# Patient Record
Sex: FEMALE | Race: WHITE | NOT HISPANIC OR LATINO | Employment: OTHER | URBAN - METROPOLITAN AREA
[De-identification: names, ages, dates, MRNs, and addresses within clinical notes are randomized per-mention and may not be internally consistent; named-entity substitution may affect disease eponyms.]

---

## 2017-02-14 ENCOUNTER — HOSPITAL ENCOUNTER (INPATIENT)
Facility: HOSPITAL | Age: 82
LOS: 15 days | Discharge: HOME | DRG: 271 | End: 2017-03-01
Attending: SURGERY | Admitting: SURGERY
Payer: COMMERCIAL

## 2017-02-14 ENCOUNTER — HOSPITAL ENCOUNTER (EMERGENCY)
Facility: HOSPITAL | Age: 82
Discharge: ANOTHER HOSPITAL | End: 2017-02-14
Attending: EMERGENCY MEDICINE
Payer: COMMERCIAL

## 2017-02-14 ENCOUNTER — ANESTHESIA EVENT (OUTPATIENT)
Dept: SURGERY | Facility: HOSPITAL | Age: 82
DRG: 271 | End: 2017-02-14
Payer: COMMERCIAL

## 2017-02-14 ENCOUNTER — ANESTHESIA (OUTPATIENT)
Dept: SURGERY | Facility: HOSPITAL | Age: 82
DRG: 271 | End: 2017-02-14
Payer: COMMERCIAL

## 2017-02-14 ENCOUNTER — SURGERY (OUTPATIENT)
Age: 82
End: 2017-02-14

## 2017-02-14 VITALS
DIASTOLIC BLOOD PRESSURE: 83 MMHG | HEART RATE: 106 BPM | HEIGHT: 67 IN | RESPIRATION RATE: 18 BRPM | WEIGHT: 140 LBS | TEMPERATURE: 98 F | OXYGEN SATURATION: 95 % | SYSTOLIC BLOOD PRESSURE: 120 MMHG | BODY MASS INDEX: 21.97 KG/M2

## 2017-02-14 DIAGNOSIS — I48.0 PAROXYSMAL ATRIAL FIBRILLATION: ICD-10-CM

## 2017-02-14 DIAGNOSIS — I48.0 PAROXYSMAL ATRIAL FIBRILLATION: Primary | ICD-10-CM

## 2017-02-14 DIAGNOSIS — I42.9 CARDIOMYOPATHY: ICD-10-CM

## 2017-02-14 DIAGNOSIS — I70.229 CRITICAL LOWER LIMB ISCHEMIA: Primary | ICD-10-CM

## 2017-02-14 DIAGNOSIS — N39.0 LOWER URINARY TRACT INFECTIOUS DISEASE: ICD-10-CM

## 2017-02-14 DIAGNOSIS — M79.606 LEG PAIN: ICD-10-CM

## 2017-02-14 DIAGNOSIS — I99.8 LOWER LIMB ISCHEMIA: ICD-10-CM

## 2017-02-14 LAB
ABO + RH BLD: NORMAL
ALBUMIN SERPL BCP-MCNC: 2.8 G/DL
ALBUMIN SERPL BCP-MCNC: 3.2 G/DL
ALBUMIN SERPL BCP-MCNC: 3.3 G/DL
ALP SERPL-CCNC: 102 U/L
ALP SERPL-CCNC: 106 U/L
ALP SERPL-CCNC: 93 U/L
ALT SERPL W/O P-5'-P-CCNC: 13 U/L
ALT SERPL W/O P-5'-P-CCNC: 9 U/L
ALT SERPL W/O P-5'-P-CCNC: 9 U/L
ANION GAP SERPL CALC-SCNC: 13 MMOL/L
ANION GAP SERPL CALC-SCNC: 13 MMOL/L
ANION GAP SERPL CALC-SCNC: 5 MMOL/L
APTT BLDCRRT: 108.9 SEC
APTT BLDCRRT: 28.2 SEC
AST SERPL-CCNC: 16 U/L
AST SERPL-CCNC: 17 U/L
AST SERPL-CCNC: 32 U/L
BACTERIA #/AREA URNS HPF: ABNORMAL /HPF
BASOPHILS # BLD AUTO: 0 K/UL
BASOPHILS # BLD AUTO: 0.02 K/UL
BASOPHILS # BLD AUTO: 0.02 K/UL
BASOPHILS NFR BLD: 0.2 %
BASOPHILS NFR BLD: 0.2 %
BASOPHILS NFR BLD: 0.5 %
BILIRUB SERPL-MCNC: 0.5 MG/DL
BILIRUB SERPL-MCNC: 0.7 MG/DL
BILIRUB SERPL-MCNC: 0.9 MG/DL
BILIRUB UR QL STRIP: NEGATIVE
BLD GP AB SCN CELLS X3 SERPL QL: NORMAL
BUN SERPL-MCNC: 21 MG/DL
BUN SERPL-MCNC: 25 MG/DL
BUN SERPL-MCNC: 28 MG/DL
CALCIUM SERPL-MCNC: 8.7 MG/DL
CALCIUM SERPL-MCNC: 9.2 MG/DL
CALCIUM SERPL-MCNC: 9.4 MG/DL
CHLORIDE SERPL-SCNC: 102 MMOL/L
CHLORIDE SERPL-SCNC: 103 MMOL/L
CHLORIDE SERPL-SCNC: 103 MMOL/L
CLARITY UR: ABNORMAL
CO2 SERPL-SCNC: 23 MMOL/L
CO2 SERPL-SCNC: 23 MMOL/L
CO2 SERPL-SCNC: 28 MMOL/L
COLOR UR: YELLOW
CREAT SERPL-MCNC: 1.1 MG/DL
CREAT SERPL-MCNC: 1.2 MG/DL
CREAT SERPL-MCNC: 1.3 MG/DL
DIFFERENTIAL METHOD: ABNORMAL
EOSINOPHIL # BLD AUTO: 0.1 K/UL
EOSINOPHIL NFR BLD: 0.5 %
EOSINOPHIL NFR BLD: 1 %
EOSINOPHIL NFR BLD: 1 %
ERYTHROCYTE [DISTWIDTH] IN BLOOD BY AUTOMATED COUNT: 13.5 %
ERYTHROCYTE [DISTWIDTH] IN BLOOD BY AUTOMATED COUNT: 13.5 %
ERYTHROCYTE [DISTWIDTH] IN BLOOD BY AUTOMATED COUNT: 13.6 %
EST. GFR  (AFRICAN AMERICAN): 44 ML/MIN/1.73 M^2
EST. GFR  (AFRICAN AMERICAN): 48.6 ML/MIN/1.73 M^2
EST. GFR  (AFRICAN AMERICAN): 54 ML/MIN/1.73 M^2
EST. GFR  (NON AFRICAN AMERICAN): 38 ML/MIN/1.73 M^2
EST. GFR  (NON AFRICAN AMERICAN): 42.2 ML/MIN/1.73 M^2
EST. GFR  (NON AFRICAN AMERICAN): 46.9 ML/MIN/1.73 M^2
FACT X PPP CHRO-ACNC: 0.37 IU/ML
FACT X PPP CHRO-ACNC: 1.04 IU/ML
GLUCOSE SERPL-MCNC: 119 MG/DL
GLUCOSE SERPL-MCNC: 122 MG/DL
GLUCOSE SERPL-MCNC: 154 MG/DL
GLUCOSE UR QL STRIP: NEGATIVE
HCT VFR BLD AUTO: 33.4 %
HCT VFR BLD AUTO: 36 %
HCT VFR BLD AUTO: 36.7 %
HGB BLD-MCNC: 11 G/DL
HGB BLD-MCNC: 11.8 G/DL
HGB BLD-MCNC: 12.3 G/DL
HGB UR QL STRIP: NEGATIVE
INR PPP: 1
INR PPP: 1.1
KETONES UR QL STRIP: NEGATIVE
LACTATE SERPL-SCNC: 1 MMOL/L
LEUKOCYTE ESTERASE UR QL STRIP: ABNORMAL
LYMPHOCYTES # BLD AUTO: 1.1 K/UL
LYMPHOCYTES # BLD AUTO: 1.4 K/UL
LYMPHOCYTES # BLD AUTO: 1.5 K/UL
LYMPHOCYTES NFR BLD: 11.6 %
LYMPHOCYTES NFR BLD: 14.9 %
LYMPHOCYTES NFR BLD: 17 %
MAGNESIUM SERPL-MCNC: 1.7 MG/DL
MAGNESIUM SERPL-MCNC: 1.8 MG/DL
MCH RBC QN AUTO: 29 PG
MCH RBC QN AUTO: 29.6 PG
MCH RBC QN AUTO: 29.9 PG
MCHC RBC AUTO-ENTMCNC: 32.8 %
MCHC RBC AUTO-ENTMCNC: 32.9 %
MCHC RBC AUTO-ENTMCNC: 33.5 %
MCV RBC AUTO: 89 FL
MCV RBC AUTO: 89 FL
MCV RBC AUTO: 90 FL
MICROSCOPIC COMMENT: ABNORMAL
MONOCYTES # BLD AUTO: 0.6 K/UL
MONOCYTES # BLD AUTO: 0.7 K/UL
MONOCYTES # BLD AUTO: 0.7 K/UL
MONOCYTES NFR BLD: 6.9 %
MONOCYTES NFR BLD: 7 %
MONOCYTES NFR BLD: 7.6 %
NEUTROPHILS # BLD AUTO: 6.5 K/UL
NEUTROPHILS # BLD AUTO: 7.4 K/UL
NEUTROPHILS # BLD AUTO: 7.6 K/UL
NEUTROPHILS NFR BLD: 74.6 %
NEUTROPHILS NFR BLD: 76.6 %
NEUTROPHILS NFR BLD: 79.9 %
NITRITE UR QL STRIP: POSITIVE
PH UR STRIP: 7 [PH] (ref 5–8)
PHOSPHATE SERPL-MCNC: 3.6 MG/DL
PHOSPHATE SERPL-MCNC: 4.7 MG/DL
PLATELET # BLD AUTO: 206 K/UL
PLATELET # BLD AUTO: 234 K/UL
PLATELET # BLD AUTO: 239 K/UL
PMV BLD AUTO: 7.5 FL
PMV BLD AUTO: 9.4 FL
PMV BLD AUTO: 9.7 FL
POTASSIUM SERPL-SCNC: 3.8 MMOL/L
POTASSIUM SERPL-SCNC: 4.1 MMOL/L
POTASSIUM SERPL-SCNC: 4.4 MMOL/L
PROT SERPL-MCNC: 6.2 G/DL
PROT SERPL-MCNC: 6.9 G/DL
PROT SERPL-MCNC: 7.2 G/DL
PROT UR QL STRIP: NEGATIVE
PROTHROMBIN TIME: 10.6 SEC
PROTHROMBIN TIME: 11.2 SEC
RBC # BLD AUTO: 3.72 M/UL
RBC # BLD AUTO: 4.06 M/UL
RBC # BLD AUTO: 4.11 M/UL
RBC #/AREA URNS HPF: 1 /HPF (ref 0–4)
SODIUM SERPL-SCNC: 136 MMOL/L
SODIUM SERPL-SCNC: 138 MMOL/L
SODIUM SERPL-SCNC: 139 MMOL/L
SP GR UR STRIP: 1.01 (ref 1–1.03)
SQUAMOUS #/AREA URNS HPF: 2 /HPF
URN SPEC COLLECT METH UR: ABNORMAL
UROBILINOGEN UR STRIP-ACNC: NEGATIVE EU/DL
WBC # BLD AUTO: 8.73 K/UL
WBC # BLD AUTO: 9.6 K/UL
WBC # BLD AUTO: 9.65 K/UL
WBC #/AREA URNS HPF: 5 /HPF (ref 0–5)

## 2017-02-14 PROCEDURE — 25000003 PHARM REV CODE 250: Performed by: NURSE ANESTHETIST, CERTIFIED REGISTERED

## 2017-02-14 PROCEDURE — D9220A PRA ANESTHESIA: Mod: CRNA,,, | Performed by: NURSE ANESTHETIST, CERTIFIED REGISTERED

## 2017-02-14 PROCEDURE — 36000706: Performed by: SURGERY

## 2017-02-14 PROCEDURE — 85730 THROMBOPLASTIN TIME PARTIAL: CPT

## 2017-02-14 PROCEDURE — 86901 BLOOD TYPING SEROLOGIC RH(D): CPT

## 2017-02-14 PROCEDURE — 85025 COMPLETE CBC W/AUTO DIFF WBC: CPT

## 2017-02-14 PROCEDURE — 81000 URINALYSIS NONAUTO W/SCOPE: CPT

## 2017-02-14 PROCEDURE — 83605 ASSAY OF LACTIC ACID: CPT

## 2017-02-14 PROCEDURE — 25000003 PHARM REV CODE 250: Performed by: SURGERY

## 2017-02-14 PROCEDURE — 37000009 HC ANESTHESIA EA ADD 15 MINS: Performed by: SURGERY

## 2017-02-14 PROCEDURE — 04CS3ZZ EXTIRPATION OF MATTER FROM LEFT POSTERIOR TIBIAL ARTERY, PERCUTANEOUS APPROACH: ICD-10-PCS | Performed by: SURGERY

## 2017-02-14 PROCEDURE — 63600175 PHARM REV CODE 636 W HCPCS: Performed by: NURSE ANESTHETIST, CERTIFIED REGISTERED

## 2017-02-14 PROCEDURE — 27201423 OPTIME MED/SURG SUP & DEVICES STERILE SUPPLY: Performed by: SURGERY

## 2017-02-14 PROCEDURE — 71000039 HC RECOVERY, EACH ADD'L HOUR: Performed by: SURGERY

## 2017-02-14 PROCEDURE — 99999 HC NO LEVEL OF SERVICE - ED ONLY: CPT

## 2017-02-14 PROCEDURE — 85610 PROTHROMBIN TIME: CPT

## 2017-02-14 PROCEDURE — 86900 BLOOD TYPING SEROLOGIC ABO: CPT | Mod: 91

## 2017-02-14 PROCEDURE — 27602 DECOMPRESSION OF LOWER LEG: CPT | Mod: 51,,, | Performed by: SURGERY

## 2017-02-14 PROCEDURE — 04CN3ZZ EXTIRPATION OF MATTER FROM LEFT POPLITEAL ARTERY, PERCUTANEOUS APPROACH: ICD-10-PCS | Performed by: SURGERY

## 2017-02-14 PROCEDURE — 86850 RBC ANTIBODY SCREEN: CPT | Mod: 91

## 2017-02-14 PROCEDURE — 36415 COLL VENOUS BLD VENIPUNCTURE: CPT

## 2017-02-14 PROCEDURE — 85730 THROMBOPLASTIN TIME PARTIAL: CPT | Mod: 91

## 2017-02-14 PROCEDURE — 96365 THER/PROPH/DIAG IV INF INIT: CPT

## 2017-02-14 PROCEDURE — 27200677 HC TRANSDUCER MONITOR KIT SINGLE: Performed by: NURSE ANESTHETIST, CERTIFIED REGISTERED

## 2017-02-14 PROCEDURE — 12000002 HC ACUTE/MED SURGE SEMI-PRIVATE ROOM

## 2017-02-14 PROCEDURE — 047L3ZZ DILATION OF LEFT FEMORAL ARTERY, PERCUTANEOUS APPROACH: ICD-10-PCS | Performed by: SURGERY

## 2017-02-14 PROCEDURE — 0KNT0ZZ RELEASE LEFT LOWER LEG MUSCLE, OPEN APPROACH: ICD-10-PCS | Performed by: SURGERY

## 2017-02-14 PROCEDURE — 93010 ELECTROCARDIOGRAM REPORT: CPT | Mod: ,,, | Performed by: INTERNAL MEDICINE

## 2017-02-14 PROCEDURE — 85520 HEPARIN ASSAY: CPT

## 2017-02-14 PROCEDURE — C1729 CATH, DRAINAGE: HCPCS | Performed by: SURGERY

## 2017-02-14 PROCEDURE — 93005 ELECTROCARDIOGRAM TRACING: CPT

## 2017-02-14 PROCEDURE — 99285 EMERGENCY DEPT VISIT HI MDM: CPT | Mod: 25

## 2017-02-14 PROCEDURE — 80053 COMPREHEN METABOLIC PANEL: CPT | Mod: 91

## 2017-02-14 PROCEDURE — 80053 COMPREHEN METABOLIC PANEL: CPT

## 2017-02-14 PROCEDURE — 34201 REMOVAL OF ARTERY CLOT: CPT | Mod: ,,, | Performed by: SURGERY

## 2017-02-14 PROCEDURE — 63600175 PHARM REV CODE 636 W HCPCS: Performed by: SURGERY

## 2017-02-14 PROCEDURE — 86920 COMPATIBILITY TEST SPIN: CPT

## 2017-02-14 PROCEDURE — C1894 INTRO/SHEATH, NON-LASER: HCPCS | Performed by: SURGERY

## 2017-02-14 PROCEDURE — 83735 ASSAY OF MAGNESIUM: CPT | Mod: 91

## 2017-02-14 PROCEDURE — 85025 COMPLETE CBC W/AUTO DIFF WBC: CPT | Mod: 91

## 2017-02-14 PROCEDURE — 94640 AIRWAY INHALATION TREATMENT: CPT

## 2017-02-14 PROCEDURE — 27000221 HC OXYGEN, UP TO 24 HOURS

## 2017-02-14 PROCEDURE — 36000707: Performed by: SURGERY

## 2017-02-14 PROCEDURE — 84100 ASSAY OF PHOSPHORUS: CPT

## 2017-02-14 PROCEDURE — 27201037 HC PRESSURE MONITORING SET UP

## 2017-02-14 PROCEDURE — C1751 CATH, INF, PER/CENT/MIDLINE: HCPCS | Performed by: NURSE ANESTHETIST, CERTIFIED REGISTERED

## 2017-02-14 PROCEDURE — 25000003 PHARM REV CODE 250: Performed by: EMERGENCY MEDICINE

## 2017-02-14 PROCEDURE — D9220A PRA ANESTHESIA: Mod: ANES,,, | Performed by: ANESTHESIOLOGY

## 2017-02-14 PROCEDURE — 99223 1ST HOSP IP/OBS HIGH 75: CPT | Mod: 57,,, | Performed by: SURGERY

## 2017-02-14 PROCEDURE — 71000033 HC RECOVERY, INTIAL HOUR: Performed by: SURGERY

## 2017-02-14 PROCEDURE — 27100025 HC TUBING, SET FLUID WARMER: Performed by: NURSE ANESTHETIST, CERTIFIED REGISTERED

## 2017-02-14 PROCEDURE — 85610 PROTHROMBIN TIME: CPT | Mod: 91

## 2017-02-14 PROCEDURE — 86900 BLOOD TYPING SEROLOGIC ABO: CPT

## 2017-02-14 PROCEDURE — C1757 CATH, THROMBECTOMY/EMBOLECT: HCPCS | Performed by: SURGERY

## 2017-02-14 PROCEDURE — 36620 INSERTION CATHETER ARTERY: CPT | Mod: 59,,, | Performed by: ANESTHESIOLOGY

## 2017-02-14 PROCEDURE — 37000008 HC ANESTHESIA 1ST 15 MINUTES: Performed by: SURGERY

## 2017-02-14 PROCEDURE — 04CL3ZZ EXTIRPATION OF MATTER FROM LEFT FEMORAL ARTERY, PERCUTANEOUS APPROACH: ICD-10-PCS | Performed by: SURGERY

## 2017-02-14 PROCEDURE — C1769 GUIDE WIRE: HCPCS | Performed by: SURGERY

## 2017-02-14 PROCEDURE — 87086 URINE CULTURE/COLONY COUNT: CPT

## 2017-02-14 RX ORDER — LIDOCAINE HYDROCHLORIDE 10 MG/ML
1 INJECTION, SOLUTION EPIDURAL; INFILTRATION; INTRACAUDAL; PERINEURAL ONCE
Status: DISCONTINUED | OUTPATIENT
Start: 2017-02-14 | End: 2017-02-14

## 2017-02-14 RX ORDER — FENTANYL CITRATE 50 UG/ML
25 INJECTION, SOLUTION INTRAMUSCULAR; INTRAVENOUS EVERY 5 MIN PRN
Status: DISCONTINUED | OUTPATIENT
Start: 2017-02-14 | End: 2017-02-14

## 2017-02-14 RX ORDER — MORPHINE SULFATE 2 MG/ML
3 INJECTION, SOLUTION INTRAMUSCULAR; INTRAVENOUS EVERY 4 HOURS PRN
Status: DISCONTINUED | OUTPATIENT
Start: 2017-02-14 | End: 2017-02-14

## 2017-02-14 RX ORDER — METOPROLOL TARTRATE 1 MG/ML
5 INJECTION, SOLUTION INTRAVENOUS EVERY 6 HOURS
Status: DISPENSED | OUTPATIENT
Start: 2017-02-15 | End: 2017-02-15

## 2017-02-14 RX ORDER — HYDROMORPHONE HYDROCHLORIDE 1 MG/ML
0.2 INJECTION, SOLUTION INTRAMUSCULAR; INTRAVENOUS; SUBCUTANEOUS EVERY 6 HOURS PRN
Status: DISCONTINUED | OUTPATIENT
Start: 2017-02-14 | End: 2017-03-01 | Stop reason: HOSPADM

## 2017-02-14 RX ORDER — ROCURONIUM BROMIDE 10 MG/ML
INJECTION, SOLUTION INTRAVENOUS
Status: DISCONTINUED | OUTPATIENT
Start: 2017-02-14 | End: 2017-02-14

## 2017-02-14 RX ORDER — ONDANSETRON 2 MG/ML
4 INJECTION INTRAMUSCULAR; INTRAVENOUS DAILY PRN
Status: DISCONTINUED | OUTPATIENT
Start: 2017-02-14 | End: 2017-02-14

## 2017-02-14 RX ORDER — ONDANSETRON 8 MG/1
8 TABLET, ORALLY DISINTEGRATING ORAL EVERY 8 HOURS PRN
Status: DISCONTINUED | OUTPATIENT
Start: 2017-02-14 | End: 2017-03-01 | Stop reason: HOSPADM

## 2017-02-14 RX ORDER — FLUCONAZOLE 2 MG/ML
400 INJECTION, SOLUTION INTRAVENOUS ONCE
Status: COMPLETED | OUTPATIENT
Start: 2017-02-14 | End: 2017-02-15

## 2017-02-14 RX ORDER — SODIUM CHLORIDE 0.9 % (FLUSH) 0.9 %
3 SYRINGE (ML) INJECTION
Status: DISCONTINUED | OUTPATIENT
Start: 2017-02-14 | End: 2017-02-14

## 2017-02-14 RX ORDER — ATORVASTATIN CALCIUM 10 MG/1
10 TABLET, FILM COATED ORAL DAILY
Status: DISCONTINUED | OUTPATIENT
Start: 2017-02-15 | End: 2017-03-01 | Stop reason: HOSPADM

## 2017-02-14 RX ORDER — ONDANSETRON 2 MG/ML
INJECTION INTRAMUSCULAR; INTRAVENOUS
Status: DISCONTINUED | OUTPATIENT
Start: 2017-02-14 | End: 2017-02-14

## 2017-02-14 RX ORDER — PANTOPRAZOLE SODIUM 40 MG/1
40 TABLET, DELAYED RELEASE ORAL DAILY
Status: DISCONTINUED | OUTPATIENT
Start: 2017-02-15 | End: 2017-03-01 | Stop reason: HOSPADM

## 2017-02-14 RX ORDER — MEMANTINE HYDROCHLORIDE 5 MG/1
10 TABLET ORAL 2 TIMES DAILY
Status: DISCONTINUED | OUTPATIENT
Start: 2017-02-15 | End: 2017-03-01 | Stop reason: HOSPADM

## 2017-02-14 RX ORDER — IPRATROPIUM BROMIDE 0.5 MG/2.5ML
0.5 SOLUTION RESPIRATORY (INHALATION) EVERY 8 HOURS
Status: DISCONTINUED | OUTPATIENT
Start: 2017-02-15 | End: 2017-02-25

## 2017-02-14 RX ORDER — ARIPIPRAZOLE 5 MG/1
5 TABLET ORAL DAILY
Status: DISCONTINUED | OUTPATIENT
Start: 2017-02-15 | End: 2017-02-16

## 2017-02-14 RX ORDER — HYDROMORPHONE HYDROCHLORIDE 2 MG/ML
INJECTION, SOLUTION INTRAMUSCULAR; INTRAVENOUS; SUBCUTANEOUS
Status: DISCONTINUED | OUTPATIENT
Start: 2017-02-14 | End: 2017-02-14

## 2017-02-14 RX ORDER — PROPOFOL 10 MG/ML
VIAL (ML) INTRAVENOUS
Status: DISCONTINUED | OUTPATIENT
Start: 2017-02-14 | End: 2017-02-14

## 2017-02-14 RX ORDER — HEPARIN SODIUM 10000 [USP'U]/100ML
INJECTION, SOLUTION INTRAVENOUS CONTINUOUS PRN
Status: DISCONTINUED | OUTPATIENT
Start: 2017-02-14 | End: 2017-02-14

## 2017-02-14 RX ORDER — SODIUM CHLORIDE 9 MG/ML
INJECTION, SOLUTION INTRAVENOUS CONTINUOUS
Status: DISCONTINUED | OUTPATIENT
Start: 2017-02-14 | End: 2017-02-14

## 2017-02-14 RX ORDER — PHENYLEPHRINE HYDROCHLORIDE 10 MG/ML
INJECTION INTRAVENOUS
Status: DISCONTINUED | OUTPATIENT
Start: 2017-02-14 | End: 2017-02-14

## 2017-02-14 RX ORDER — SODIUM CHLORIDE 0.9 % (FLUSH) 0.9 %
3 SYRINGE (ML) INJECTION EVERY 8 HOURS
Status: DISCONTINUED | OUTPATIENT
Start: 2017-02-14 | End: 2017-02-14

## 2017-02-14 RX ORDER — HEPARIN SODIUM,PORCINE/D5W 25000/250
12.5 INTRAVENOUS SOLUTION INTRAVENOUS CONTINUOUS
Status: DISPENSED | OUTPATIENT
Start: 2017-02-14 | End: 2017-02-15

## 2017-02-14 RX ORDER — FENTANYL CITRATE 50 UG/ML
INJECTION, SOLUTION INTRAMUSCULAR; INTRAVENOUS
Status: DISCONTINUED | OUTPATIENT
Start: 2017-02-14 | End: 2017-02-14

## 2017-02-14 RX ORDER — LIDOCAINE HCL/PF 100 MG/5ML
SYRINGE (ML) INTRAVENOUS
Status: DISCONTINUED | OUTPATIENT
Start: 2017-02-14 | End: 2017-02-14

## 2017-02-14 RX ORDER — SODIUM CHLORIDE 9 MG/ML
INJECTION, SOLUTION INTRAVENOUS CONTINUOUS
Status: DISCONTINUED | OUTPATIENT
Start: 2017-02-14 | End: 2017-02-15

## 2017-02-14 RX ORDER — MORPHINE SULFATE 2 MG/ML
2 INJECTION, SOLUTION INTRAMUSCULAR; INTRAVENOUS EVERY 4 HOURS PRN
Status: DISCONTINUED | OUTPATIENT
Start: 2017-02-14 | End: 2017-02-14

## 2017-02-14 RX ORDER — HEPARIN SODIUM 10000 [USP'U]/100ML
INJECTION, SOLUTION INTRAVENOUS
Status: DISCONTINUED | OUTPATIENT
Start: 2017-02-14 | End: 2017-02-14

## 2017-02-14 RX ORDER — SUCCINYLCHOLINE CHLORIDE 20 MG/ML
INJECTION INTRAMUSCULAR; INTRAVENOUS
Status: DISCONTINUED | OUTPATIENT
Start: 2017-02-14 | End: 2017-02-14

## 2017-02-14 RX ORDER — CEFAZOLIN SODIUM 1 G/3ML
INJECTION, POWDER, FOR SOLUTION INTRAMUSCULAR; INTRAVENOUS
Status: DISCONTINUED | OUTPATIENT
Start: 2017-02-14 | End: 2017-02-14

## 2017-02-14 RX ORDER — ESCITALOPRAM OXALATE 10 MG/1
10 TABLET ORAL DAILY
Status: DISCONTINUED | OUTPATIENT
Start: 2017-02-15 | End: 2017-03-01 | Stop reason: HOSPADM

## 2017-02-14 RX ORDER — HYDROCODONE BITARTRATE AND ACETAMINOPHEN 5; 325 MG/1; MG/1
1 TABLET ORAL EVERY 4 HOURS PRN
Status: DISCONTINUED | OUTPATIENT
Start: 2017-02-14 | End: 2017-03-01 | Stop reason: HOSPADM

## 2017-02-14 RX ORDER — GLYCOPYRROLATE 0.2 MG/ML
INJECTION INTRAMUSCULAR; INTRAVENOUS
Status: DISCONTINUED | OUTPATIENT
Start: 2017-02-14 | End: 2017-02-14

## 2017-02-14 RX ORDER — LIDOCAINE HYDROCHLORIDE 10 MG/ML
1 INJECTION, SOLUTION EPIDURAL; INFILTRATION; INTRACAUDAL; PERINEURAL ONCE
Status: CANCELLED | OUTPATIENT
Start: 2017-02-14 | End: 2017-02-14

## 2017-02-14 RX ORDER — NEOSTIGMINE METHYLSULFATE 1 MG/ML
INJECTION, SOLUTION INTRAVENOUS
Status: DISCONTINUED | OUTPATIENT
Start: 2017-02-14 | End: 2017-02-14

## 2017-02-14 RX ORDER — HEPARIN SODIUM 1000 [USP'U]/ML
INJECTION, SOLUTION INTRAVENOUS; SUBCUTANEOUS
Status: DISCONTINUED | OUTPATIENT
Start: 2017-02-14 | End: 2017-02-14

## 2017-02-14 RX ORDER — HEPARIN SODIUM,PORCINE/D5W 25000/250
17 INTRAVENOUS SOLUTION INTRAVENOUS CONTINUOUS
Status: DISCONTINUED | OUTPATIENT
Start: 2017-02-14 | End: 2017-02-14 | Stop reason: HOSPADM

## 2017-02-14 RX ORDER — MIRTAZAPINE 15 MG/1
30 TABLET, FILM COATED ORAL NIGHTLY
Status: DISCONTINUED | OUTPATIENT
Start: 2017-02-14 | End: 2017-03-01 | Stop reason: HOSPADM

## 2017-02-14 RX ORDER — METOPROLOL TARTRATE 100 MG/1
100 TABLET ORAL 2 TIMES DAILY
Status: DISCONTINUED | OUTPATIENT
Start: 2017-02-15 | End: 2017-02-15

## 2017-02-14 RX ADMIN — Medication 50 MG: at 10:02

## 2017-02-14 RX ADMIN — HYDROMORPHONE HYDROCHLORIDE 0.6 MG: 2 INJECTION INTRAMUSCULAR; INTRAVENOUS; SUBCUTANEOUS at 10:02

## 2017-02-14 RX ADMIN — PHENYLEPHRINE HYDROCHLORIDE 100 MCG: 10 INJECTION INTRAVENOUS at 08:02

## 2017-02-14 RX ADMIN — PHENYLEPHRINE HYDROCHLORIDE 200 MCG: 10 INJECTION INTRAVENOUS at 09:02

## 2017-02-14 RX ADMIN — SODIUM CHLORIDE, SODIUM GLUCONATE, SODIUM ACETATE, POTASSIUM CHLORIDE, MAGNESIUM CHLORIDE, SODIUM PHOSPHATE, DIBASIC, AND POTASSIUM PHOSPHATE: .53; .5; .37; .037; .03; .012; .00082 INJECTION, SOLUTION INTRAVENOUS at 08:02

## 2017-02-14 RX ADMIN — HEPARIN SODIUM AND DEXTROSE 700 UNITS: 10000; 5 INJECTION INTRAVENOUS at 09:02

## 2017-02-14 RX ADMIN — SODIUM CHLORIDE, SODIUM GLUCONATE, SODIUM ACETATE, POTASSIUM CHLORIDE, MAGNESIUM CHLORIDE, SODIUM PHOSPHATE, DIBASIC, AND POTASSIUM PHOSPHATE: .53; .5; .37; .037; .03; .012; .00082 INJECTION, SOLUTION INTRAVENOUS at 07:02

## 2017-02-14 RX ADMIN — FENTANYL CITRATE 50 MCG: 50 INJECTION, SOLUTION INTRAMUSCULAR; INTRAVENOUS at 07:02

## 2017-02-14 RX ADMIN — NEOSTIGMINE METHYLSULFATE 3 MG: 1 INJECTION INTRAVENOUS at 10:02

## 2017-02-14 RX ADMIN — SUCCINYLCHOLINE CHLORIDE 100 MG: 20 INJECTION, SOLUTION INTRAMUSCULAR; INTRAVENOUS at 07:02

## 2017-02-14 RX ADMIN — PROPOFOL 50 MG: 10 INJECTION, EMULSION INTRAVENOUS at 08:02

## 2017-02-14 RX ADMIN — SODIUM CHLORIDE: 0.9 INJECTION, SOLUTION INTRAVENOUS at 10:02

## 2017-02-14 RX ADMIN — ROCURONIUM BROMIDE 25 MG: 10 INJECTION, SOLUTION INTRAVENOUS at 07:02

## 2017-02-14 RX ADMIN — HEPARIN SODIUM 6000 UNITS: 1000 INJECTION, SOLUTION INTRAVENOUS; SUBCUTANEOUS at 07:02

## 2017-02-14 RX ADMIN — PROPOFOL 30 MG: 10 INJECTION, EMULSION INTRAVENOUS at 09:02

## 2017-02-14 RX ADMIN — HYDROMORPHONE HYDROCHLORIDE 0.2 MG: 2 INJECTION INTRAMUSCULAR; INTRAVENOUS; SUBCUTANEOUS at 10:02

## 2017-02-14 RX ADMIN — PHENYLEPHRINE HYDROCHLORIDE 200 MCG: 10 INJECTION INTRAVENOUS at 07:02

## 2017-02-14 RX ADMIN — PROPOFOL 30 MG: 10 INJECTION, EMULSION INTRAVENOUS at 08:02

## 2017-02-14 RX ADMIN — FENTANYL CITRATE 25 MCG: 50 INJECTION, SOLUTION INTRAMUSCULAR; INTRAVENOUS at 08:02

## 2017-02-14 RX ADMIN — LIDOCAINE HYDROCHLORIDE 80 SYRINGE: 20 INJECTION, SOLUTION INTRAVENOUS at 07:02

## 2017-02-14 RX ADMIN — HEPARIN SODIUM AND DEXTROSE 17 UNITS/KG/HR: 10000; 5 INJECTION INTRAVENOUS at 07:02

## 2017-02-14 RX ADMIN — HEPARIN SODIUM 10000 UNITS: 1000 INJECTION, SOLUTION INTRAVENOUS; SUBCUTANEOUS at 08:02

## 2017-02-14 RX ADMIN — ROCURONIUM BROMIDE 5 MG: 10 INJECTION, SOLUTION INTRAVENOUS at 07:02

## 2017-02-14 RX ADMIN — FLUCONAZOLE 400 MG: 2 INJECTION INTRAVENOUS at 10:02

## 2017-02-14 RX ADMIN — PHENYLEPHRINE HYDROCHLORIDE 100 MCG: 10 INJECTION INTRAVENOUS at 07:02

## 2017-02-14 RX ADMIN — EPHEDRINE SULFATE 5 MG: 50 INJECTION, SOLUTION INTRAMUSCULAR; INTRAVENOUS; SUBCUTANEOUS at 08:02

## 2017-02-14 RX ADMIN — CALCIUM CHLORIDE 500 MG: 100 INJECTION, SOLUTION INTRAVENOUS at 08:02

## 2017-02-14 RX ADMIN — HEPARIN SODIUM 2000 UNITS: 1000 INJECTION, SOLUTION INTRAVENOUS; SUBCUTANEOUS at 08:02

## 2017-02-14 RX ADMIN — VANCOMYCIN HYDROCHLORIDE 1 G: 1 INJECTION, POWDER, LYOPHILIZED, FOR SOLUTION INTRAVENOUS at 07:02

## 2017-02-14 RX ADMIN — PROPOFOL 120 MG: 10 INJECTION, EMULSION INTRAVENOUS at 07:02

## 2017-02-14 RX ADMIN — CEFAZOLIN 2 G: 1 INJECTION, POWDER, FOR SOLUTION INTRAVENOUS at 07:02

## 2017-02-14 RX ADMIN — GLYCOPYRROLATE 0.4 MG: 0.2 INJECTION, SOLUTION INTRAMUSCULAR; INTRAVENOUS at 10:02

## 2017-02-14 RX ADMIN — HEPARIN SODIUM AND DEXTROSE 12.5 UNITS/KG/HR: 10000; 5 INJECTION INTRAVENOUS at 10:02

## 2017-02-14 RX ADMIN — FENTANYL CITRATE 25 MCG: 50 INJECTION, SOLUTION INTRAMUSCULAR; INTRAVENOUS at 09:02

## 2017-02-14 RX ADMIN — ROCURONIUM BROMIDE 20 MG: 10 INJECTION, SOLUTION INTRAVENOUS at 08:02

## 2017-02-14 RX ADMIN — METOPROLOL TARTRATE 5 MG: 5 INJECTION INTRAVENOUS at 11:02

## 2017-02-14 RX ADMIN — IPRATROPIUM BROMIDE 0.5 MG: 0.5 SOLUTION RESPIRATORY (INHALATION) at 11:02

## 2017-02-14 NOTE — ED NOTES
US is still at bedside. Patient has been updated on plan of care and lab results. No needs or questions at this time.

## 2017-02-14 NOTE — ED NOTES
Upon transfer to [facility] [place], patient is AAO to self, place and situation. Patient is unsure what year it is. Report has been given to paramedic. No cardiac or respiratory complications.

## 2017-02-14 NOTE — ED NOTES
Presents to the ER with c/o bilateral lower extremity pain that is a chronic problem. Caretakers reports that patient started to cry out that her legs hurt between her ankles and knee with increased pain and numbness with tingling to left leg. Skin is significantly cooler in left leg than right. + pulses to right side, of body, + left pulse to left femoral and left popliteal, the left pedal pulse is absent. Attempted doppler to left foot without success. Decreased sensation to left lower extremity. Associated complaints are and episode of SOB that occurred with pain. EMS reports that patient was 85% on room air when they arrived to patient. Patient is 95% room air, upon arrival to ED. Patient is a poor historian secondary to dementia. Mucous membranes are pink and moist. Skin is warm, dry and intact. Lungs decreased breath sounds  bilaterally, respirations are regular and unlabored. Denies cough, congestion, or rhinorrhea. BS active x4, no tenderness with palpation, abd is soft and not distended. Denies any appetite or activity change. S1S2, capillary refill is < 2 seconds. Denies dysuria, difficulty urinating, frequency, numbness, tingling or weakness. NBA SHELLEY

## 2017-02-14 NOTE — ED PROVIDER NOTES
"Encounter Date: 2/14/2017    SCRIBE #1 NOTE: I, Yazmin Nunes, am scribing for, and in the presence of,  Dr. Turner. I have scribed the entire note.       History     Chief Complaint   Patient presents with    Leg Pain     bilatearl leg pain      Review of patient's allergies indicates:  No Known Allergies  HPI Comments: 02/14/2017  1:07 PM     Chief Complaint: pain in her legs       The patient is an 82 y.o. female who is coming in from [facility] assisted living is presenting with leg pain. Pt reports she has had leg pain for "quite a while". It is located between her ankles and knees bilaterally. After speaking to Elizabeth at [facility], her nurse states she was screaming in pain complaining that her left leg was hurting and going numb. Pain does not radiate to her calves.   She denies fevers chills, diarrhea or vomiting. There are no other complaints at this time. She has a past medical history of Anxiety; Arthritis; Dementia; Former smoker; Hyperlipidemia; Hypertension; and Stroke.  has a past surgical history that includes Hysterectomy; Anterior vaginal repair (1960's); Colonoscopy (2008); Upper gastrointestinal endoscopy (2008); Joint replacement; and Colonoscopy (N/A, 1/5/2016).      The history is provided by the patient, the EMS personnel and the nursing home.     Past Medical History   Diagnosis Date    Anxiety     Arthritis     Dementia     Former smoker     Hyperlipidemia     Hypertension     Stroke      at age 58     Past Medical History Pertinent Negatives   Diagnosis Date Noted    Cancer 12/30/2015    Colon polyp 12/8/2015    Encounter for blood transfusion 12/30/2015    GERD (gastroesophageal reflux disease) 12/8/2015     Past Surgical History   Procedure Laterality Date    Hysterectomy       total    Anterior vaginal repair  1960's    Colonoscopy  2008    Upper gastrointestinal endoscopy  2008      h. pylori    Joint replacement       bilateral hip    Colonoscopy N/A 1/5/2016     " Procedure: COLONOSCOPY;  Surgeon: Daniel Alicea MD;  Location: [facility];  Service: Endoscopy;  Laterality: N/A;     Family History   Problem Relation Age of Onset    Colon cancer Mother      in late 60s    Colon cancer Father      Social History   Substance Use Topics    Smoking status: Former Smoker     Packs/day: 1.00     Years: 25.00     Types: Cigarettes     Quit date: 10/7/1998    Smokeless tobacco: Not on file    Alcohol use No      Comment: occ     Review of Systems   Constitutional: Negative for fever.   Respiratory: Positive for shortness of breath (earlier today, gone now).    Cardiovascular: Negative for chest pain.   Gastrointestinal: Negative for nausea.   Musculoskeletal: Positive for myalgias (biltaeral leg pain). Negative for back pain.   Skin: Negative for rash.   Hematological: Does not bruise/bleed easily.   All other systems reviewed and are negative.      Physical Exam   Initial Vitals   BP Pulse Resp Temp SpO2   02/14/17 1304 02/14/17 1304 02/14/17 1304 02/14/17 1304 02/14/17 1304   123/69 113 18 97.5 °F (36.4 °C) 95 %     Physical Exam    Nursing note and vitals reviewed.  Constitutional: She appears well-developed and well-nourished. She is not diaphoretic. No distress.   HENT:   Head: Normocephalic and atraumatic.   Eyes: Conjunctivae are normal.   Cardiovascular: Regular rhythm and normal heart sounds. Exam reveals no gallop and no friction rub.    No murmur heard.  No DP pulse on left. 2+ DP on the right  Tachycardic    Bilateral Doppler signal in the popliteal arteries.  Bilateral palpable femoral pulses.    Left lower extremity cool to touch.  No lower extremity emboli   Pulmonary/Chest: No respiratory distress. She has no wheezes. She has no rhonchi. She has no rales.   Decreased breath sounds bilaterally.    Abdominal: Soft. Bowel sounds are normal. She exhibits no distension and no mass. There is no tenderness. There is no rebound and no guarding.   Musculoskeletal:    bilateral chronic venous stasis    Neurological: She is alert.   Oriented to place, not year    Skin: Skin is warm.   Psychiatric: She has a normal mood and affect.         ED Course   Critical Care  Date/Time: 2/14/2017 5:26 PM  Performed by: BROOKS KENT  Authorized by: BROOKS KENT   Direct patient critical care time: 25 minutes  Additional history critical care time: 10 minutes  Ordering / reviewing critical care time: 15 minutes  Documentation critical care time: 15 minutes  Consulting other physicians critical care time: 10 minutes  Consult with family critical care time: 13 minutes  Total critical care time (exclusive of procedural time) : 88 minutes  Comments: Critical care for left lower extremity ischemia requiring a heparin drip and transferred to a facility with vascular surgery        Labs Reviewed - No data to display  EKG Readings: (Independently Interpreted)   Initial Reading: No STEMI. Rhythm: Normal Sinus Rhythm. Heart Rate: 99. Ectopy: No Ectopy PVCs. Conduction: Normal. ST Segments: Normal ST Segments. T Waves: Normal. Clinical Impression: Normal Sinus Rhythm          Medical Decision Making:   History:   I obtained history from: someone other than patient.  Clinical Tests:   Lab Tests: Ordered and Reviewed  Radiological Study: Ordered and Reviewed  Medical Tests: Ordered and Reviewed            Scribe Attestation:   Scribe #1: I performed the above scribed service and the documentation accurately describes the services I performed. I attest to the accuracy of the note.    Attending Attestation:           Physician Attestation for Scribe:  Physician Attestation Statement for Scribe #1: I, Dr. Kent, reviewed documentation, as scribed by Isamar Nunes in my presence, and it is both accurate and complete.                 ED Course   Comment By Time   Case discussed with Elizabeth at [facility] states the patient has been complaining of leg pain today and was screaming in pain  earlier in complaining of numbness to the left leg.  Also complained of some shortness of breath on EMS arrival. Dominguez Turner MD 02/14 1313   Bilateral palpable femoral pulses.  Bilateral popliteal Doppler signal. Dominguez Turner MD 02/14 1319   Case discussed Dr. Ferrera of radiology, arterial occlusion of the left lower extremity.  [facility] called to consult vascular surgery. Dominguez Turner MD 02/14 1540   Awaiting a call for [facility] vascular surgery regarding the patient's condition.  A heparin drip has been ordered at this time. Dominguez Turner MD 02/14 1551   Case d/w dr zaman vascular surgery, who agrees that the heparin drip.  Patient will be transferred to [facility] for vascular surgery consultation Dominguez Turner MD 02/14 1607   EMS here to transport patient Dominguez Turner MD 02/14 1635     Clinical Impression:   The encounter diagnosis was Leg pain.      82-year-old female with dementia presents to the ER for leg pain.  Left lower extremity no palpable dorsalis pedal pulse on arrival.  Bounding right dorsalis pedal pulse.  Left lower extremity ultrasound demonstrates occluded femoral artery and occluded popliteal.  Case discussed with vascular surgery.  Heparin drip was started prior to consultation with vascular surgery.  Transferred emergently to [facility] for left lower extremity ischemia which is likely acute.     Dominguez Turner MD  02/14/17 7028

## 2017-02-14 NOTE — ED NOTES
is at the bedside, he has been updated on plan of care and lab results. No needs or questions at this time.

## 2017-02-14 NOTE — IP AVS SNAPSHOT
[facility]  [address]  [facility] [address]  Phone: [phone]           Patient Discharge Instructions     Our goal is to set you up for success. This packet includes information on your condition, medications, and your home care. It will help you to care for yourself so you don't get sicker and need to go back to the hospital.     Please ask your nurse if you have any questions.        There are many details to remember when preparing to leave the hospital. Here is what you will need to do:    1. Take your medicine. If you are prescribed medications, review your Medication List in the following pages. You may have new medications to  at the pharmacy and others that you'll need to stop taking. Review the instructions for how and when to take your medications. Talk with your doctor or nurses if you are unsure of what to do.     2. Go to your follow-up appointments. Specific follow-up information is listed in the following pages. Your may be contacted by a transition nurse or clinical provider about future appointments. Be sure we have all of the phone numbers to reach you, if needed. Please contact your provider's office if you are unable to make an appointment.     3. Watch for warning signs. Your doctor or nurse will give you detailed warning signs to watch for and when to call for assistance. These instructions may also include educational information about your condition. If you experience any of warning signs to your health, call your doctor.               [facility]  Unless otherwise directed by your provider, please contact [facility], our nurse care line that is available for 24/7 assistance.     [phone] (toll-free)    Registered nurses in the [facility] provide clinical advisement, health education, appointment booking, and other advisory services.                    ** Verify the list of medication(s) below is accurate and up  to date. Carry this with you in case of emergency. If your medications have changed, please notify your healthcare provider.             Medication List      START taking these medications        Additional Info                      apixaban 2.5 mg Tab   Quantity:  60 tablet   Refills:  11   Dose:  2.5 mg    Last time this was given:  2.5 mg on 3/1/2017  9:22 AM   Instructions:  Take 1 tablet (2.5 mg total) by mouth 2 (two) times daily.     Begin Date    AM    Noon    PM    Bedtime       aspirin 81 MG EC tablet   Commonly known as:  ECOTRIN   Refills:  0   Dose:  81 mg    Last time this was given:  81 mg on 3/1/2017  9:22 AM   Instructions:  Take 1 tablet (81 mg total) by mouth once daily.     Begin Date    AM    Noon    PM    Bedtime       hydrocodone-acetaminophen 5-325mg 5-325 mg per tablet   Commonly known as:  NORCO   Quantity:  41 tablet   Refills:  0   Dose:  1 tablet    Last time this was given:  1 tablet on 2/27/2017  8:54 AM   Instructions:  Take 1 tablet by mouth every 4 (four) hours as needed.     Begin Date    AM    Noon    PM    Bedtime       lisinopril 2.5 MG tablet   Commonly known as:  PRINIVIL,ZESTRIL   Quantity:  90 tablet   Refills:  3   Dose:  2.5 mg    Last time this was given:  2.5 mg on 3/1/2017  9:22 AM   Instructions:  Take 1 tablet (2.5 mg total) by mouth once daily.     Begin Date    AM    Noon    PM    Bedtime       senna-docusate 8.6-50 mg 8.6-50 mg per tablet   Commonly known as:  PERICOLACE   Refills:  0   Dose:  1 tablet    Last time this was given:  1 tablet on 3/1/2017  9:22 AM   Instructions:  Take 1 tablet by mouth 2 (two) times daily.     Begin Date    AM    Noon    PM    Bedtime         CONTINUE taking these medications        Additional Info                      ABILIFY 10 MG Tab   Refills:  0   Dose:  5 mg   Generic drug:  aripiprazole    Last time this was given:  5 mg on 2/22/2017  9:24 AM   Instructions:  Take 5 mg by mouth once daily.     Begin Date    AM    Noon    PM     Bedtime       atorvastatin 10 MG tablet   Commonly known as:  LIPITOR   Refills:  0   Dose:  10 mg    Last time this was given:  10 mg on 3/1/2017  9:23 AM   Instructions:  Take 10 mg by mouth once daily.     Begin Date    AM    Noon    PM    Bedtime       donepezil 10 MG tablet   Commonly known as:  ARICEPT   Quantity:  30 tablet   Refills:  11   Dose:  10 mg    Instructions:  Take 1 tablet (10 mg total) by mouth once daily.     Begin Date    AM    Noon    PM    Bedtime       escitalopram oxalate 10 MG tablet   Commonly known as:  LEXAPRO   Refills:  0   Dose:  10 mg    Last time this was given:  10 mg on 3/1/2017  9:22 AM   Instructions:  Take 10 mg by mouth once daily.     Begin Date    AM    Noon    PM    Bedtime       FIORICET -40 mg per tablet   Refills:  0   Dose:  1 tablet   Generic drug:  butalbital-acetaminophen-caffeine -40 mg    Instructions:  Take 1 tablet by mouth every 4 (four) hours as needed for Pain.     Begin Date    AM    Noon    PM    Bedtime       ginkgo biloba 60 mg Cap   Refills:  0   Dose:  1 tablet    Instructions:  Take 1 tablet by mouth once daily.     Begin Date    AM    Noon    PM    Bedtime       metoprolol succinate 100 MG 24 hr tablet   Commonly known as:  TOPROL-XL   Refills:  0   Dose:  100 mg    Instructions:  Take 100 mg by mouth once daily.     Begin Date    AM    Noon    PM    Bedtime       mirabegron 25 mg Tb24 ER tablet   Commonly known as:  MYRBETRIQ   Quantity:  30 tablet   Refills:  11   Dose:  25 mg    Instructions:  Take 1 tablet (25 mg total) by mouth once daily.     Begin Date    AM    Noon    PM    Bedtime       NAMENDA 10 MG Tab   Refills:  0   Dose:  10 mg   Generic drug:  memantine    Last time this was given:  10 mg on 3/1/2017  9:22 AM   Instructions:  10 mg 2 (two) times daily.     Begin Date    AM    Noon    PM    Bedtime       pantoprazole 40 MG tablet   Commonly known as:  PROTONIX   Quantity:  30 tablet   Refills:  6   Dose:  40 mg    Last time  this was given:  40 mg on 3/1/2017  9:22 AM   Instructions:  Take 1 tablet (40 mg total) by mouth once daily.     Begin Date    AM    Noon    PM    Bedtime       REMERON 30 MG tablet   Refills:  0   Dose:  30 mg   Generic drug:  mirtazapine    Last time this was given:  30 mg on 2/28/2017  9:04 PM   Instructions:  Take 30 mg by mouth every evening.     Begin Date    AM    Noon    PM    Bedtime       triamterene-hydrochlorothiazide 37.5-25 mg 37.5-25 mg per capsule   Commonly known as:  DYAZIDE   Refills:  0   Dose:  1 capsule    Instructions:  Take 1 capsule by mouth every morning.     Begin Date    AM    Noon    PM    Bedtime            Where to Get Your Medications      You can get these medications from any pharmacy     Bring a paper prescription for each of these medications     apixaban 2.5 mg Tab    donepezil 10 MG tablet    hydrocodone-acetaminophen 5-325mg 5-325 mg per tablet    lisinopril 2.5 MG tablet       You don't need a prescription for these medications     aspirin 81 MG EC tablet    senna-docusate 8.6-50 mg 8.6-50 mg per tablet                  Please bring to all follow up appointments:    1. A copy of your discharge instructions.  2. All medicines you are currently taking in their original bottles.  3. Identification and insurance card.    Please arrive 15 minutes ahead of scheduled appointment time.    Please call 24 hours in advance if you must reschedule your appointment and/or time.        Follow-up Information     Follow up with Rigo Bernard MD In 1 month.    Specialty:  Vascular Surgery    Contact information:    [address]  [facility] [id number] [phone]          Follow up with [facility].    Specialty:  Home Health Services    Contact information:    [address] [facility] [address] [phone]          Discharge Instructions     Future Orders    Activity as tolerated     Call MD for:  difficulty breathing or increased cough     Call MD for:  increased  "confusion or weakness     Call MD for:  persistent dizziness, light-headedness, or visual disturbances     Call MD for:  persistent nausea and vomiting or diarrhea     Call MD for:  redness, tenderness, or signs of infection (pain, swelling, redness, odor or green/yellow discharge around incision site)     Call MD for:  severe uncontrolled pain     Call MD for:  temperature >100.4     Call MD for:  worsening rash     COMMODE FOR HOME USE     Questions:    Type:  Standard    Height:  5' 6" (1.676 m)    Weight:  63.5 kg (140 lb)    Does patient have medical equipment at home?:  none    Length of need (1-99 months):  99    Vendor:  [facility] Comment - [facility] to deliver, orders sent to [facility]     Expected Date of Delivery:  3/1/2017    Expected Time of Delivery:      Diet general     Questions:    Total calories:      Fat restriction, if any:      Protein restriction, if any:      Na restriction, if any:      Fluid restriction:      Additional restrictions:      VAS US Doppler Arterial Leg Left     WHEELCHAIR FOR HOME USE     Questions:    Hours in W/C per day:  18    Type of Wheelchair:  Standard    Size(Width):  18"(STD adult)    Leg Support:  Elevating leg rests    Arm Height:      Desired seat depth:      Back height:      Lower leg length:      Actual seat depth:      Lap Belt:  Velcro    Accessories:  Safety belt    Cushion:  Basic    Justification for cushion:      Height:  5' 6" (1.676 m)    Weight:  63.5 kg (140 lb)    Does patient have medical equipment at home?:  none    Length of need (1-99 months):  99    Please check all that apply:  The patient requires the use of a w/c for activities of daily living within the Home.    Patient mobility limitations cannot be sufficiently resolved by the use of other ambulatory therapies.    Vendor:  [facility] Comment - [facility] to deliver, orders sent to [facility]    Expected Date of Delivery:  3/1/2017    Expected Time of Delivery:          Primary Diagnosis     Your primary " diagnosis was:  Lack Of Blood Supply To Leg      Admission Information     Date & Time Provider Department CSN    2/14/2017  5:59 PM Rigo Bernard MD [facility] [id number]      Care Providers     Provider Role Specialty Primary office phone    Rigo Bernard MD Attending Provider Vascular Surgery [phone]    Rigo Bernard MD Surgeon  Vascular Surgery [phone]      Your Vitals Were     BP                   90/51 (BP Location: Left arm, Patient Position: Lying, BP Method: Automatic)           Recent Lab Values     No lab values to display.      Pending Labs     Order Current Status    APTT In process    Anti-Xa Heparin Monitoring In process    CBC auto differential In process    CBC auto differential In process    Comprehensive metabolic panel In process    Comprehensive metabolic panel In process    Magnesium In process    Magnesium In process    Phosphorus In process    Phosphorus In process    Prepare RBC In process    Prepare RBC 2 Units; surgery In process    Protime-INR In process    Type & Screen In process    Urine culture In process      Allergies as of 3/1/2017     No Known Allergies      Advance Directives     An advance directive is a document which, in the event you are no longer able to make decisions for yourself, tells your healthcare team what kind of treatment you do or do not want to receive, or who you would like to make those decisions for you.  If you do not currently have an advance directive, [facility] encourages you to create one.  For more information call:  [phone]), [phone] ([phone]),  or log on to [email or web address].        Smoking Cessation     If you would like to quit smoking:   You may be eligible for free services if you are a [place] resident and started smoking cigarettes before September 1, 1988.  Call the Smoking Cessation Trust (SCT) toll free at (463) 364-4865 or (183) 288-7155.   Call 5-800QUIT-NOW if you  do not meet the above criteria.            Language Assistance Services     ATTENTION: Language assistance services are available, free of charge. Please call [phone].      ATENCIÓN: Si collin pablo, tiene a pelletier disposición servicios gratuitos de asistencia lingüística. George al [phone].     CHÚ Ý: N?u b?n nói Ti?ng Vi?t, có các d?ch v? h? tr? ngôn ng? mi?n phí dành cho b?n. G?i s? [phone].        Woodrow Parsons           [facility] Sign-Up     Activating your [facility] account is as easy as 1-2-3!     1) Visit [email or web address], select Sign Up Now, enter this activation code and your date of birth, then select Next.  [email or web address]  Expires: 4/15/2017  5:30 PM      2) Create a username and password to use when you visit [facility] in the future and select a security question in case you lose your password and select Next.    3) Enter your e-mail address and click Sign Up!    Additional Information  If you have questions, please e-mail [email or web address] or call [phone] to talk to our [facility] staff. Remember, [facility] is NOT to be used for urgent needs. For medical emergencies, dial 911.          [facility] complies with applicable Federal civil rights laws and does not discriminate on the basis of race, color, national origin, age, disability, or sex.

## 2017-02-15 LAB
ALBUMIN SERPL BCP-MCNC: 2.6 G/DL
ALP SERPL-CCNC: 88 U/L
ALT SERPL W/O P-5'-P-CCNC: 21 U/L
ANION GAP SERPL CALC-SCNC: 9 MMOL/L
APTT BLDCRRT: 25.9 SEC
AST SERPL-CCNC: 81 U/L
BASOPHILS # BLD AUTO: 0.01 K/UL
BASOPHILS NFR BLD: 0.1 %
BILIRUB SERPL-MCNC: 0.7 MG/DL
BUN SERPL-MCNC: 20 MG/DL
CALCIUM SERPL-MCNC: 8.3 MG/DL
CHLORIDE SERPL-SCNC: 105 MMOL/L
CO2 SERPL-SCNC: 23 MMOL/L
CREAT SERPL-MCNC: 1 MG/DL
DIFFERENTIAL METHOD: ABNORMAL
EOSINOPHIL # BLD AUTO: 0.1 K/UL
EOSINOPHIL NFR BLD: 1 %
ERYTHROCYTE [DISTWIDTH] IN BLOOD BY AUTOMATED COUNT: 13.6 %
EST. GFR  (AFRICAN AMERICAN): >60 ML/MIN/1.73 M^2
EST. GFR  (NON AFRICAN AMERICAN): 52.6 ML/MIN/1.73 M^2
FACT X PPP CHRO-ACNC: 0.14 IU/ML
FACT X PPP CHRO-ACNC: 0.44 IU/ML
FACT X PPP CHRO-ACNC: <0.1 IU/ML
FACT X PPP CHRO-ACNC: >1.87 IU/ML
GLUCOSE SERPL-MCNC: 122 MG/DL
GLUCOSE SERPL-MCNC: 146 MG/DL (ref 70–110)
HCO3 UR-SCNC: 24.6 MMOL/L (ref 24–28)
HCT VFR BLD AUTO: 31.9 %
HCT VFR BLD CALC: 31 %PCV (ref 36–54)
HGB BLD-MCNC: 10.5 G/DL
INR PPP: 1
LYMPHOCYTES # BLD AUTO: 1 K/UL
LYMPHOCYTES NFR BLD: 11 %
MAGNESIUM SERPL-MCNC: 1.7 MG/DL
MCH RBC QN AUTO: 29.8 PG
MCHC RBC AUTO-ENTMCNC: 32.9 %
MCV RBC AUTO: 91 FL
MITRAL VALVE MOBILITY: ABNORMAL
MITRAL VALVE REGURGITATION: ABNORMAL
MONOCYTES # BLD AUTO: 0.7 K/UL
MONOCYTES NFR BLD: 7.7 %
NEUTROPHILS # BLD AUTO: 7.5 K/UL
NEUTROPHILS NFR BLD: 79.8 %
PCO2 BLDA: 42.2 MMHG (ref 35–45)
PH SMN: 7.37 [PH] (ref 7.35–7.45)
PHOSPHATE SERPL-MCNC: 4.2 MG/DL
PHOSPHATE SERPL-MCNC: 4.2 MG/DL
PLATELET # BLD AUTO: 214 K/UL
PMV BLD AUTO: 9.7 FL
PO2 BLDA: 64 MMHG (ref 80–100)
POC ACTIVATED CLOTTING TIME K: 157 SEC (ref 74–137)
POC ACTIVATED CLOTTING TIME K: 229 SEC (ref 74–137)
POC ACTIVATED CLOTTING TIME K: 229 SEC (ref 74–137)
POC BE: -1 MMOL/L
POC IONIZED CALCIUM: 1.06 MMOL/L (ref 1.06–1.42)
POC SATURATED O2: 92 % (ref 95–100)
POC TCO2: 26 MMOL/L (ref 23–27)
POTASSIUM BLD-SCNC: 3.8 MMOL/L (ref 3.5–5.1)
POTASSIUM SERPL-SCNC: 4.6 MMOL/L
PROT SERPL-MCNC: 5.9 G/DL
PROTHROMBIN TIME: 10.6 SEC
RBC # BLD AUTO: 3.52 M/UL
RETIRED EF AND QEF - SEE NOTES: 20 (ref 55–65)
SAMPLE: ABNORMAL
SODIUM BLD-SCNC: 138 MMOL/L (ref 136–145)
SODIUM SERPL-SCNC: 137 MMOL/L
WBC # BLD AUTO: 9.34 K/UL

## 2017-02-15 PROCEDURE — 36415 COLL VENOUS BLD VENIPUNCTURE: CPT

## 2017-02-15 PROCEDURE — 63600175 PHARM REV CODE 636 W HCPCS: Performed by: SURGERY

## 2017-02-15 PROCEDURE — 93307 TTE W/O DOPPLER COMPLETE: CPT

## 2017-02-15 PROCEDURE — 84100 ASSAY OF PHOSPHORUS: CPT

## 2017-02-15 PROCEDURE — 94640 AIRWAY INHALATION TREATMENT: CPT

## 2017-02-15 PROCEDURE — 85520 HEPARIN ASSAY: CPT | Mod: 91

## 2017-02-15 PROCEDURE — 99233 SBSQ HOSP IP/OBS HIGH 50: CPT | Mod: ,,, | Performed by: INTERNAL MEDICINE

## 2017-02-15 PROCEDURE — 85730 THROMBOPLASTIN TIME PARTIAL: CPT

## 2017-02-15 PROCEDURE — 25000003 PHARM REV CODE 250: Performed by: SURGERY

## 2017-02-15 PROCEDURE — 94799 UNLISTED PULMONARY SVC/PX: CPT

## 2017-02-15 PROCEDURE — 11000001 HC ACUTE MED/SURG PRIVATE ROOM

## 2017-02-15 PROCEDURE — 85025 COMPLETE CBC W/AUTO DIFF WBC: CPT

## 2017-02-15 PROCEDURE — 27000221 HC OXYGEN, UP TO 24 HOURS

## 2017-02-15 PROCEDURE — 80053 COMPREHEN METABOLIC PANEL: CPT

## 2017-02-15 PROCEDURE — 85520 HEPARIN ASSAY: CPT

## 2017-02-15 PROCEDURE — 85610 PROTHROMBIN TIME: CPT

## 2017-02-15 PROCEDURE — 83735 ASSAY OF MAGNESIUM: CPT

## 2017-02-15 PROCEDURE — 93307 TTE W/O DOPPLER COMPLETE: CPT | Mod: 26,,, | Performed by: INTERNAL MEDICINE

## 2017-02-15 RX ORDER — LISINOPRIL 2.5 MG/1
2.5 TABLET ORAL DAILY
Status: DISCONTINUED | OUTPATIENT
Start: 2017-02-16 | End: 2017-03-01 | Stop reason: HOSPADM

## 2017-02-15 RX ORDER — METOPROLOL TARTRATE 50 MG/1
50 TABLET ORAL 2 TIMES DAILY
Status: DISCONTINUED | OUTPATIENT
Start: 2017-02-15 | End: 2017-02-15

## 2017-02-15 RX ORDER — CEFAZOLIN SODIUM 2 G/50ML
2 SOLUTION INTRAVENOUS
Status: DISPENSED | OUTPATIENT
Start: 2017-02-15 | End: 2017-02-15

## 2017-02-15 RX ORDER — CYCLOBENZAPRINE HCL 5 MG
5 TABLET ORAL 3 TIMES DAILY PRN
Status: DISCONTINUED | OUTPATIENT
Start: 2017-02-15 | End: 2017-03-01 | Stop reason: HOSPADM

## 2017-02-15 RX ORDER — METOPROLOL TARTRATE 25 MG/1
25 TABLET, FILM COATED ORAL 2 TIMES DAILY
Status: DISCONTINUED | OUTPATIENT
Start: 2017-02-15 | End: 2017-02-16

## 2017-02-15 RX ORDER — MAGNESIUM SULFATE HEPTAHYDRATE 40 MG/ML
2 INJECTION, SOLUTION INTRAVENOUS ONCE
Status: COMPLETED | OUTPATIENT
Start: 2017-02-15 | End: 2017-02-15

## 2017-02-15 RX ADMIN — ESCITALOPRAM OXALATE 10 MG: 10 TABLET ORAL at 09:02

## 2017-02-15 RX ADMIN — HEPARIN SODIUM AND DEXTROSE 13.5 UNITS/KG/HR: 10000; 5 INJECTION INTRAVENOUS at 05:02

## 2017-02-15 RX ADMIN — HYDROCODONE BITARTRATE AND ACETAMINOPHEN 1 TABLET: 5; 325 TABLET ORAL at 06:02

## 2017-02-15 RX ADMIN — ARIPIPRAZOLE 5 MG: 5 TABLET ORAL at 09:02

## 2017-02-15 RX ADMIN — METOPROLOL TARTRATE 100 MG: 100 TABLET ORAL at 09:02

## 2017-02-15 RX ADMIN — CEFAZOLIN SODIUM 2 G: 2 SOLUTION INTRAVENOUS at 05:02

## 2017-02-15 RX ADMIN — IPRATROPIUM BROMIDE 0.5 MG: 0.5 SOLUTION RESPIRATORY (INHALATION) at 04:02

## 2017-02-15 RX ADMIN — APIXABAN 10 MG: 5 TABLET, FILM COATED ORAL at 10:02

## 2017-02-15 RX ADMIN — HEPARIN SODIUM AND DEXTROSE 12.5 UNITS/KG/HR: 10000; 5 INJECTION INTRAVENOUS at 02:02

## 2017-02-15 RX ADMIN — SODIUM CHLORIDE: 0.9 INJECTION, SOLUTION INTRAVENOUS at 09:02

## 2017-02-15 RX ADMIN — MEMANTINE HYDROCHLORIDE 10 MG: 5 TABLET ORAL at 10:02

## 2017-02-15 RX ADMIN — HEPARIN SODIUM AND DEXTROSE 16.5 UNITS/KG/HR: 10000; 5 INJECTION INTRAVENOUS at 10:02

## 2017-02-15 RX ADMIN — MEMANTINE HYDROCHLORIDE 10 MG: 5 TABLET ORAL at 09:02

## 2017-02-15 RX ADMIN — HEPARIN SODIUM AND DEXTROSE 16.5 UNITS/KG/HR: 10000; 5 INJECTION INTRAVENOUS at 09:02

## 2017-02-15 RX ADMIN — HEPARIN SODIUM AND DEXTROSE 30 UNITS/KG/HR: 10000; 5 INJECTION INTRAVENOUS at 10:02

## 2017-02-15 RX ADMIN — ATORVASTATIN CALCIUM 10 MG: 10 TABLET, FILM COATED ORAL at 09:02

## 2017-02-15 RX ADMIN — IPRATROPIUM BROMIDE 0.5 MG: 0.5 SOLUTION RESPIRATORY (INHALATION) at 09:02

## 2017-02-15 RX ADMIN — MAGNESIUM SULFATE IN WATER 2 G: 40 INJECTION, SOLUTION INTRAVENOUS at 09:02

## 2017-02-15 RX ADMIN — CYCLOBENZAPRINE HYDROCHLORIDE 5 MG: 5 TABLET, FILM COATED ORAL at 03:02

## 2017-02-15 RX ADMIN — AMPICILLIN AND SULBACTAM 1.5 G: 1; .5 INJECTION, POWDER, FOR SOLUTION INTRAMUSCULAR; INTRAVENOUS at 06:02

## 2017-02-15 RX ADMIN — PANTOPRAZOLE SODIUM 40 MG: 40 TABLET, DELAYED RELEASE ORAL at 09:02

## 2017-02-15 NOTE — PROGRESS NOTES
Notified Dr. Metzger of pt Anti-Xa beinf 1.04.  MD ordered to hold heparin gtt for 1 hour then re check Anti-Xa then resume normal heparin protocol.  Will carry out orders and continue to monitor pt closely.

## 2017-02-15 NOTE — BRIEF OP NOTE
Brief Operative Note  Date: 02/14/2017    Surgeon(s) and Role:     * Aurelia Metzger MD - Fellow     * Rigo Bernard MD - Primary    Pre-op Diagnosis:  Critical lower limb ischemia [I99.8]; Acute L leg ischemia    Post-op Diagnosis:  Same    Procedure(s):  L femoral embolectomy : L CFA, PFA, SFA  L popliteal and tibial embolectomy with #4 and #3 Miguel balloon  L lower leg 4-compartment fasciotomy    Surgeon: Rigo Bernard MD FACS    Anesthesia: General    Findings/Key Components:  Successful embolectomy  2+ L PT pulse  Triphasic L AT/DP  Positive anterior compartment muscle bulging; viable    EBL: 150 ml      Specimens     None        I attest to being present for the procedure and performing the case.  Rigo Bernard MD FACS

## 2017-02-15 NOTE — PROGRESS NOTES
Anti Xa result in therapeutic range at 0.44 after heparin drip being stopped in PACU due to anti Xa of 1.04. Per Dr. Fu, restart drip at previously ordered dose, 12.5 unit/kg/hr, and resume titrating per heparin nomogram.

## 2017-02-15 NOTE — ED NOTES
Two patient identifiers checked and confirmed.    APPEARANCE: Transferred from [facility] for ischemic leg on heparin drip on rate of 10.8. Complains of pain to lower extremities. Pt is on IV Heparin at a rate of 10.8 weight based of 63.5 kg.    NEURO: Awake, alert, appropriate for age, and cooperative with a calm affect; pupils equal and round.   HEENT: Head symmetrical. Bilateral eyes without redness or drainage.  CARDIAC: A-fib rhythm. On cardiac monitor.   RESPIRATORY: Airway is open and patent. Respirations are spontaneous on room air. Normal respiratory effort and rate noted. Bilateral clear lung sounds.   GI/: Abdomen soft and non-distended. Patient is reported to void and stool appropriately for age.  NEUROVASCULAR: Radial pulses +2. Doppler pulse on left femoral artery and popliteal found. Pt states Numbness and tingling present below the left knee. Cool extremely about three finger-births from the left knee. Able to move lower extremeties independently on command. All pulses present on right lower extremity.    MUSCULOSKELETAL: Moves all extremities well; no obvious deformities noted.  SKIN:, mucus membranes moist and pink

## 2017-02-15 NOTE — PLAN OF CARE
Problem: Patient Care Overview  Goal: Plan of Care Review  Outcome: Ongoing (interventions implemented as appropriate)  Pt drowsy, easily aroused to voice. Oriented to person and place only. VSS, afebrile. NSR on telemetry. Pt denies pain. NV checks and vitals currently performed q1h with no change from baseline. LLE wrapped with ACE and C/D/I. Heparin infusing and titrated according to anti-Xa. Combs draining clear yellow urine. Pt is free from falls/injury/trauma. Call bell within reach. Will continue to monitor.

## 2017-02-15 NOTE — PROGRESS NOTES
Progress Note  Vascular Surgery    Admit Date: 2/14/2017  Post-operative Day: 1 Day Post-Op  Hospital Day: 2    SUBJECTIVE:     Follow-up For:  Procedure(s) (LRB):  THROMBECTOMY (Left)  FASCIOTOMY (Left)    No acute events overnight, pain controlled.    Scheduled Meds:   aripiprazole  5 mg Oral Daily    atorvastatin  10 mg Oral Daily    ceFAZolin 2 g/50mL Dextrose IVPB  2 g Intravenous Q8H    escitalopram oxalate  10 mg Oral Daily    ipratropium  0.5 mg Nebulization Q8H    memantine  10 mg Oral BID    metoprolol  5 mg Intravenous Q6H    metoprolol tartrate  100 mg Oral BID    mirtazapine  30 mg Oral QHS    pantoprazole  40 mg Oral Daily     Continuous Infusions:   sodium chloride 0.9% 125 mL/hr at 02/14/17 2256    heparin (porcine) in D5W 12.5 Units/kg/hr (02/15/17 0245)     PRN Meds:hydrocodone-acetaminophen 5-325mg, HYDROmorphone, ondansetron    Review of patient's allergies indicates:  No Known Allergies        OBJECTIVE:     Vital Signs (Most Recent)  Temp: 98.4 °F (36.9 °C) (02/15/17 0400)  Pulse: 82 (02/15/17 0600)  Resp: 16 (02/15/17 0600)  BP: (!) 94/52 (02/15/17 0600)  SpO2: 97 % (02/15/17 0600)    Vital Signs Range (Last 24H):  Temp:  [97.4 °F (36.3 °C)-98.4 °F (36.9 °C)]   Pulse:  []   Resp:  [9-22]   BP: ()/(52-83)   SpO2:  [88 %-97 %]   Arterial Line BP: (100-126)/(51-68)     I & O (Last 24H):  Intake/Output Summary (Last 24 hours) at 02/15/17 0710  Last data filed at 02/15/17 0512   Gross per 24 hour   Intake          2788.22 ml   Output             1000 ml   Net          1788.22 ml     Physical Exam:  General: well developed, no distress  Head: normocephalic, atraumatic  Lungs:  normal respiratory effort  Heart: regular rate and rhythm  Abdomen: soft, non-tender non-distented; bowel sounds normal; no masses,  no organomegaly  Extremities: left groin incision dressing c/d/i; left lower extremity edema incision intact  Pulses: left lower extremity 1+ PT, biphasic AT; right lower  extremity 2+ DP        Laboratory:  CBC:   Recent Labs  Lab 02/15/17  0410   WBC 9.34   RBC 3.52*   HGB 10.5*   HCT 31.9*      MCV 91   MCH 29.8   MCHC 32.9     CMP:   Recent Labs  Lab 02/15/17  0410   *   CALCIUM 8.3*   ALBUMIN 2.6*   PROT 5.9*      K 4.6   CO2 23      BUN 20   CREATININE 1.0   ALKPHOS 88   ALT 21   AST 81*   BILITOT 0.7     Coagulation:   Recent Labs  Lab 02/15/17  0410   INR 1.0   APTT 25.9     Labs within the past 24 hours have been reviewed.        ASSESSMENT/PLAN:     Assessment: 81 yo F with dementia, HTN, paroxysmal atrial fibrillation with left common femoral,popliteal embolus POD 1 left femoral embolectomy of CFA, PFA and SFA with popliteal and tibial embolectomy and LLE fasciotomy,doing well.    Plan:   Neuro: prn pain control; baseline dementia, oriented to person  Pulm: pulmonary toilet  CVS: currently normal sinus, metoprolol 100 mg po bid   ECHO pending   Cardiology consult for recommendations  GI: advance cardiac diet  FEN: continue IVF, creatinine 1.0 (1.3)   Replace electrolytes prn   D/c stinson  Heme: continue heparin gtt, currently therapeutic, trend anti Xa   Hgb stable  ID: afebrile, ancef perioperative   S/p diflucan x1 dose for fungal skin infection   Urine culture pending  PT/OT; plan to change fasciotomy dressing this AM    Aurelia Metzger, DO  PGY-6, Vascular fellow   [phone]    Vascular Attending  Agree with above  Appreciate Cards input  2+ L PT pulse; triphasic L AT/DP  Close fasciotomy at bedside today with local anesthesia    Eliquis 2.5 mg bid then qd  ASA 81 qd; statin rx  PT  Placement     Rigo Bernard MD FACS

## 2017-02-15 NOTE — TRANSFER OF CARE
"Anesthesia Transfer of Care Note    Patient: Boubacar Chen    Procedure(s) Performed: Procedure(s) (LRB):  THROMBECTOMY (Left)  FASCIOTOMY (Left)    Patient location: PACU    Anesthesia Type: general    Transport from OR: Transported from OR on 6-10 L/min O2 by face mask with adequate spontaneous ventilation    Post pain: adequate analgesia    Post assessment: no apparent anesthetic complications    Post vital signs: stable    Level of consciousness: awake and alert    Nausea/Vomiting: no nausea/vomiting    Complications: none          Last vitals:   Visit Vitals    /65    Pulse 100    Temp 36.7 °C (98.1 °F) (Oral)    Resp (!) 22    Ht 5' 6" (1.676 m)    Wt 63.5 kg (140 lb)    SpO2 97%    BMI 22.6 kg/m2     "

## 2017-02-15 NOTE — OP NOTE
[facility]  Vascular Surgery  Operative Note    SUMMARY     Date of Procedure: 2/14/2017     Procedure:   L femoral embolectomy : L CFA, PFA, SFA  L popliteal and tibial embolectomy with #4 and #3 Miguel balloon  L lower leg 4-compartment fasciotomy     Surgeon(s) and Role:     * Aurelia Metzger MD - Fellow     * Rigo Bernard MD - Primary    Assisting Surgeon: None    Pre-Operative Diagnosis: Critical lower limb ischemia [I99.8]; acute leg ischemia    Post-Operative Diagnosis:same    Anesthesia: General/MAC     Indication for operation: 83 yo F with HTN, dementia presenting with left common femoral embolus with Rutheford IIb limb ischemia, motor and sensory deficit in left foot; presenting with paroxysmal atrial fibrillation, has not been receiving metoprolol at nursing home, likely cause of embolus.    Description of the Findings of the Procedure: Successful embolectomy  2+ L PT pulse  Triphasic L AT/DP  Positive anterior compartment muscle bulging; viable    Complications: No    Estimated Blood Loss (EBL): 200 mL           Implants: none  Specimens:   Specimen     None                  Condition: Good    Disposition: PACU - hemodynamically stable.     Operation in detail: Dictation #1  MRN:[id number]  CSN:[id number]  [id number]      OPERATION IN DETAIL:  Consent was obtained from the patient's , power of    and the patient was brought into the Operating Room and placed in   supine position.  IV antibiotics were given and the patient general anesthesia   was induced.  A left radial A-line was placed and a Combs was placed and the   patient's left lower extremity was prepped circumferentially, bilateral groins   prepped and draped.  The patient prior to operation ACT was 159, she has bolus   of 6000 units of IV heparin with ACT titrated for greater than 250 throughout   the case.  An oblique incision was made in the patient's left groin with a #15   blade scalpel with Bovie  electrocautery used to dissect subcutaneous tissues.    Lymphatic vessels and venous branches were tied with silk sutures.  Dissection   was carried down to left common femoral artery, which was dissected free using   Metzenbaum scissors.  The left common femoral artery was circumferentially   dissected and controlled with vessel loop and knot placed on tension.    Dissection was then carried distally to profunda and SFA, which were   individually isolated with vessel loops but not placed on tension.  The   common femoral artery was controlled with profunda clamp, transverse arteriotomy   was made just proximal to the bifurcation of the SFA and profunda artery with   no back bleeding noted.  A #3 Miguel catheter was passed distally into the SFA.    Balloon gently inflated with gentle retraction with minimal clot expressed,   SFA was then occluded with #3 Miguel introduced into the profunda, gentle   retraction with balloon inflation was performed with moderate amount of thrombus   returned.  This was performed again with more thrombus returned and   backbleeding noted.  A #4 Miguel was then passed down the SFA with a gentle   retraction on the profunda and it was able to be passed to 70 cm and was   withdrawn under gentle traction with a large amount of clot returned and a   strong back bleeding noted.  A #3 Fogerty was then passed proximally with   thrombus returned and good antegrade flow noted.  The profunda was controlled   with a profunda clamp and attention was placed on the SFA and the common femoral   artery.  The transverse arteriotomy was closed with two 6-0 Prolene sutures,   one started on each end.  Sutures performed in running fashion to meet in the   middle, attention was paid to avoid any dissection flaps.  Prior to completion   of the anastomosis, profunda and SFA were opened and anastomosis was completed.    The common femoral artery was opened.  Doppler was turned down with strong   Doppler  signal distal to the transverse arteriotomy, triphasic PT initially and   biphasic AT.  Secondary to length of time and ischemia and motor deficit,   decision was made to perform a 4-compartment fasciotomy.  A longitudinal   incision on the medial aspect of the calf was performed using a #15 blade.  The   superficial posterior compartment was identified and opened with Bovie   electrocautery.  Muscle was taken off the tibia and deep posterior compartment   was entered.  Fasciotomy incisions were carried with Metzenbaum scissors   inferior and superiorly.  Lateral fasciotomy incision was then performed   approximately 1 cm away from the tibial plateau on the lateral aspect with a #15   blade, saphenous vein was identified and ligated.  The anterior compartment was   identified and entered with Bovie electrocautery.  Fasciotomy incision was   continued with Metzenbaum scissors superiorly and inferiorly.  The septum was   identified and the lateral compartment was identified inferior to this and   opened with Bovie electrocautery with fasciotomy incision extended superiorly   and inferiorly.  Of note, there was bulging in the anterior compartment.  Almost   all compartments were bovied and muscle was noted to be viable.  Extensive   hemostasis was required in both the medial and lateral incisions of the   fasciotomy with suture ligation of venous branches and muscle bleeding.    Attention then turned to the left groin where hemostasis was obtained.  The   groin was closed with 2-0 running Vicryl for the deep fascia followed by 2   layers of 2-0 Vicryl in running fashion followed by a subdermal 3-0 Vicryl   followed by a 4-0 Monocryl running.  Secondary to prior fungal infection of the   left groin, decision made to put Telfa and 4 x 4's with Tegaderm on this   incision.  Attention then turned to the medial and lateral fasciotomy incisions   with more hemostasis obtained with Bovie electrocautery of muscle and    subcutaneous bleeding vessels.  Decision made to pack incisions.  Surgicel was   packed followed by Kerlix, followed by 4 x 4's, ABDs and an Ace wrap.  The   patient had a palpable PT at the end of case with triphasic DP.  She was   extubated and brought to PACU in stable condition.    DICTATED BY:  Aurelia Metzger D.O. (RES)      TATA  dd: 02/14/2017 23:00:48 (CST)  td: 02/15/2017 02:28:57 (CST)  Doc ID   #[id number]  Job ID #[id number]    CC:

## 2017-02-15 NOTE — CONSULTS
"  Consult Note  Vascular Surgery    Consult Requested By: ED  Reason for Consult: left lower limb ischemia    SUBJECTIVE:     History of Present Illness:  Patient is a 82 y.o. female with history of dementia, HTN, HLD presents with left lower leg pain.  Patient lives in a nursing home, arrived at [facility] approximately 1 pm with complaints of left limb pain that started earlier that day.  At [facility] was noted to be in paroxysmal atrial fibrillation, noted to have no pulse in left foot.  US arterial showed left distal CFA occlusion at level of bifurcation of profunda and SFA.  She was heparinized and transferred to [facility] [place].  Patient reports pain starts at left foot and travels to knee.  Is having difficulty with movement of her left foot.  She is oriented to person and place, not to time, is unable to answer questions regarding some of her medical history, unable to pinpoint starting time of pain.   at bedside reports that she has a history of an "irregular heart rate" but has never been on a blood thinner.  Reports a remote history of stroke with no deficits (10 years prior).  Denies h/o MI/PCI. Denies history of any lower extremity angiograms/surgeries.  Prior smoker quit approximately 20 years ago, smoked 25 years 1ppd      Scheduled Meds:  Continuous Infusions:  PRN Meds:  Medication list from nursing home  Spironolactone 25mg mon, wed, fir, sat  Aripiprazole 10 mg po daily  Atorvastatin 10 mg qhs  lexapro 10 mg po daily  Triamterene/hctz 37.5/25 mg po daily  Mirtazapine 30 mg po qhs  Donepezil 10 mg po bid  Ferrous sulfate 325 mg  namenda 10 mg po daily  fioricet 1 tab po prn  zofran 4mg po prn    Review of patient's allergies indicates:  No Known Allergies    Past Medical History   Diagnosis Date    Anxiety     Arthritis     Dementia     Former smoker     Hyperlipidemia     Hypertension     Stroke      at age 58     Past Surgical History   Procedure Laterality Date    " Hysterectomy       total    Anterior vaginal repair  1960's    Colonoscopy  2008    Upper gastrointestinal endoscopy  2008      h. pylori    Joint replacement       bilateral hip    Colonoscopy N/A 1/5/2016     Procedure: COLONOSCOPY;  Surgeon: Daniel Alicea MD;  Location[facility];  Service: Endoscopy;  Laterality: N/A;     Family History   Problem Relation Age of Onset    Colon cancer Mother      in late 60s    Colon cancer Father      Social History   Substance Use Topics    Smoking status: Former Smoker     Packs/day: 1.00     Years: 25.00     Types: Cigarettes     Quit date: 10/7/1998    Smokeless tobacco: Not on file    Alcohol use No      Comment: occ        Review of Systems:  Peripheral Vascular: Ischemic Rest Pain: left foot  Constitutional: no fever or chills  Respiratory: no cough or shortness of breath  Cardiovascular: no chest pain or palpitations  Gastrointestinal: no nausea or vomiting, tolerating diet  Integument/Breast: no rash or pruritis  Hematologic/Lymphatic: no easy bruising or lymphadenopathy  Neurological: no seizures or tremors    OBJECTIVE:     Vital Signs (Most Recent)  Temp: 98.1 °F (36.7 °C) (02/14/17 1754)  Pulse: 78 (02/14/17 1754)  Resp: 17 (02/14/17 1754)  BP: 136/82 (02/14/17 1754)  SpO2: 97 % (02/14/17 1754)    Vital Signs Range (Last 24H):  Temp:  [97.5 °F (36.4 °C)-98.1 °F (36.7 °C)]   Pulse:  []   Resp:  [17-18]   BP: (119-136)/(67-83)   SpO2:  [95 %-97 %]     Physical Exam:  General: well developed, well nourished, appears stated age, no distress  Head: normocephalic, atraumatic  Eyes:  conjunctivae/corneas clear.  Lungs:  clear to auscultation bilaterally and normal respiratory effort  Heart: irregularly irregular rhythm  Abdomen: soft, non-tender non-distented; bowel sounds normal; no masses,  no organomegaly and aorta not palpable  Extremities: left lower extremity cool compared to right starting just distal to knee; left groin with evidence of fungal  infection; evidence of chronic venous stasis disease bilaterally   Pulses: brachial, radial 2+ bilaterally; right lower extremity weak 2+ femoral; 2+ DP, biphasic PT; left lower extremity: monophasic femoral, monophasic popliteal, no signals distally  Neurologic: follows commands; decreased motor to ankle and toes; sensation intact but decreased    Laboratory:  CBC:   Recent Labs  Lab 02/14/17  1326   WBC 9.60   RBC 4.06   HGB 11.8*   HCT 36.0*        CMP:   Recent Labs  Lab 02/14/17  1326   *   CALCIUM 9.4   ALBUMIN 3.3*   PROT 6.9      K 3.8   CO2 23      BUN 28*   CREATININE 1.3   ALKPHOS 102   ALT 9*   AST 16   BILITOT 0.7     Coagulation:   Recent Labs  Lab 02/14/17  1326   INR 1.0   APTT 28.2       Diagnostic Results:  Vascular Lab Results: Reviewed   Us arterial:      No elevated systolic velocity suggesting hemodynamically significant narrowing in the visualized arteries of the right lower extremity    Left lower extremity; occluded left common femoral artery and proximal superficial femoral artery with minimal flow seen in the mid to distal superficial femoral artery and proximal popliteal artery, with occluded distal popliteal artery and anterior tibial and peroneal are not visualized likely occluded and very low flow in the posterior tibial artery    ASSESSMENT/PLAN:     Patient is a 82 y.o. female with history of dementia, HTN, HLD presents with paroxysmal atrial fibrillation and Rutheford class II ischemia of left lower extremity.    To OR for emergent left femoral cutdown and embolectomy, possible completion angiogram, possible fasciotomy  Consent obtained from , Param Chen [phone]  Type and screen and crossmatch  Plan for ICU admission postoperatively    Aurelia Metzger DO  PGY-6, Vascular fellow   [phone]    Vascular Attending  Agree with above  Acutely ischemic L leg in this 81 yo woman with dementia and apparent paroxysmal Afib  For L CFA approach /  embolectomy, possible fasciotomy / completion angio  Iv heparin, IV Abx  ?UTI, will need Cx    Rigo Bernard MD FACS

## 2017-02-15 NOTE — ANESTHESIA PROCEDURE NOTES
Arterial    Diagnosis: Peripheral artery disease    Patient location during procedure: done in OR  Procedure start time: 2/14/2017 7:20 PM  Procedure end time: 2/14/2017 7:30 PM  Staffing  Anesthesiologist: ZULEIKA QUIGLEY  Anesthesiologist was present at the time of the procedure.  Preanesthetic Checklist  Completed: patient identified, site marked, surgical consent, pre-op evaluation, timeout performed, IV checked, risks and benefits discussed, monitors and equipment checked and anesthesia consent given  Arterial Line  Skin Prep: chlorhexidine gluconate and isopropyl alcohol  Local Infiltration: none  Orientation: left  Location: radial  Catheter Size: 22 G{[facility] ANESTHESIA BLOCK ART PLACEMENTInsertion Attempts: 2  Assessment  Dressing: secured with tape and tegaderm  Patient: Tolerated well

## 2017-02-15 NOTE — ANESTHESIA PREPROCEDURE EVALUATION
02/14/2017  Boubacar Chen is a 82 y.o., female.    Pre-operative evaluation for Procedure(s) (LRB):  THROMBECTOMY (Left)  ANGIOGRAM-EXTREMITY-LOWER (Left)    Boubacar Chen is a 82 y.o. female w/ h/o PAfib (currently NSR, in Afib at outside hospital), HTN (unsure if she has this or not), stroke(>20y ago, no residual issues) and dementia who presents to the ER with c/o bilateral lower extremity pain (L>R) that is a chronic problem now being evaluated for the above procedure as a class A.     Per pt and , last meal either breakfast this AM or yesterday's dinner. Pt received all her home meds, however, pt on a BB on the med list in our system, but not on a BB on the list provided by the facility. Pt and  unsure if she took/takes it. Currently on a heparin ggt. Good pulse on RLE, femoral dopplerable on R.     LDA:   20g R and L hand    Prev airway: none in system    Drips: heparin    Patient Active Problem List   Diagnosis    Lower urinary tract infectious disease    Diarrhea    Epigastric abdominal pain    Melena    Lower limb ischemia    Paroxysmal atrial fibrillation       Review of patient's allergies indicates:  No Known Allergies     No current facility-administered medications on file prior to encounter.      Current Outpatient Prescriptions on File Prior to Encounter   Medication Sig Dispense Refill    aripiprazole (ABILIFY) 10 MG Tab Take 5 mg by mouth once daily.      atorvastatin (LIPITOR) 10 MG tablet Take 10 mg by mouth once daily.       butalbital-acetaminophen-caffeine -40 mg (FIORICET) -40 mg per tablet Take 1 tablet by mouth every 4 (four) hours as needed for Pain.      donepezil (ARICEPT) 10 MG tablet Take 10 mg by mouth once daily.       escitalopram (LEXAPRO) 10 MG tablet Take 10 mg by mouth once daily.       ginkgo biloba 60 mg Cap Take 1  tablet by mouth once daily.      metoprolol succinate (TOPROL-XL) 100 MG 24 hr tablet Take 100 mg by mouth once daily.       mirabegron (MYRBETRIQ) 25 mg Tb24 ER tablet Take 1 tablet (25 mg total) by mouth once daily. 30 tablet 11    mirtazapine (REMERON) 30 MG tablet Take 30 mg by mouth every evening.      NAMENDA 10 mg Tab 10 mg 2 (two) times daily.       pantoprazole (PROTONIX) 40 MG tablet Take 1 tablet (40 mg total) by mouth once daily. 30 tablet 6    triamterene-hydrochlorothiazide 37.5-25 mg (DYAZIDE) 37.5-25 mg per capsule Take 1 capsule by mouth every morning.         Past Surgical History   Procedure Laterality Date    Hysterectomy       total    Anterior vaginal repair  1960's    Colonoscopy  2008    Upper gastrointestinal endoscopy  2008      h. pylori    Joint replacement       bilateral hip    Colonoscopy N/A 1/5/2016     Procedure: COLONOSCOPY;  Surgeon: Daniel Alicea MD;  Location: [facility];  Service: Endoscopy;  Laterality: N/A;       Social History     Social History    Marital status:      Spouse name: N/A    Number of children: N/A    Years of education: N/A     Occupational History    Not on file.     Social History Main Topics    Smoking status: Former Smoker     Packs/day: 1.00     Years: 25.00     Types: Cigarettes     Quit date: 10/7/1998    Smokeless tobacco: Not on file    Alcohol use No      Comment: occ    Drug use: No    Sexual activity: Not on file     Other Topics Concern    Not on file     Social History Narrative         Vital Signs Range (Last 24H):  Temp:  [36.4 °C (97.5 °F)-36.7 °C (98.1 °F)]   Pulse:  []   Resp:  [17-18]   BP: (119-136)/(67-83)   SpO2:  [95 %-97 %]       CBC:   Recent Labs      02/14/17   1326   WBC  9.60   RBC  4.06   HGB  11.8*   HCT  36.0*   PLT  239   MCV  89   MCH  29.0   MCHC  32.8       CMP:   Recent Labs      02/14/17   1326   NA  138   K  3.8   CL  102   CO2  23   BUN  28*   CREATININE  1.3   GLU  119*   CALCIUM   9.4   ALBUMIN  3.3*   PROT  6.9   ALKPHOS  102   ALT  9*   AST  16   BILITOT  0.7       INR  Recent Labs      02/14/17   1326   INR  1.0   APTT  28.2           Diagnostic Studies:      EKG:  Normal sinus rhythm  Possible Left atrial enlargement  Borderline Abnormal ECG  No previous ECGs available  Confirmed by VIVIENNE VANN MD ([id number]) on 2/15/2016 10:28:24 AM      [facility] Anesthesia Evaluation    I have reviewed the Patient Summary Reports.    I have reviewed the Nursing Notes.   I have reviewed the Medications.     Review of Systems  Anesthesia Hx:  No problems with previous Anesthesia  History of prior surgery of interest to airway management or planning: Previous anesthesia: General Denies Family Hx of Anesthesia complications.   Denies Personal Hx of Anesthesia complications.   Social:  Smoker    Hematology/Oncology:     Oncology Normal    -- Anemia:   EENT/Dental:EENT/Dental Normal   Cardiovascular:   Exercise tolerance: poor Hypertension Dysrhythmias atrial fibrillation ECG has been reviewed.    Pulmonary:  Pulmonary Normal On 2L NC in room, oxygenating well   Renal/:  Renal/ Normal     Hepatic/GI:  Hepatic/GI Normal    Musculoskeletal:  Musculoskeletal Normal    Neurological:   CVA, no residual symptoms  Dementia moderate    Endocrine:  Endocrine Normal    Psych:   Psychiatric History          Physical Exam  General:  Well nourished    Airway/Jaw/Neck:  Airway Findings: Mouth Opening: Normal Tongue: Normal  General Airway Assessment: Adult  Mallampati: II  Improves to I with phonation.  TM Distance: Normal, at least 6 cm  Jaw/Neck Findings:  Neck ROM: Extension Decreased, Mild     Eyes/Ears/Nose:  EYES/EARS/NOSE FINDINGS: Normal   Dental:  Dental Findings: In tact   Chest/Lungs:  Chest/Lungs Findings: Clear to auscultation, Normal Respiratory Rate     Heart/Vascular:  Heart Findings: Rate: Tachycardia  Rhythm: Regular Rhythm  Sounds: Normal  Heart murmur: negative Vascular Findings:  Vascular Exam  Findings: Good pulse on RLE (DP), femoral dopplerable on R     Abdomen:  Abdomen Findings: Normal    Musculoskeletal:  Musculoskeletal Findings: Normal   Skin:  Skin Findings:  Erythema     Mental Status:  Mental Status Findings:  Confusion         Anesthesia Plan  Type of Anesthesia, risks & benefits discussed:  Anesthesia Type:  general  Patient's Preference: General/MAC  Intra-op Monitoring Plan: arterial line, central line and standard ASA monitors  Intra-op Monitoring Plan Comments:   Post Op Pain Control Plan:   Post Op Pain Control Plan Comments: IV meds as needed  Induction:   IV  Beta Blocker:  Patient is on a Beta-Blocker and has not received dose within the past 24 hours due to non-compliance or for other reasons (Patient should receive a perioperative dose or document why it is withheld). Perioperative Beta Blocker not given due to: Other (see comments)      Informed Consent: Patient representative understands risks and agrees with Anesthesia plan.  Questions answered. Anesthesia consent signed with patient representative.  ASA Score: 3  emergent   Day of Surgery Review of History & Physical:    H&P update referred to the surgeon.     Anesthesia Plan Notes: Consents completed w/ . All questions answered. Unsure if pt is still on a BB or if she is, if she took it.        Ready For Surgery From Anesthesia Perspective.

## 2017-02-15 NOTE — ANESTHESIA POSTPROCEDURE EVALUATION
"Anesthesia Post Evaluation    Patient: Boubacar Chen    Procedure(s) Performed: Procedure(s) (LRB):  THROMBECTOMY (Left)  FASCIOTOMY (Left)    Final Anesthesia Type: general  Patient location during evaluation: PACU  Patient participation: Yes- Able to Participate  Level of consciousness: awake and alert and confused (likely her baseline)  Post-procedure vital signs: reviewed and stable  Pain management: adequate  Airway patency: patent  PONV status at discharge: No PONV  Anesthetic complications: no      Cardiovascular status: blood pressure returned to baseline and hemodynamically stable  Respiratory status: spontaneous ventilation, unassisted and nasal cannula (4L, 85%on RA @ admit)  Hydration status: euvolemic  Follow-up not needed.        Visit Vitals    BP (!) 100/58    Pulse 84    Temp 36.6 °C (97.9 °F) (Axillary)    Resp 17    Ht 5' 6" (1.676 m)    Wt 63.5 kg (140 lb)    SpO2 (!) 91%    BMI 22.6 kg/m2       Pain/Taryn Score: Pain Assessment Performed: Yes (2/14/2017 11:40 PM)  Presence of Pain: denies (2/14/2017 11:40 PM)  Taryn Score: 8 (2/14/2017 11:40 PM)      "

## 2017-02-15 NOTE — PLAN OF CARE
Sw awaiting PT/OT recs to inform d/c plan. Sw informed by MD that Pt was living at a NH prior to admit. Pt d/c may be a return to NH, as a SNF Pt, if appropriate. Sw will continue to follow.      Mame Johnston, JOHN [phone]

## 2017-02-15 NOTE — OP NOTE
Date of Procedure: 2/14/2017      Procedure:   L femoral embolectomy : L CFA, PFA, SFA  L popliteal and tibial embolectomy with #4 and #3 Miguel balloon  L lower leg 4-compartment fasciotomy     Surgeon(s) and Role:  * Rigo Bernadr MD - Primary  * Aurelia Metzger MD - Fellow     Pre-Operative Diagnosis: Critical lower limb ischemia [I99.8]; acute L leg ischemia; [place] class IIB / motor, sensory deficit of L foot     Post-Operative Diagnosis: Same     Anesthesia: General/MAC     Indication for operation: 83 yo F with HTN, dementia presenting with left common femoral embolus with [place] IIb limb ischemia, motor and sensory deficit in left foot; presenting with paroxysmal atrial fibrillation, has not been receiving metoprolol at nursing home, likely cause of embolus.     Description of the Findings of the Procedure: Successful embolectomy  2+ L PT pulse  Triphasic L AT/DP  Positive anterior compartment muscle bulging; viable     Complications: No     Estimated Blood Loss (EBL): 200 mL      Implants: none    Specimens: L arterial thrombus / clot    Condition: Good     Disposition: PACU - hemodynamically stable.      DATE OF PROCEDURE:  02/14/2017    OPERATION IN DETAIL:  Consent was obtained from the patient's , power of    and the patient was brought into the Operating Room and placed in   supine position.  IV antibiotics were given and the patient general anesthesia   was induced.  A left radial A-line was placed and a Combs was placed and the   patient's left lower extremity was prepped circumferentially, bilateral groins   prepped and draped.  The patient prior to operation ACT was 159, she has bolus   of 6000 units of IV heparin with ACT titrated for greater than 250 throughout   the case.  An oblique incision was made in the patient's left groin with a #15   blade scalpel with Bovie electrocautery used to dissect subcutaneous tissues.    Lymphatic vessels and venous branches were  tied with silk sutures.  Dissection   was carried down to left common femoral artery, which was dissected free using   Metzenbaum scissors.  The left common femoral artery was circumferentially   dissected and controlled with vessel loop and knot placed on tension.    Dissection was then carried distally to profunda and SFA, which were   individually isolated with vessel loops  but not placed on tension.  The   common femoral artery was controlled with profunda clamp, transverse arteriotomy   was made just proximal to the bifurcation of the SFA and profunda artery with   no back bleeding noted.  A #3 Miguel catheter was passed distally into the SFA.    Balloon gently inflated with gentle retraction with minimal clot expressed,   SFA was then occluded with #3 Miguel introduced into the profunda, gentle   retraction with balloon inflation was performed with moderate amount of thrombus   returned.  This was performed again with more thrombus returned and   backbleeding noted.  A #4 Miguel was then passed down the SFA with a gentle   retraction on the profunda and it was able to be passed to 70 cm and was   withdrawn under gentle traction with a large amount of clot returned and a   strong back bleeding noted.  A #3 Fogerty was then passed proximally with   thrombus returned and good antegrade flow noted.  The profunda was controlled   with a profunda clamp and attention was placed on the SFA and the common femoral   artery.  The transverse arteriotomy was closed with two 6-0 Prolene sutures,   one started on each end.  Sutures performed in running fashion to meet in the   middle, attention was paid to avoid any dissection flaps.  Prior to completion   of the anastomosis, profunda and SFA were opened and anastomosis was completed.    The common femoral artery was opened.  Doppler was turned down with strong   Doppler signal distal to the transverse arteriotomy, triphasic PT initially and   biphasic AT.  Secondary to  length of time and ischemia and motor deficit,   decision was made to perform a 4-compartment fasciotomy.  A longitudinal   incision on the medial aspect of the calf was performed using a #15 blade.  The   superficial posterior compartment was identified and opened with Bovie   electrocautery.  Muscle was taken off the tibia and deep posterior compartment   was entered.  Fasciotomy incisions were carried with Metzenbaum scissors   inferior and superiorly.  Lateral fasciotomy incision was then performed   approximately 1 cm away from the tibial plateau on the lateral aspect with a #15   blade, saphenous vein was identified and ligated.  The anterior compartment was   identified and entered with Bovie electrocautery.  Fasciotomy incision was   continued with Metzenbaum scissors superiorly and inferiorly.  The septum was   identified and the lateral compartment was identified inferior to this and   opened with Bovie electrocautery with fasciotomy incision extended superiorly   and inferiorly.  Of note, there was bulging in the anterior compartment.  Almost   all compartments were bovied and muscle was noted to be viable.  Extensive   hemostasis was required in both the medial and lateral incisions of the   fasciotomy with suture ligation of venous branches and muscle bleeding.    Attention then turned to the left groin where hemostasis was obtained.  The   groin was closed with 2-0 running Vicryl for the deep fascia followed by 2   layers of 2-0 Vicryl in running fashion followed by a subdermal 3-0 Vicryl   followed by a 4-0 Monocryl running.  Secondary to prior fungal infection of the   left groin, decision made to put Telfa and 4 x 4's with Tegaderm on this   incision.  Attention then turned to the medial and lateral fasciotomy incisions   with more hemostasis obtained with Bovie electrocautery of muscle and   subcutaneous bleeding vessels.  Decision made to pack incisions.  Surgicel was   packed followed by Kerlix,  followed by 4 x 4's, ABDs and an Ace wrap.  The   patient had a palpable PT at the end of case with triphasic DP.  She was   extubated and brought to PACU in stable condition.    DICTATED BY:  Aurelia Metzger D.O. (RES)      JUNAID/SCOTTIE  dd: 02/14/2017 23:00:48 (CST)  td: 02/15/2017 02:28:57 (CST)  Doc ID   #[id number]  Job ID #[id number]    CC:

## 2017-02-15 NOTE — ANESTHESIA RELEASE NOTE
"Anesthesia Release from PACU Note    Patient: Boubacar Chen    Procedure(s) Performed: Procedure(s) (LRB):  THROMBECTOMY (Left)  FASCIOTOMY (Left)    Anesthesia type: general    Post pain: Adequate analgesia    Post assessment: no apparent anesthetic complications    Last Vitals:   Visit Vitals    /69    Pulse 84    Temp 36.6 °C (97.9 °F) (Axillary)    Resp 17    Ht 5' 6" (1.676 m)    Wt 63.5 kg (140 lb)    SpO2 (!) 91%    BMI 22.6 kg/m2       Post vital signs: stable    Level of consciousness: awake, confused and responds to stimulation, pt likely at her baseline    Nausea/Vomiting: no nausea/no vomiting    Complications: none    Airway Patency: patent    Respiratory: unassisted, spontaneous ventilation, nasal cannula (4L NC, pt 85% on RA @ admit)    Cardiovascular: stable and blood pressure at baseline    Hydration: euvolemic  "

## 2017-02-15 NOTE — NURSING TRANSFER
Nursing Transfer Note      2/15/2017     Transfer To: [id number]    Transfer via bed    Transfer with 4L to O2, cardiac monitoring    Transported by RN and PCT    Chart send with patient: Yes    Notified: spouse    Patient reassessed at: 02/15/2017 0040

## 2017-02-15 NOTE — PLAN OF CARE
CM met with pt and , pt is a resident at [facility] on [facility].      Plan:  Pt will return to NH w/ possible SNF.  On 2/14 pt had L fem embolectomy w/ 4 compartment fasciotomy.        02/15/17 1548   Discharge Assessment   Assessment Type Discharge Planning Assessment   Confirmed/corrected address and phone number on facesheet? Yes   Assessment information obtained from? Caregiver   Prior to hospitilization cognitive status: Alert/Oriented   Prior to hospitalization functional status: Completely Dependent   Current cognitive status: Alert/Oriented   Current Functional Status: Completely Dependent   Arrived From [facility]  (Transferred over from [facility])   Lives With facility resident   Able to Return to Prior Arrangements yes   Is patient able to care for self after discharge? No   How many people do you have in your home that can help with your care after discharge? 0   Patient's perception of discharge disposition nursing home care facility   Readmission Within The Last 30 Days no previous admission in last 30 days   Patient currently being followed by outpatient case management? No   Patient currently receives home health services? No   Does the patient currently use HME? No   Patient currently receives private duty nursing? No   Patient currently receives any other outside agency services? No   Do you have any problems affording any of your prescribed medications? No   Is the patient taking medications as prescribed? yes   Do you have any financial concerns preventing you from receiving the healthcare you need? No   Does the patient have transportation to healthcare appointments? No   On Dialysis? No   Does the patient receive services at the [facility]mad[facility]? No   Are there any open cases? No   Discharge Plan A Return to nursing home   Discharge Plan B Skilled Nursing Facility   Patient/Family In Agreement With Plan yes     Mildred Rubio RN, CCM [facility]  [facility]  SICU/ENT/Vascular Surgery  Ext [phone]

## 2017-02-16 LAB
ANION GAP SERPL CALC-SCNC: 9 MMOL/L
BACTERIA UR CULT: NORMAL
BASOPHILS # BLD AUTO: 0.02 K/UL
BASOPHILS NFR BLD: 0.2 %
BUN SERPL-MCNC: 18 MG/DL
CALCIUM SERPL-MCNC: 8.3 MG/DL
CHLORIDE SERPL-SCNC: 102 MMOL/L
CO2 SERPL-SCNC: 26 MMOL/L
CREAT SERPL-MCNC: 1 MG/DL
DIFFERENTIAL METHOD: ABNORMAL
EOSINOPHIL # BLD AUTO: 0.2 K/UL
EOSINOPHIL NFR BLD: 2.8 %
ERYTHROCYTE [DISTWIDTH] IN BLOOD BY AUTOMATED COUNT: 13.4 %
EST. GFR  (AFRICAN AMERICAN): >60 ML/MIN/1.73 M^2
EST. GFR  (NON AFRICAN AMERICAN): 52.6 ML/MIN/1.73 M^2
GLUCOSE SERPL-MCNC: 104 MG/DL
HCT VFR BLD AUTO: 29.3 %
HGB BLD-MCNC: 9.5 G/DL
LYMPHOCYTES # BLD AUTO: 1 K/UL
LYMPHOCYTES NFR BLD: 11.8 %
MAGNESIUM SERPL-MCNC: 1.6 MG/DL
MCH RBC QN AUTO: 29.4 PG
MCHC RBC AUTO-ENTMCNC: 32.4 %
MCV RBC AUTO: 91 FL
MONOCYTES # BLD AUTO: 0.7 K/UL
MONOCYTES NFR BLD: 9 %
NEUTROPHILS # BLD AUTO: 6.2 K/UL
NEUTROPHILS NFR BLD: 75.8 %
PHOSPHATE SERPL-MCNC: 3.5 MG/DL
PLATELET # BLD AUTO: 202 K/UL
PMV BLD AUTO: 9.3 FL
POTASSIUM SERPL-SCNC: 4.3 MMOL/L
RBC # BLD AUTO: 3.23 M/UL
SODIUM SERPL-SCNC: 137 MMOL/L
WBC # BLD AUTO: 8.22 K/UL

## 2017-02-16 PROCEDURE — 27000221 HC OXYGEN, UP TO 24 HOURS

## 2017-02-16 PROCEDURE — 25000003 PHARM REV CODE 250: Performed by: SURGERY

## 2017-02-16 PROCEDURE — 85025 COMPLETE CBC W/AUTO DIFF WBC: CPT

## 2017-02-16 PROCEDURE — 80048 BASIC METABOLIC PNL TOTAL CA: CPT

## 2017-02-16 PROCEDURE — 94799 UNLISTED PULMONARY SVC/PX: CPT

## 2017-02-16 PROCEDURE — 99232 SBSQ HOSP IP/OBS MODERATE 35: CPT | Mod: ,,, | Performed by: INTERNAL MEDICINE

## 2017-02-16 PROCEDURE — 84100 ASSAY OF PHOSPHORUS: CPT

## 2017-02-16 PROCEDURE — 97530 THERAPEUTIC ACTIVITIES: CPT

## 2017-02-16 PROCEDURE — 11000001 HC ACUTE MED/SURG PRIVATE ROOM

## 2017-02-16 PROCEDURE — 83735 ASSAY OF MAGNESIUM: CPT

## 2017-02-16 PROCEDURE — 94640 AIRWAY INHALATION TREATMENT: CPT

## 2017-02-16 PROCEDURE — 36415 COLL VENOUS BLD VENIPUNCTURE: CPT

## 2017-02-16 PROCEDURE — 97161 PT EVAL LOW COMPLEX 20 MIN: CPT

## 2017-02-16 PROCEDURE — 63600175 PHARM REV CODE 636 W HCPCS: Performed by: SURGERY

## 2017-02-16 RX ORDER — ARIPIPRAZOLE 5 MG/1
5 TABLET ORAL DAILY
Status: COMPLETED | OUTPATIENT
Start: 2017-02-17 | End: 2017-02-22

## 2017-02-16 RX ORDER — METOPROLOL TARTRATE 25 MG/1
50 TABLET, FILM COATED ORAL 2 TIMES DAILY
Status: DISCONTINUED | OUTPATIENT
Start: 2017-02-16 | End: 2017-03-01 | Stop reason: HOSPADM

## 2017-02-16 RX ORDER — ASPIRIN 81 MG/1
81 TABLET ORAL DAILY
Status: DISCONTINUED | OUTPATIENT
Start: 2017-02-16 | End: 2017-03-01 | Stop reason: HOSPADM

## 2017-02-16 RX ORDER — LIDOCAINE HCL/EPINEPHRINE/PF 2%-1:200K
30 VIAL (ML) INJECTION ONCE
Status: COMPLETED | OUTPATIENT
Start: 2017-02-16 | End: 2017-02-16

## 2017-02-16 RX ORDER — HYDROMORPHONE HYDROCHLORIDE 1 MG/ML
0.5 INJECTION, SOLUTION INTRAMUSCULAR; INTRAVENOUS; SUBCUTANEOUS ONCE
Status: DISCONTINUED | OUTPATIENT
Start: 2017-02-16 | End: 2017-03-01 | Stop reason: HOSPADM

## 2017-02-16 RX ADMIN — AMPICILLIN AND SULBACTAM 1.5 G: 1; .5 INJECTION, POWDER, FOR SOLUTION INTRAMUSCULAR; INTRAVENOUS at 05:02

## 2017-02-16 RX ADMIN — IPRATROPIUM BROMIDE 0.5 MG: 0.5 SOLUTION RESPIRATORY (INHALATION) at 02:02

## 2017-02-16 RX ADMIN — MAGNESIUM SULFATE HEPTAHYDRATE 3 G: 500 INJECTION, SOLUTION INTRAMUSCULAR; INTRAVENOUS at 10:02

## 2017-02-16 RX ADMIN — ESCITALOPRAM OXALATE 10 MG: 10 TABLET ORAL at 09:02

## 2017-02-16 RX ADMIN — ASPIRIN 81 MG: 81 TABLET, COATED ORAL at 10:02

## 2017-02-16 RX ADMIN — ARIPIPRAZOLE 5 MG: 5 TABLET ORAL at 09:02

## 2017-02-16 RX ADMIN — METOPROLOL TARTRATE 50 MG: 50 TABLET, FILM COATED ORAL at 10:02

## 2017-02-16 RX ADMIN — IPRATROPIUM BROMIDE 0.5 MG: 0.5 SOLUTION RESPIRATORY (INHALATION) at 03:02

## 2017-02-16 RX ADMIN — MEMANTINE HYDROCHLORIDE 10 MG: 5 TABLET ORAL at 09:02

## 2017-02-16 RX ADMIN — MIRTAZAPINE 30 MG: 30 TABLET, FILM COATED ORAL at 10:02

## 2017-02-16 RX ADMIN — HYDROMORPHONE HYDROCHLORIDE 0.2 MG: 1 INJECTION, SOLUTION INTRAMUSCULAR; INTRAVENOUS; SUBCUTANEOUS at 02:02

## 2017-02-16 RX ADMIN — IPRATROPIUM BROMIDE 0.5 MG: 0.5 SOLUTION RESPIRATORY (INHALATION) at 08:02

## 2017-02-16 RX ADMIN — LIDOCAINE HYDROCHLORIDE,EPINEPHRINE BITARTRATE 30 ML: 20; .005 INJECTION, SOLUTION EPIDURAL; INFILTRATION; INTRACAUDAL; PERINEURAL at 11:02

## 2017-02-16 RX ADMIN — HYDROMORPHONE HYDROCHLORIDE 0.2 MG: 1 INJECTION, SOLUTION INTRAMUSCULAR; INTRAVENOUS; SUBCUTANEOUS at 03:02

## 2017-02-16 RX ADMIN — MEMANTINE HYDROCHLORIDE 10 MG: 5 TABLET ORAL at 10:02

## 2017-02-16 RX ADMIN — PANTOPRAZOLE SODIUM 40 MG: 40 TABLET, DELAYED RELEASE ORAL at 09:02

## 2017-02-16 RX ADMIN — METOPROLOL TARTRATE 25 MG: 25 TABLET ORAL at 09:02

## 2017-02-16 RX ADMIN — AMPICILLIN AND SULBACTAM 1.5 G: 1; .5 INJECTION, POWDER, FOR SOLUTION INTRAMUSCULAR; INTRAVENOUS at 06:02

## 2017-02-16 RX ADMIN — ATORVASTATIN CALCIUM 10 MG: 10 TABLET, FILM COATED ORAL at 09:02

## 2017-02-16 RX ADMIN — APIXABAN 2.5 MG: 2.5 TABLET, FILM COATED ORAL at 10:02

## 2017-02-16 NOTE — PLAN OF CARE
Problem: Physical Therapy Goal  Goal: Physical Therapy Goal  Goals to be met by: 3/1/17     Patient will increase functional independence with mobility by performin. Supine to sit with Contact Guard Assistance  2. Sit to stand transfer with Contact Guard Assistance  3. Gait x 25 feet with Minimal Assistance using Rolling Walker.   4. Lower extremity exercise program x15 reps per handout, with assistance as needed  Outcome: Ongoing (interventions implemented as appropriate)  Pt evaluated and goals appropriate.      Justin Randolph PT, DPT  [phone]

## 2017-02-16 NOTE — PROGRESS NOTES
Pt wants to get out of bed to use the commode when having to urinate. Spoke with Dr. Seaman to find out if pt could bear weight on LLE, MD stated no. Educated pt on why she had to stay in bed and reasons for non-weight bearing status on LLE. Pt is disoriented to place, time, and situation. She forgets that she had surgery and is still trying to get out of bed. Pt is arguing with nurse and being uncooperative. Pt also removing oxygen, causing her SpO2 to drop to the mid 80s. Notified Dr. Seaman of pt's current status, MD stated she would be up to see pt. Will continue to monitor.

## 2017-02-16 NOTE — CONSULTS
"[facility]  Cardiology Consult Note    Attending Physician: Rigo Bernard MD  Reason for Consult: Arterial embolus likely due to atrial fibrillation     HPI:     81 yo woman with PMHx of dementia, HTN, HLD, and history of unclear "arrhythmia" is admitted to the hospital for acute limb ischemia due to left femoral arterial embolus (now s/p embolectomy and fasciotomy 2/15/17). Cardiology is consulted for recommendations on atrial fibrillation as source of embolus and for anticoagulation guidelines.     Patient was brought to the hospital from her nursing home after acute left leg pain. At the [facility], her pulse on LLE wasn't felt; her leg was cold, and she c/o pain. Patient is a poor historian due to baseline dementia, but  at bedside reports she was seen by a cardiologist in [place], [place] (Dr. Smith) for "arrhythmia," possibly atrial fibrillation which she had for several years. However, she doesn't seem to have been anticoagulated previously, and there are no documented records of atrial fibrillation at [facility] [place], [place] (neither telemetry nor EKG currently show episodes of paroxysmal atrial fibrillation).  reports history of TIA 25 years ago, and a smoking history of 10 pack year history over 20 years ago. Otherwise, patient is now a nonsmoker. She lives at [facility] due to her Alzheimer's dementia per her  with cognitive decline beginning 10 years ago. She is somewhat functional at baseline (can bathe, dress and use the restroom by herself) but doesn't remember her medical history.     Today at bedside, she denies any pain including pain in her surgical site at left lower extremity. She also denies any chest pain, shortness of breath, nausea, abdominal pain, orthopnea or dyspnea on exertion. However, she will only answer "no" to pain but does not elucidate on what she is feeling so ROS is somewhat limited due to baseline cognitive decline.     Review of Systems   Review " of Systems   Constitution: Negative for diaphoresis and malaise/fatigue.   Cardiovascular: Negative for chest pain, irregular heartbeat, near-syncope, orthopnea, palpitations and syncope.   Respiratory: Negative for shortness of breath.    Gastrointestinal: Negative for abdominal pain.         PMH:     Past Medical History   Diagnosis Date    Anxiety     Arthritis     Dementia     Former smoker     Hyperlipidemia     Hypertension     Stroke      at age 58     Past Surgical History   Procedure Laterality Date    Hysterectomy       total    Anterior vaginal repair  1960's    Colonoscopy  2008    Upper gastrointestinal endoscopy  2008      h. pylori    Joint replacement       bilateral hip    Colonoscopy N/A 1/5/2016     Procedure: COLONOSCOPY;  Surgeon: Daniel Alicea MD;  Location: [facility];  Service: Endoscopy;  Laterality: N/A;        Allergies:     Review of patient's allergies indicates:  No Known Allergies     Medications:     Current Facility-Administered Medications on File Prior to Encounter   Medication Dose Route Frequency Provider Last Rate Last Dose    [DISCONTINUED] calcium chloride 100 mg/mL (10 %) injection    PRN Marcela Park CRNA   500 mg at 02/14/17 2040    [DISCONTINUED] ceFAZolin injection    PRN Marcela Park CRNA   2 g at 02/14/17 1930    [DISCONTINUED] electrolyte-S (ISOLYTE)    Continuous PRN Marcela Park CRNA        [DISCONTINUED] electrolyte-S (ISOLYTE)    Continuous PRN Marcela Park CRNA        [DISCONTINUED] ephedrine injection    PRN Marcela Park CRNA   5 mg at 02/14/17 2041    [DISCONTINUED] ESMOLOL (BREVIBLOC) BOLUS    PRN Maranda Johnson CRNA   50 mg at 02/14/17 2225    [DISCONTINUED] fentaNYL 50 mcg/mL injection    PRN Marcela Park CRNA   25 mcg at 02/14/17 2112    [DISCONTINUED] glycopyrrolate injection    PRN Maranda Johnson CRNA   0.4 mg at 02/14/17 2225    [DISCONTINUED] heparin (porcine) injection    PRN Marcela Park CRNA   2,000  Units at 02/14/17 2009    [DISCONTINUED] heparin 25,000 units in dextrose 5% 250 mL (100 units/mL) infusion (heparin infusion)    Continuous PRAUBREE Park CRNA   Stopped at 02/14/17 1920    [DISCONTINUED] heparin 25,000 units in dextrose 5% 250 mL (100 units/mL) infusion (heparin infusion)    PRAUBREE Johnson CRNA   700 Units at 02/14/17 2102    [DISCONTINUED] HYDROmorphone injection    PRN Maranda Johnson CRNA   0.6 mg at 02/14/17 2220    [DISCONTINUED] lidocaine (cardiac) injection    PRN Marcela Park CRNA   80 Syringe at 02/14/17 1923    [DISCONTINUED] neostigmine injection    PRAUBREE Johnson CRNA   3 mg at 02/14/17 2225    [DISCONTINUED] phenylephrine injection    PRAUBREE Park CRNA   200 mcg at 02/14/17 2150    [DISCONTINUED] propofol (DIPRIVAN) 10 mg/mL infusion    PRAUBREE Park CRNA   30 mg at 02/14/17 2147    [DISCONTINUED] rocuronium injection    PRAUBREE Park CRNA   20 mg at 02/14/17 2011    [DISCONTINUED] succinylcholine injection    PRAUBREE Park CRNA   100 mg at 02/14/17 1923    [DISCONTINUED] vancomycin (VANCOCIN) 1,000 mg in sodium chloride 0.9% 250 mL IVPB  1,000 mg  Continuous PRAUBREE Park CRNA   1 g at 02/14/17 1952     Current Outpatient Prescriptions on File Prior to Encounter   Medication Sig Dispense Refill    aripiprazole (ABILIFY) 10 MG Tab Take 5 mg by mouth once daily.      atorvastatin (LIPITOR) 10 MG tablet Take 10 mg by mouth once daily.       butalbital-acetaminophen-caffeine -40 mg (FIORICET) -40 mg per tablet Take 1 tablet by mouth every 4 (four) hours as needed for Pain.      donepezil (ARICEPT) 10 MG tablet Take 10 mg by mouth once daily.       escitalopram (LEXAPRO) 10 MG tablet Take 10 mg by mouth once daily.       ginkgo biloba 60 mg Cap Take 1 tablet by mouth once daily.      metoprolol succinate (TOPROL-XL) 100 MG 24 hr tablet Take 100 mg by mouth once daily.       mirabegron (MYRBETRIQ) 25 mg Tb24 ER  tablet Take 1 tablet (25 mg total) by mouth once daily. 30 tablet 11    mirtazapine (REMERON) 30 MG tablet Take 30 mg by mouth every evening.      NAMENDA 10 mg Tab 10 mg 2 (two) times daily.       pantoprazole (PROTONIX) 40 MG tablet Take 1 tablet (40 mg total) by mouth once daily. 30 tablet 6    triamterene-hydrochlorothiazide 37.5-25 mg (DYAZIDE) 37.5-25 mg per capsule Take 1 capsule by mouth every morning.          Social History:     Social History   Substance Use Topics    Smoking status: Former Smoker     Packs/day: 1.00     Years: 25.00     Types: Cigarettes     Quit date: 10/7/1998    Smokeless tobacco: Not on file    Alcohol use No      Comment: occ        Family History:     Family History   Problem Relation Age of Onset    Colon cancer Mother      in late 60s    Colon cancer Father         Physical Exam:     Vitals:  Temp:  [97 °F (36.1 °C)-98.4 °F (36.9 °C)]   Pulse:  []   Resp:  [9-22]   BP: ()/(50-80)   SpO2:  [71 %-97 %]   Arterial Line BP: (100-126)/(51-68)   I/O's:    Intake/Output Summary (Last 24 hours) at 02/15/17 1834  Last data filed at 02/15/17 1735   Gross per 24 hour   Intake          4122.39 ml   Output             1351 ml   Net          2771.39 ml        Physical Exam   Constitutional: No distress.   HENT:   Head: Normocephalic and atraumatic.   Eyes: No scleral icterus.   Neck: Normal range of motion. No JVD present.   Cardiovascular: Normal rate, regular rhythm, normal heart sounds and intact distal pulses.  Exam reveals no gallop and no friction rub.    No murmur heard.  Pulmonary/Chest: Effort normal and breath sounds normal. No respiratory distress. She has no wheezes. She has no rales. She exhibits no tenderness.   Abdominal: Soft. Bowel sounds are normal. She exhibits no distension. There is no tenderness.   Musculoskeletal: She exhibits no edema.   LLE wrapped in bandage, intact LLE dorsalis pedis pulse   Neurological: She is alert.   Oriented to self only  "  Skin: Skin is warm. She is not diaphoretic.         Labs:     Recent Results (from the past 336 hour(s))   CBC auto differential    Collection Time: 02/15/17  4:10 AM   Result Value Ref Range    WBC 9.34 3.90 - 12.70 K/uL    Hemoglobin 10.5 (L) 12.0 - 16.0 g/dL    Hematocrit 31.9 (L) 37.0 - 48.5 %    Platelets 214 150 - 350 K/uL   CBC auto differential    Collection Time: 02/14/17 10:33 PM   Result Value Ref Range    WBC 8.73 3.90 - 12.70 K/uL    Hemoglobin 11.0 (L) 12.0 - 16.0 g/dL    Hematocrit 33.4 (L) 37.0 - 48.5 %    Platelets 206 150 - 350 K/uL   CBC auto differential    Collection Time: 02/14/17  7:01 PM   Result Value Ref Range    WBC 9.65 3.90 - 12.70 K/uL    Hemoglobin 12.3 12.0 - 16.0 g/dL    Hematocrit 36.7 (L) 37.0 - 48.5 %    Platelets 234 150 - 350 K/uL       No results found for this or any previous visit (from the past 336 hour(s)).    No results found for: CHOL, HDL, LDLCALC, TRIG    No results for input(s): TROPONINI, CPKMB, CPK, MB in the last 168 hours.    No results found for: HGBA1C    Estimated Creatinine Clearance: 40.6 mL/min (based on Cr of 1).    Imaging:    Echo:    EKG:   Date:    Telemetry:   Telemetry currently shows:  Acute Events:    Assessment & Recommendations:     83 yo woman with hx of TIA 25 years ago and history of reported atrial fibrillation admitted for acute limb ischemia secondary to arterial embolus from suspected atrial fibrillation. 2D ECHO shows new finding of HFrEF with LVEF 20% and biatrial enlargement, but no apparent LV thrombus.     1. HFrEF   - new diagnosis, recommend starting guide-line based tx for HFrEF including max tolerated doses of beta blockers and ACE-I / ARB   - agree with primary team in starting low dose metoprolol tartrate 25 mg bid; titrate up as tolerated   - recommend initiating low dose ACE-I if BP tolerates: lisinopril 5 mg once daily     2. LE thromboembolism   - reported hx of arrhythmia and "atrial fibrillation" at OSH but no episodes of " atrial fibrillation while admitted here (not on tele or EKG)   - pt previously had seen Dr. Smith in [place], [place] for arrhythmia; will need outside records for documented atrial fibrillation before planning on long term anticoagulation   - if paroxysmal atrial fibrillation at OSH, then will need long term anticoagulation since pt risk elevated (CHADS-Vasc 6 with 18.2% risk of thromboembolic stroke yearly)   - agree with [facility] of choice per primary team (we suggest decreasing apixaban to 5 mg bid or warfarin with heparin bridging for INR 2.0 to 3.0)  - recommend anti-thrombotic tx of revascularized LE with acute arterial occlusion now s/p thrombectomy         Thank you for this consult. Discussed with attending  . Staff recommendations to follow.     Erin Allred MD   PGY1 Internal Medicine   [facility]   Pager [phone]       Attending Addendum:

## 2017-02-16 NOTE — PROGRESS NOTES
Progress Note  Vascular Surgery    Admit Date: 2/14/2017  Post-operative Day: 2 Days Post-Op  Hospital Day: 3    SUBJECTIVE:     Follow-up For:  Procedure(s) (LRB):  THROMBECTOMY (Left)  FASCIOTOMY (Left)    Patient seen and examined. Issues overnight with delirium. Notes appropriate pain control. Episode of Vtach this am (self-limited) when patient was assisted up to bathroom which resolved without intervention- now in paroxysmal afib     OBJECTIVE:     Vital Signs (Most Recent)  Temp: 98.1 °F (36.7 °C) (02/16/17 0806)  Pulse: 94 (02/16/17 0900)  Resp: (!) 9 (02/16/17 0820)  BP: (!) 113/56 (02/16/17 0900)  SpO2: 99 % (02/16/17 0820)    Vital Signs Range (Last 24H):  Temp:  [97.2 °F (36.2 °C)-98.8 °F (37.1 °C)]   Pulse:  [72-94]   Resp:  [9-18]   BP: ()/(50-62)   SpO2:  [71 %-99 %]     I & O (Last 24H):  Intake/Output Summary (Last 24 hours) at 02/16/17 0944  Last data filed at 02/16/17 0408   Gross per 24 hour   Intake          1827.07 ml   Output              651 ml   Net          1176.07 ml     Physical Exam:  General: well developed, well nourished, appears stated age, no distress  Head: normocephalic, atraumatic  Eyes:  PERRL, EOMI  Lungs:  normal respiratory effort  Heart: + Afib currently   Abdomen: soft, non-distended, non-tender to palpation   Extremities: left lower extremity fasciotomies evaluated - muscle is viable, no further bleeding from wound bed appreciated, looks like wound edges are ready to be closed, 2+ left PT, biphasic left DP, no foot drop appreciated    Laboratory:  CBC:   Recent Labs  Lab 02/16/17  0438   WBC 8.22   RBC 3.23*   HGB 9.5*   HCT 29.3*      MCV 91   MCH 29.4   MCHC 32.4     CMP:   Recent Labs  Lab 02/15/17  0410 02/16/17  0438   * 104   CALCIUM 8.3* 8.3*   ALBUMIN 2.6*  --    PROT 5.9*  --     137   K 4.6 4.3   CO2 23 26    102   BUN 20 18   CREATININE 1.0 1.0   ALKPHOS 88  --    ALT 21  --    AST 81*  --    BILITOT 0.7  --      Coagulation:    Recent Labs  Lab 02/15/17  0410   INR 1.0   APTT 25.9       ASSESSMENT/PLAN:     Assessment: 83 yo F with dementia, HTN, paroxysmal atrial fibrillation with left common femoral,popliteal embolus POD 1 left femoral embolectomy of CFA, PFA and SFA with popliteal and tibial embolectomy and LLE fasciotomy.    Plan:   Neuro: Continue pain control with po pain medication   + baseline dementia, oriented to person => home medications resumed  Pulm: Saturating well on 4L NC; Continue pulmonary toilet with neb treatments and incentive spirometry   Cardiovascular: + VTach this am which resolved without intervention; now in Afib   - Cardiology consulted: CARMEN score of 6; Recommended metoprolol 100 mg po bid & addition of Lisinopril => however lisinopril held this am due to low BP    - Echo 2/15/17 noted EF of 20%   GI: cardiac diet as tolerated   : Combs removed; Appropriate UOP  FEN: Replace lytes as needed; No IVFs   Heme/ID:  + Thromboembolic Event => patient on Eliquis - will do 2.5mg BID due to patient's age (>80)    + UTI per UA (+Nitrite & Many bacteria); however, UCx is negative to date => will treat UTI with Unasyn x3 days - will change to Bactrim DS tomorrow for 2 additional days (total of 5 days of treatment)    Wound: Plan to close fasciotomies at the bedside today => Lidocaine ordered   PT/OT to evaluate and treat  CM: DC planning - will hopefully go back to SNF she came from     Marcela Glover DO   Vascular Surgery Fellow  02/16/2017

## 2017-02-16 NOTE — CONSULTS
"[facility]  Cardiology Consult Note    Attending Physician: Rigo Bernard MD  Reason for Consult: Arterial embolus likely due to atrial fibrillation     HPI:     83 yo woman with PMHx of dementia, HTN, HLD, and history of unclear "arrhythmia" is admitted to the hospital for acute limb ischemia due to left femoral arterial embolus (now s/p embolectomy and fasciotomy 2/15/17). Cardiology is consulted for recommendations on atrial fibrillation as source of embolus and for anticoagulation guidelines.     Patient was brought to the hospital from her nursing home after acute left leg pain. At the [facility], her pulse on LLE wasn't felt; her leg was cold, and she c/o pain. Patient is a poor historian due to baseline dementia, but  at bedside reports she was seen by a cardiologist in [place], [place] (Dr. Smith) for "arrhythmia," possibly atrial fibrillation which she had for several years. However, she doesn't seem to have been anticoagulated previously, and there are no documented records of atrial fibrillation at [facility] (neither telemetry nor EKG currently show episodes of paroxysmal atrial fibrillation).  reports history of TIA 25 years ago, and a smoking history of 10 pack year history over 20 years ago. Otherwise, patient is now a nonsmoker. She lives at [facility] due to her Alzheimer's dementia per her  with cognitive decline beginning 10 years ago. She is somewhat functional at baseline (can bathe, dress and use the restroom by herself) but doesn't remember her medical history.     Today at bedside, she denies any pain including pain in her surgical site at left lower extremity. She also denies any chest pain, shortness of breath, nausea, abdominal pain, orthopnea or dyspnea on exertion. However, she will only answer "no" to pain but does not elucidate on what she is feeling so ROS is somewhat limited due to baseline cognitive decline.     Interval History: "     2/16/17: Non sustained, self terminating VT overnight with rate ~170s and non-sustained SVT with aberrancy (with rate related RBBB seen on tele) that also self terminated. Electrolytes not drawn during episode but electrolytes wnl in AM. Patient hemodynamically stable during episode and c/o of no chest pain, SOB, discomfort or pain overnight or in AM. Patient at baseline this morning.       Review of Systems   Review of Systems   Constitution: Negative for diaphoresis and malaise/fatigue.   Cardiovascular: Negative for chest pain, irregular heartbeat, near-syncope, orthopnea, palpitations and syncope.   Respiratory: Negative for shortness of breath.    Gastrointestinal: Negative for abdominal pain.           Physical Exam:     Vitals:  Temp:  [97.2 °F (36.2 °C)-98.8 °F (37.1 °C)]   Pulse:  [74-94]   Resp:  [9-18]   BP: ()/(50-62)   SpO2:  [71 %-99 %]   I/O's:    Intake/Output Summary (Last 24 hours) at 02/16/17 1144  Last data filed at 02/16/17 0408   Gross per 24 hour   Intake          1827.07 ml   Output              651 ml   Net          1176.07 ml        Physical Exam   Constitutional: No distress.   HENT:   Head: Normocephalic and atraumatic.   Eyes: No scleral icterus.   Neck: Normal range of motion. No JVD present.   Cardiovascular: Normal rate, regular rhythm, normal heart sounds and intact distal pulses.  Exam reveals no gallop and no friction rub.    No murmur heard.  Pulmonary/Chest: Effort normal and breath sounds normal. No respiratory distress. She has no wheezes. She has no rales. She exhibits no tenderness.   Abdominal: Soft. Bowel sounds are normal. She exhibits no distension. There is no tenderness.   Musculoskeletal: She exhibits no edema.   LLE wrapped in bandage, intact LLE dorsalis pedis pulse   Neurological: She is alert.   Oriented to self only   Skin: Skin is warm. She is not diaphoretic.         Labs:     Recent Results (from the past 336 hour(s))   CBC auto differential     Collection Time: 02/16/17  4:38 AM   Result Value Ref Range    WBC 8.22 3.90 - 12.70 K/uL    Hemoglobin 9.5 (L) 12.0 - 16.0 g/dL    Hematocrit 29.3 (L) 37.0 - 48.5 %    Platelets 202 150 - 350 K/uL   CBC auto differential    Collection Time: 02/15/17  4:10 AM   Result Value Ref Range    WBC 9.34 3.90 - 12.70 K/uL    Hemoglobin 10.5 (L) 12.0 - 16.0 g/dL    Hematocrit 31.9 (L) 37.0 - 48.5 %    Platelets 214 150 - 350 K/uL   CBC auto differential    Collection Time: 02/14/17 10:33 PM   Result Value Ref Range    WBC 8.73 3.90 - 12.70 K/uL    Hemoglobin 11.0 (L) 12.0 - 16.0 g/dL    Hematocrit 33.4 (L) 37.0 - 48.5 %    Platelets 206 150 - 350 K/uL       Recent Results (from the past 336 hour(s))   Basic metabolic panel    Collection Time: 02/16/17  4:38 AM   Result Value Ref Range    Sodium 137 136 - 145 mmol/L    Potassium 4.3 3.5 - 5.1 mmol/L    Chloride 102 95 - 110 mmol/L    CO2 26 23 - 29 mmol/L    BUN, Bld 18 8 - 23 mg/dL    Creatinine 1.0 0.5 - 1.4 mg/dL    Calcium 8.3 (L) 8.7 - 10.5 mg/dL    Anion Gap 9 8 - 16 mmol/L       No results found for: CHOL, HDL, LDLCALC, TRIG    No results for input(s): TROPONINI, CPKMB, CPK, MB in the last 168 hours.    No results found for: HGBA1C    Estimated Creatinine Clearance: 40.6 mL/min (based on Cr of 1).        Echo 2/15/17:     Left Atrium: The left atrial volume index is moderately enlarged, measuring 47.60 cc/m2.     Left Ventricle: The left ventricle is normal in size, with an end-diastolic diameter of 5.6 cm, and an end-systolic diameter of 4.9 cm. Septal wall thickness is upper limit of normal in size, and posterior wall thickness is mildly increased, with the   septum measuring 1.1 cm and the posterior wall measuring 1.2 cm across. Relative wall thickness was normal at 0.43, and the LV mass index was increased at 181.9 g/m2 consistent with eccentric left ventricular hypertrophy. Global left ventricular systolic   function appears severely depressed. Visually estimated  ejection fraction is 20-25%. The LV Doppler derived stroke volume equals 39.0 ccs. There is global hypokinesis.   CONCLUSIONS     1 - Eccentric hypertrophy.     2 - Severely depressed left ventricular systolic function (EF 20-25%).     3 - Moderate left atrial enlargement.     4 - Moderately depressed right ventricular systolic function .     5 - Mild mitral regurgitation.     6 - Intermediate central venous pressure.     EKG (2/14/17): NSR with sinus arrhythmia    Telemetry:   Telemetry currently shows: NSR   Acute Events: nSVT and nVT     Assessment & Recommendations:     83 yo woman with hx of TIA 25 years ago and history of reported atrial fibrillation admitted for acute limb ischemia secondary to arterial embolus from suspected atrial fibrillation. 2D ECHO shows new finding of HFrEF with LVEF 20% and biatrial enlargement, but no apparent LV thrombus. Episode of wide complex tachycardia c/w true non sustained (<30 sec) ventricular tachycardia self-terminating during hospitalization that necessitates discussion regarding patient centered goals of care.     1. Wide complex tachycardia    - one episode of non-sustained ventricular tachycardia overnight that self terminated    - given her markedly reduced LVEF, this necessitates further discussion with family regarding her long term goals of care; if family and medical POA seek aggressive management, she will qualify for in-patient life vest with eventual ICD placement; further course of action based on goals of care   - will also need to maximize medical management: increase metoprolol tartrate to 50 mg bid    - continue to replete electrolytes (K+ at 4, Mg 2 and Phos 4) as needed   - another episode of wide complex tachycardia c/w non-sustained SVT with aberrancy (rate related RBBB with wide complex tachy seen on telemetry that suggests SVT)       2. HFrEF   - new diagnosis, recommend starting guide-line based tx for HFrEF including max tolerated doses of beta  "blockers and ACE-I / ARB   - increase metoprolol tartrate to 50 mg bid, with goal of eventual max tolerated BB dose    - continue lisinopril 2.5 mg once daily with goal of eventual max tolerated dose      3. Arrhythmia    - reported hx of arrhythmia and "atrial fibrillation" at OSH but no episodes of atrial fibrillation while admitted here (not on tele or EKG since admission)   - pt previously had seen Dr. Smith in [place], [place] for arrhythmia; will need outside records for documented atrial fibrillation before planning on long term anticoagulation   - if paroxysmal atrial fibrillation at OSH, then will need long term anticoagulation since pt risk elevated (CHADS-Vasc 6 with 18.2% risk of thromboembolic stroke yearly)   - overnight episode of nSVT   - apixaban 2.5 mg bid or warfarin with heparin bridging for INR 2.0 to 2.5 given risk of bleeding in this pt with high fall risk       Dispo: will need out-pt cardiology f/u in 2 weeks after discharge     Thank you for this consult. Will continue to check telemetry while pt admitted.   Discussed with attending Dr. Ramos. Staff recommendations to follow.     Erin Allred MD   PGY1 Internal Medicine   [facility]   Pager [phone]       Attending Addendum:         "

## 2017-02-16 NOTE — PLAN OF CARE
Problem: Patient Care Overview  Goal: Plan of Care Review  Outcome: Ongoing (interventions implemented as appropriate)  Pt awake and alert, oriented to self only. See previous notes. VSS, afebrile. Pt denies pain. NV checks performed q4h with no change from baseline. Dressing to LLE changed by MD. Pt using bedpan. Bedrest maintained for shift. Family at bedside. Pt is free from falls/injury/trauma. Call bell within reach. Will continue to monitor.

## 2017-02-16 NOTE — PT/OT/SLP EVAL
Physical Therapy  Evaluation and treatment    Boubacar Chen   MRN: [id number]   Admitting Diagnosis: Lower limb ischemia    PT Received On: 17  PT Start Time: 1440     PT Stop Time: 1520    PT Total Time (min): 40 min       Billable Minutes:  Evaluation 20 and Therapeutic Activity 20    Diagnosis: Lower limb ischemia  S/p LLE fasciotomy     Past Medical History   Diagnosis Date    Anxiety     Arthritis     Dementia     Former smoker     Hyperlipidemia     Hypertension     Stroke      at age 58      Past Surgical History   Procedure Laterality Date    Hysterectomy       total    Anterior vaginal repair      Colonoscopy      Upper gastrointestinal endoscopy        h. pylori    Joint replacement       bilateral hip    Colonoscopy N/A 2016     Procedure: COLONOSCOPY;  Surgeon: Daniel Alicea MD;  Location: [facility];  Service: Endoscopy;  Laterality: N/A;       Referring physician: Dr. Metzger  Date referred to PT: 2/15/17    General Precautions: Standard,    Orthopedic Precautions: LLE non weight bearing   Braces: N/A       Do you have any cultural, spiritual, Temple conflicts, given your current situation?: none     Patient History:  Lives With: facility resident  Living Arrangements: assisted living  Transportation Available: car  Living Environment Comment: Per spouse, pt was Independent with ambulation and all ADLs PTA. Pt lived in [facility] living PTA.   Equipment Currently Used at Home: none  DME owned (not currently used): none    Previous Level of Function:  Ambulation Skills: independent  Transfer Skills: independent  ADL Skills: independent  Work/Leisure Activity: independent  Pt spouse present and reported PLOF for patient.   Subjective:  Communicated with RN prior to session.  Pt confused and required constant reinforcement. Pt agreeable to participating with therapy.   Chief Complaint: none stated   Patient goals: return to NH    Pain Ratin/10    Pain Rating Post-Intervention: 0/10    Objective:   Patient found with: pulse ox (continuous), telemetry, oxygen     Cognitive Exam:  Oriented to: Person    Follows Commands/attention: Inattentive, Easily distracted and Follows one-step commands  Communication: clear/fluent  Safety awareness/insight to disability: impaired    Physical Exam:  Postural examination/scapula alignment: Rounded shoulder, Head forward, Abnormal trunk flexion and Kyphosis    Skin integrity: Visible skin intact  Edema: None noted     Sensation:   Intact    Upper Extremity Range of Motion:  Right Upper Extremity: WFL  Left Upper Extremity: WFL    Upper Extremity Strength:  Right Upper Extremity: WFL  Left Upper Extremity: WFL    Lower Extremity Range of Motion:  Right Lower Extremity: WFL  Left Lower Extremity: WFL    Lower Extremity Strength:  Right Lower Extremity: Deficits: 3/5  Left Lower Extremity: Deficits: 3/5    Gross motor coordination: impaired 2/2 to weakness     Functional Mobility:  Bed Mobility:  Rolling/Turning to Left: Maximum assistance  Rolling/Turning Right: Maximum assistance  Scooting/Bridging: Maximum Assistance  Supine to Sit: Maximum Assistance  Sit to Supine: Maximum Assistance    Transfers:  Sit <> Stand Assistance: Total Assistance (max a x 2)  Sit <> Stand Assistive Device: Rolling Walker    Gait:   Gait Distance: Unable to perform    Balance:   Static Sit: FAIR-: Maintains without assist but inconsistent   Dynamic Sit: FAIR: Cannot move trunk without losing balance  Static Stand: POOR: Needs MODERATE assist to maintain  Dynamic stand: 0: N/A    Therapeutic Activities and Exercises:  Patient and spouse educated on role of PT and safety with mobility. Patients spouse verbalized and demonstrated understanding of safety precautions with mobility. Pt sat EOB x 10 min with mod A initially and progressed to SBA once positioned to midline and BUE positioned to sides. Pt educated on NWB precautions on LLE. Pt will  require constant reinforcement for adherence to WB precautions 2/2 to increased confusion. Pt performed sit>stand transfer from EOB with RW and max a x 2 for support x 4 trials. Pt required tc for flexing LLE with stance to avoid WB on LLE. Pt able to perform static stance x 45 seconds with max a x 2 for stability. Pt urinated while in static stance, RN notified post session. PT performed pericare via bed rolling L<>R.   AM-PAC 6 CLICK MOBILITY  How much help from another person does this patient currently need?   1 = Unable, Total/Dependent Assistance  2 = A lot, Maximum/Moderate Assistance  3 = A little, Minimum/Contact Guard/Supervision  4 = None, Modified [place]/Independent    Turning over in bed (including adjusting bedclothes, sheets and blankets)?: 2  Sitting down on and standing up from a chair with arms (e.g., wheelchair, bedside commode, etc.): 2  Moving from lying on back to sitting on the side of the bed?: 2  Moving to and from a bed to a chair (including a wheelchair)?: 1  Need to walk in hospital room?: 1  Climbing 3-5 steps with a railing?: 1  Total Score: 9     AM-PAC Raw Score CMS G-Code Modifier Level of Impairment Assistance   6 % Total / Unable   7 - 9 CM 80 - 100% Maximal Assist   10 - 14 CL 60 - 80% Moderate Assist   15 - 19 CK 40 - 60% Moderate Assist   20 - 22 CJ 20 - 40% Minimal Assist   23 CI 1-20% SBA / CGA   24 CH 0% Independent/ Mod I     Patient left supine with all lines intact, call button in reach and RN notified.    Assessment:   Boubacar Chen is a 82 y.o. female with a medical diagnosis of Lower limb ischemia s/p LLE fasciotomy and presents with decreased functional mobility due to impaired endurance and weakness throughout.  Patient would benefit from skilled PT in the acute care setting to improve the limitations listed below. Patient would benefit from SNF in NH upon discharge.      Rehab identified problem list/impairments: Rehab identified problem  list/impairments: weakness, impaired endurance, impaired self care skills, impaired functional mobilty, impaired balance, gait instability, pain    Rehab potential is good.    Activity tolerance: Poor    Discharge recommendations: Discharge Facility/Level Of Care Needs: nursing facility, skilled (SNF in NH, return to [facility])     Barriers to discharge: Barriers to Discharge: None    Equipment recommendations: Equipment Needed After Discharge: bedside commode, walker, rolling     GOALS:   Physical Therapy Goals        Problem: Physical Therapy Goal    Goal Priority Disciplines Outcome Goal Variances Interventions   Physical Therapy Goal     PT/OT, PT Ongoing (interventions implemented as appropriate)     Description:  Goals to be met by: 3/1/17     Patient will increase functional independence with mobility by performin. Supine to sit with Contact Guard Assistance  2. Sit to stand transfer with Contact Guard Assistance  3. Gait  x 25 feet with Minimal Assistance using Rolling Walker.   4. Lower extremity exercise program x15 reps per handout, with assistance as needed                PLAN:    Patient to be seen 5 x/week to address the above listed problems via gait training, therapeutic activities, therapeutic exercises  Plan of Care expires: 17  Plan of Care reviewed with: patient, spouse          Justin Randolph, PT  2017

## 2017-02-16 NOTE — PLAN OF CARE
Sw awaiting PT/OT recs to inform d/c plan. Sw informed by MD that Pt was living at a NH prior to admit. Pt d/c may be a return to NH, as a SNF Pt, if appropriate. Sw will continue to follow.      Mame Johnston, JOHN [id number]

## 2017-02-16 NOTE — PROGRESS NOTES
Attempted to get pt out of bed to commode with assist x2. Pt could not hold herself in sitting position, and while trying to get out of bed had a 15 beat VTACH. Notified Dr. Glover. Pt now using bedpan. Bed alarm on. Will continue to monitor.

## 2017-02-17 LAB
ANION GAP SERPL CALC-SCNC: 8 MMOL/L
BASOPHILS # BLD AUTO: 0.02 K/UL
BASOPHILS NFR BLD: 0.2 %
BUN SERPL-MCNC: 15 MG/DL
CALCIUM SERPL-MCNC: 8.6 MG/DL
CHLORIDE SERPL-SCNC: 101 MMOL/L
CO2 SERPL-SCNC: 27 MMOL/L
CREAT SERPL-MCNC: 1 MG/DL
DIFFERENTIAL METHOD: ABNORMAL
EOSINOPHIL # BLD AUTO: 0.3 K/UL
EOSINOPHIL NFR BLD: 3.5 %
ERYTHROCYTE [DISTWIDTH] IN BLOOD BY AUTOMATED COUNT: 13.6 %
EST. GFR  (AFRICAN AMERICAN): >60 ML/MIN/1.73 M^2
EST. GFR  (NON AFRICAN AMERICAN): 52.6 ML/MIN/1.73 M^2
GLUCOSE SERPL-MCNC: 117 MG/DL
HCT VFR BLD AUTO: 29.9 %
HGB BLD-MCNC: 9.7 G/DL
LYMPHOCYTES # BLD AUTO: 1.1 K/UL
LYMPHOCYTES NFR BLD: 13.1 %
MAGNESIUM SERPL-MCNC: 1.8 MG/DL
MCH RBC QN AUTO: 30.1 PG
MCHC RBC AUTO-ENTMCNC: 32.4 %
MCV RBC AUTO: 93 FL
MONOCYTES # BLD AUTO: 0.7 K/UL
MONOCYTES NFR BLD: 8 %
NEUTROPHILS # BLD AUTO: 6.1 K/UL
NEUTROPHILS NFR BLD: 74.7 %
PHOSPHATE SERPL-MCNC: 3.3 MG/DL
PLATELET # BLD AUTO: 211 K/UL
PMV BLD AUTO: 9.6 FL
POTASSIUM SERPL-SCNC: 4.3 MMOL/L
RBC # BLD AUTO: 3.22 M/UL
SODIUM SERPL-SCNC: 136 MMOL/L
WBC # BLD AUTO: 8.17 K/UL

## 2017-02-17 PROCEDURE — 20600001 HC STEP DOWN PRIVATE ROOM

## 2017-02-17 PROCEDURE — 94640 AIRWAY INHALATION TREATMENT: CPT

## 2017-02-17 PROCEDURE — 83735 ASSAY OF MAGNESIUM: CPT

## 2017-02-17 PROCEDURE — 85025 COMPLETE CBC W/AUTO DIFF WBC: CPT

## 2017-02-17 PROCEDURE — 36415 COLL VENOUS BLD VENIPUNCTURE: CPT

## 2017-02-17 PROCEDURE — 97530 THERAPEUTIC ACTIVITIES: CPT

## 2017-02-17 PROCEDURE — 63600175 PHARM REV CODE 636 W HCPCS: Performed by: SURGERY

## 2017-02-17 PROCEDURE — 84100 ASSAY OF PHOSPHORUS: CPT

## 2017-02-17 PROCEDURE — 80048 BASIC METABOLIC PNL TOTAL CA: CPT

## 2017-02-17 PROCEDURE — 25000003 PHARM REV CODE 250: Performed by: SURGERY

## 2017-02-17 PROCEDURE — 86580 TB INTRADERMAL TEST: CPT | Performed by: SURGERY

## 2017-02-17 PROCEDURE — 27000221 HC OXYGEN, UP TO 24 HOURS

## 2017-02-17 PROCEDURE — 97166 OT EVAL MOD COMPLEX 45 MIN: CPT

## 2017-02-17 RX ORDER — ASPIRIN 81 MG/1
81 TABLET ORAL DAILY
Refills: 0 | COMMUNITY
Start: 2017-02-17 | End: 2017-11-07 | Stop reason: SDUPTHER

## 2017-02-17 RX ORDER — MAGNESIUM SULFATE HEPTAHYDRATE 40 MG/ML
2 INJECTION, SOLUTION INTRAVENOUS ONCE
Status: COMPLETED | OUTPATIENT
Start: 2017-02-17 | End: 2017-02-17

## 2017-02-17 RX ORDER — LISINOPRIL 2.5 MG/1
2.5 TABLET ORAL DAILY
Qty: 90 TABLET | Refills: 3 | Status: SHIPPED | OUTPATIENT
Start: 2017-02-17 | End: 2017-11-07

## 2017-02-17 RX ORDER — HYDROCODONE BITARTRATE AND ACETAMINOPHEN 5; 325 MG/1; MG/1
1 TABLET ORAL EVERY 4 HOURS PRN
Qty: 41 TABLET | Refills: 0 | Status: SHIPPED | OUTPATIENT
Start: 2017-02-17 | End: 2017-03-22 | Stop reason: SDUPTHER

## 2017-02-17 RX ADMIN — IPRATROPIUM BROMIDE 0.5 MG: 0.5 SOLUTION RESPIRATORY (INHALATION) at 07:02

## 2017-02-17 RX ADMIN — MEMANTINE HYDROCHLORIDE 10 MG: 5 TABLET ORAL at 10:02

## 2017-02-17 RX ADMIN — ARIPIPRAZOLE 5 MG: 5 TABLET ORAL at 09:02

## 2017-02-17 RX ADMIN — LISINOPRIL 2.5 MG: 2.5 TABLET ORAL at 09:02

## 2017-02-17 RX ADMIN — AMPICILLIN AND SULBACTAM 1.5 G: 1; .5 INJECTION, POWDER, FOR SOLUTION INTRAMUSCULAR; INTRAVENOUS at 07:02

## 2017-02-17 RX ADMIN — AMPICILLIN AND SULBACTAM 1.5 G: 1; .5 INJECTION, POWDER, FOR SOLUTION INTRAMUSCULAR; INTRAVENOUS at 06:02

## 2017-02-17 RX ADMIN — METOPROLOL TARTRATE 50 MG: 50 TABLET, FILM COATED ORAL at 09:02

## 2017-02-17 RX ADMIN — PANTOPRAZOLE SODIUM 40 MG: 40 TABLET, DELAYED RELEASE ORAL at 09:02

## 2017-02-17 RX ADMIN — MEMANTINE HYDROCHLORIDE 10 MG: 5 TABLET ORAL at 09:02

## 2017-02-17 RX ADMIN — MAGNESIUM SULFATE IN WATER 2 G: 40 INJECTION, SOLUTION INTRAVENOUS at 07:02

## 2017-02-17 RX ADMIN — ESCITALOPRAM OXALATE 10 MG: 10 TABLET ORAL at 09:02

## 2017-02-17 RX ADMIN — IPRATROPIUM BROMIDE 0.5 MG: 0.5 SOLUTION RESPIRATORY (INHALATION) at 04:02

## 2017-02-17 RX ADMIN — APIXABAN 2.5 MG: 2.5 TABLET, FILM COATED ORAL at 10:02

## 2017-02-17 RX ADMIN — ATORVASTATIN CALCIUM 10 MG: 10 TABLET, FILM COATED ORAL at 09:02

## 2017-02-17 RX ADMIN — Medication 5 UNITS: at 01:02

## 2017-02-17 RX ADMIN — MIRTAZAPINE 30 MG: 30 TABLET, FILM COATED ORAL at 10:02

## 2017-02-17 RX ADMIN — APIXABAN 2.5 MG: 2.5 TABLET, FILM COATED ORAL at 09:02

## 2017-02-17 RX ADMIN — ASPIRIN 81 MG: 81 TABLET, COATED ORAL at 09:02

## 2017-02-17 NOTE — PLAN OF CARE
Flora spoke to Jarad at [facility]. Jarad to assess Pt in person today. Pt declined by [facility]. Pt referral currently under review by [facility]. Flora will continue to follow.      UPDATE 4:18 PM   Flora received call from Jarad at [facility]. Jarad stated he visited Pt and Pt's  by the bedside. Pt's  not at all open to Pt placement. Pt's  very short with Jarad. Pt's  refused to have conversation with Jarad, abruptly left room. Flora informed Dr. Florentino of above. This situation likely occurred due to VAS Team's original impression that Pt resided in a NH, when she lives in an Assisted Living Home. Flora requested that the medical team discuss with Pt and family the reasons underlying the SNF recommendation. Flora will continue to follow.      Mame Johnston, JOHN [id number]

## 2017-02-17 NOTE — PT/OT/SLP EVAL
Occupational Therapy  Evaluation + Treatment    Boubacar Chen   MRN: [id number]   Admitting Diagnosis: Lower limb ischemia    OT Date of Treatment: 02/17/17   OT Start Time: 1318  OT Stop Time: 1347  OT Total Time (min): 29 min    Billable Minutes:  Evaluation 18  Therapeutic Activity 10    Diagnosis: Lower limb ischemia   S/p LLE Fasciotomy     Past Medical History   Diagnosis Date    Anxiety     Arthritis     Dementia     Former smoker     Hyperlipidemia     Hypertension     Stroke      at age 58      Past Surgical History   Procedure Laterality Date    Hysterectomy       total    Anterior vaginal repair  1960's    Colonoscopy  2008    Upper gastrointestinal endoscopy  2008      h. pylori    Joint replacement       bilateral hip    Colonoscopy N/A 1/5/2016     Procedure: COLONOSCOPY;  Surgeon: Daniel Alicea MD;  Location: [facility];  Service: Endoscopy;  Laterality: N/A;       Referring physician: MD Chadd  Date referred to OT: 2/15/2017    General Precautions: Standard, fall (cardiac diet)  Orthopedic Precautions: LLE non weight bearing  Braces: N/A    Patient History:  Living Environment  Living Environment Comment: Per spouse, pt lives at [facility] in apartment style living. Prior to admit, she was active and indep with functional mobility, ADLs, self care and bathing/showering. Pt is able to feed herself with set up. She attends activities during the week. She goes on outting in the community. Pt was placed in facility ~10 years ago 2/2 progressed Dementia (per spouse: Needing assistance for safety and med mgmt)  Equipment Currently Used at Home: none    Prior level of function:   Bed Mobility/Transfers: independent  Grooming: independent  Bathing: independent  Upper Body Dressing: independent  Lower Body Dressing: independent  Toileting: independent  Home Management Skills: independent  Dominant hand: right    Subjective:  Communicated with RN prior to  "session.  Pt's spouse present throughout (looking at telephone, uninvolved)  Chief Complaint: none reported   Patient/Family stated goals: none reported    Pain Rating:  ("Little")  Location - Side: Left  Location - Orientation: lower  Location: leg  Pain Addressed: Pre-medicate for activity, Reposition  Pain Rating Post-Intervention: 0/10    Objective:  Patient found with: telemetry, peripheral IV, oxygen, blood pressure cuff    Cognitive Exam:  Oriented to: Person  Follows Commands/attention: Inattentive and Follows multistep  Commands inconsistently   Communication: clear/fluent  Memory:  Impaired short term memory; Dementia  Safety awareness/insight to disability: impaired  Coping skills/emotional control: Appropriate to situation    Visual/perceptual:  Intact    Physical Exam:  Postural examination/scapula alignment: Rounded shoulder  Skin integrity: Wound L LE  Edema: Moderate L LE    Sensation:   Intact  light/touch B UE    Upper Extremity Range of Motion:  Right Upper Extremity: WFL  Left Upper Extremity: WFL    Upper Extremity Strength:  Right Upper Extremity: 4+/5  Left Upper Extremity: 4+/5   Strength: 4+/5 B    Fine motor coordination:   Intact  Left hand, finger to nose, Right hand, finger to nose, Left hand thumb/finger opposition skills, Right hand thumb/finger opposition skills, Left hand, manipulation of objects and Right hand, manipulation of objects    Gross motor coordination: WFL    Functional Mobility:  Bed Mobility:  Rolling/Turning to Left: Total assistance  Rolling/Turning Right: Total assistance  Scooting/Bridging: Total Assistance    Transfers: Did not complete 2/2 fatigue     Activities of Daily Living:  Feeding Level of Assistance: Minimum assistance, Set-up Assistance (assist with bilateral coordination task for cutting fish)  UE Dressing Level of Assistance: Minimum assistance (in supported sitting)  LE Dressing Level of Assistance: Total assistance (unable)    Therapeutic " "Exercises:  -in supported sitting:    -B UE chest press and shoulder press x10 reps   -Scapular protraction/retraction x10 reps   -long sitting x10 reps (core)   -P ROM for hip Abd/add and IR/ER    Additional:  -Pt and spouse edu on OT role in care and POC for acute stay  -Pt and spouse edu on completion of Incentive Inspirometer ; completed x10 trials  -Communication board updated     AM-PAC 6 CLICK ADL  How much help from another person does this patient currently need?  1 = Unable, Total/Dependent Assistance  2 = A lot, Maximum/Moderate Assistance  3 = A little, Minimum/Contact Guard/Supervision  4 = None, Modified [place]/Independent    Putting on and taking off regular lower body clothing? : 1  Bathing (including washing, rinsing, drying)?: 1  Toileting, which includes using toilet, bedpan, or urinal? : 1  Putting on and taking off regular upper body clothing?: 3  Taking care of personal grooming such as brushing teeth?: 3  Eating meals?: 3  Total Score: 12    AM-PAC Raw Score CMS "G-Code Modifier Level of Impairment Assistance   6 % Total / Unable   7 - 9 CM 80 - 100% Maximal Assist   10 - 14 CL 60 - 80% Moderate Assist   15 - 19 CK 40 - 60% Moderate Assist   20 - 22 CJ 20 - 40% Minimal Assist   23 CI 1-20% SBA / CGA   24 CH 0% Independent/ Mod I       Patient left with bed in chair position (eating)with all lines intact, call button in reach, RN notified and spouse present    Assessment:  Boubacar Chen is a 82 y.o. female with a medical diagnosis of Lower limb ischemia and presents with s/p LLE fasciotomy. Pt with baseline Dementia. She presents with pain limiting functional mobility and NWB status (with impaired cognition to keep) which makes her a fall risk. She demo overall decline in endurance and delay with initation of task. She would benefit from skilled OT services to address the following areas of concern listed below.    Rehab identified problem list/impairments: Rehab identified " problem list/impairments: weakness, impaired endurance, impaired sensation, impaired self care skills, impaired functional mobilty, gait instability, impaired balance, orthopedic precautions, decreased lower extremity function, impaired cognition, decreased safety awareness, pain, impaired skin, edema, impaired joint extensibility    Rehab potential is good.    Activity tolerance: Good    Discharge recommendations: Discharge Facility/Level Of Care Needs: nursing facility, skilled     Barriers to discharge: Barriers to Discharge: None    Equipment recommendations: bedside commode, walker, rolling, wheelchair     GOALS:   Occupational Therapy Goals        Problem: Occupational Therapy Goal    Goal Priority Disciplines Outcome Interventions   Occupational Therapy Goal     OT, PT/OT Ongoing (interventions implemented as appropriate)    Description:  Goals to be met by: 2/24     Patient will increase functional independence with ADLs by performing:    CG/Pt demo understanding for positioning.   UE Dressing while seated with Set-up Assistance and Stand-by Assistance.  Grooming while seated with Set-up Assistance and Stand-by Assistance.  Sitting at edge of bed x10 minutes for dynamic functional task with Contact Guard Assistance.  Rolling to Bilateral with Minimal Assistance and use of bedrail as needed.   Stand pivot transfers with Maximum Assistance and maintaining weight-bearing precaution(s).  Toilet transfer to bedside commode with Maximum Assistance and maintaining weight-bearing precaution(s).  Upper extremity exercise program x10 reps per handout, with assistance as needed.                PLAN:  Patient to be seen 5 x/week to address the above listed problems via self-care/home management, therapeutic activities, neuromuscular re-education, therapeutic exercises  Plan of Care expires: 03/19/17  Plan of Care reviewed with: patient, spouse    MILANA Souza  02/17/2017

## 2017-02-17 NOTE — PLAN OF CARE
[facility]     Department of Hospital Medicine     [address] [place] [address]     [phone] after hours  [phone] fax       NURSING HOME ORDERS    02/17/2017    Admit to Nursing Home:    Skilled Bed                                            Diagnoses:  Active Hospital Problems    Diagnosis  POA    *Lower limb ischemia [I99.8]  Yes    Critical lower limb ischemia [I99.8]  Yes    Lower urinary tract infectious disease [N39.0]  Yes      Resolved Hospital Problems    Diagnosis Date Resolved POA   No resolved problems to display.       Patient is homebound due to:  Lower limb ischemia    Allergies:Review of patient's allergies indicates:  No Known Allergies    Vitals:       Every shift (Skilled Nursing patients)    Diet: Cardiac     Acitivities:     - Up in a chair each morning as tolerated   - Ambulate with assistance to bathroom   - Scheduled walks once each shift (every 8 hours)    LABS:  Per facility protocol   CMP, CBC each month for 3 months     Nursing Precautions:     - Aspiration precautions:             - Total assistance with meals            -  Upright 90 degrees befor during and after meals             -  Suction at bedside          - Fall precautions per nursing home protocol   - Decubitus precautions:        -  for positioning   - Pressure reducing foam mattress   - Turn patient every two hours. Use wedge pillows to anchor patient    CONSULTS:     Physical Therapy to evaluate and treat     Occupational Therapy to evaluate and treat     Nutrition to evaluate and recommend diet     Psychiatry to evaluate and follow patients for delirium    MISCELLANEOUS CARE:      Routine Skin for Bedridden Patients:  Apply moisture barrier cream to all    skin folds and wet areas in perineal area daily and after baths and                           all bowel movements.    WOUND CARE:  Wound spray or saline for wound cleaning with all dressing changes.     All wounds to be measured with first dressing changes and every week.      Other Wounds:   Surgical                    Location:  Left medial calf        Apply the following to wound:            -  Other: dry gauze daily    Wound Vac:     Location: left lateral calf        Dressing changes every Monday, Wednesday and Friday.        ET Consult      Heart Failure Home Health Instructions:     SN to instruct on the following:    Instruct on the definition of CHF.   Instruct on the signs/sympoms of CHF to be reported.   Instruct on and monitor daily weights.   Instruct on factors that cause exacerbation.   Instruct on action, dose, schedule, and side effects of medications.   Instruct on diet as prescribed.   Instruct on activity allowed.   Instruct on life-style modifications for life long management of CHF   SN to assess compliance with daily weights, diet, medications, fluid retention,    safety precautions, activities permitted and life-style modifications.   Additional 1-2 SN visits per week as needed for signs and symptoms     of CHF exacerbation.                 Medications: Discontinue all previous medication orders, if any. See new list below.     Boubacar Chen   Home Medication Instructions DELLA:[id number]    Printed on:02/17/17 1112   Medication Information                      aripiprazole (ABILIFY) 10 MG Tab  Take 5 mg by mouth once daily.             atorvastatin (LIPITOR) 10 MG tablet  Take 10 mg by mouth once daily.              butalbital-acetaminophen-caffeine -40 mg (FIORICET) -40 mg per tablet  Take 1 tablet by mouth every 4 (four) hours as needed for Pain.             donepezil (ARICEPT) 10 MG tablet  Take 10 mg by mouth once daily.              escitalopram (LEXAPRO) 10 MG tablet  Take 10 mg by mouth once daily.              ginkgo biloba 60 mg Cap  Take 1 tablet by mouth once daily.             metoprolol succinate (TOPROL-XL) 100 MG 24 hr tablet  Take 100 mg by mouth once  daily.              mirabegron (MYRBETRIQ) 25 mg Tb24 ER tablet  Take 1 tablet (25 mg total) by mouth once daily.             mirtazapine (REMERON) 30 MG tablet  Take 30 mg by mouth every evening.             NAMENDA 10 mg Tab  10 mg 2 (two) times daily.              pantoprazole (PROTONIX) 40 MG tablet  Take 1 tablet (40 mg total) by mouth once daily.             triamterene-hydrochlorothiazide 37.5-25 mg (DYAZIDE) 37.5-25 mg per capsule  Take 1 capsule by mouth every morning.                       _________________________________  Jahaira Florentino MD  02/17/2017

## 2017-02-17 NOTE — PLAN OF CARE
· [facility] connected per [facility] 2/17/2017 12:13:15 PM Under Review: Onsite Review  Violeta [email or web address]@PAC  [place] connected     Tj Stephenson [phone] per Eileen received referral Jarad is working on it

## 2017-02-17 NOTE — PLAN OF CARE
Problem: Occupational Therapy Goal  Goal: Occupational Therapy Goal  Goals to be met by: 2/24     Patient will increase functional independence with ADLs by performing:    CG/Pt demo understanding for positioning.   UE Dressing while seated with Set-up Assistance and Stand-by Assistance.  Grooming while seated with Set-up Assistance and Stand-by Assistance.  Sitting at edge of bed x10 minutes for dynamic functional task with Contact Guard Assistance.  Rolling to Bilateral with Minimal Assistance and use of bedrail as needed.   Stand pivot transfers with Maximum Assistance and maintaining weight-bearing precaution(s).  Toilet transfer to bedside commode with Maximum Assistance and maintaining weight-bearing precaution(s).  Upper extremity exercise program x10 reps per handout, with assistance as needed.  Outcome: Ongoing (interventions implemented as appropriate)  OT eval completed. Rec SNF at this time. Would benefit from  and bedside commode for d/c planning at this time. MILANA Souza 2/17/2017

## 2017-02-17 NOTE — PLAN OF CARE
PT recommending SNF. Flora informed that Pt was a current resident at . Flora spoke to Dr. Florentino, who stated Pt will need a w/vac upon d/c as well. Flora requested PPD, Chest Xray, and completed w/vac request forms. MD to inform Sw when w/vac forms have been completed. Fl[facility] ([facility]). Flora contacted [facility] [phone], requested to speak to admissions. Gisell not available at time of call. Flora contacted Gisell by cell phone [phone]. Gisell stated there is no resident at [facility] by the name of Boubacar Chen. Flora called Pt room to clarify situation.  No answer at time of call. Flora called Pt's daughter with number on file. Pt's daughter clarified that Pt is not a resident at a Nursing Home, she resides at [facility]. Flora contacted MD, informed her of above information. Flora requested that MD complete PASRR. Sw to email PASRR to MD.     Flora spoke to Pt's daughter regarding SNF preference. Pt's daughter would like top 3 SNFs to be the closest 3 SNFs to her home in [place]. Flora requested that [facility] send referrals to 1. Erika Alicea 2. [place] 3. [facility]. Flora will continue to follow.      Mame Johnston, SW [id number]

## 2017-02-18 LAB
ANION GAP SERPL CALC-SCNC: 11 MMOL/L
BASOPHILS # BLD AUTO: 0.02 K/UL
BASOPHILS NFR BLD: 0.3 %
BLD PROD TYP BPU: NORMAL
BLOOD UNIT EXPIRATION DATE: NORMAL
BLOOD UNIT TYPE CODE: 5100
BLOOD UNIT TYPE: NORMAL
BUN SERPL-MCNC: 17 MG/DL
CALCIUM SERPL-MCNC: 8.4 MG/DL
CHLORIDE SERPL-SCNC: 103 MMOL/L
CO2 SERPL-SCNC: 25 MMOL/L
CODING SYSTEM: NORMAL
CREAT SERPL-MCNC: 0.9 MG/DL
DIFFERENTIAL METHOD: ABNORMAL
DISPENSE STATUS: NORMAL
EOSINOPHIL # BLD AUTO: 0.3 K/UL
EOSINOPHIL NFR BLD: 4.8 %
ERYTHROCYTE [DISTWIDTH] IN BLOOD BY AUTOMATED COUNT: 13.4 %
EST. GFR  (AFRICAN AMERICAN): >60 ML/MIN/1.73 M^2
EST. GFR  (NON AFRICAN AMERICAN): 59.7 ML/MIN/1.73 M^2
GLUCOSE SERPL-MCNC: 93 MG/DL
HCT VFR BLD AUTO: 28.9 %
HGB BLD-MCNC: 9.4 G/DL
LYMPHOCYTES # BLD AUTO: 1.1 K/UL
LYMPHOCYTES NFR BLD: 16.3 %
MAGNESIUM SERPL-MCNC: 1.6 MG/DL
MCH RBC QN AUTO: 29.4 PG
MCHC RBC AUTO-ENTMCNC: 32.5 %
MCV RBC AUTO: 90 FL
MONOCYTES # BLD AUTO: 0.6 K/UL
MONOCYTES NFR BLD: 8.7 %
NEUTROPHILS # BLD AUTO: 4.7 K/UL
NEUTROPHILS NFR BLD: 69 %
PHOSPHATE SERPL-MCNC: 3.4 MG/DL
PLATELET # BLD AUTO: 266 K/UL
PMV BLD AUTO: 9.3 FL
POTASSIUM SERPL-SCNC: 3.9 MMOL/L
RBC # BLD AUTO: 3.2 M/UL
SODIUM SERPL-SCNC: 139 MMOL/L
TRANS ERYTHROCYTES VOL PATIENT: NORMAL ML
WBC # BLD AUTO: 6.87 K/UL

## 2017-02-18 PROCEDURE — 94640 AIRWAY INHALATION TREATMENT: CPT

## 2017-02-18 PROCEDURE — 84100 ASSAY OF PHOSPHORUS: CPT

## 2017-02-18 PROCEDURE — 80048 BASIC METABOLIC PNL TOTAL CA: CPT

## 2017-02-18 PROCEDURE — 25000003 PHARM REV CODE 250: Performed by: SURGERY

## 2017-02-18 PROCEDURE — 85025 COMPLETE CBC W/AUTO DIFF WBC: CPT

## 2017-02-18 PROCEDURE — 94761 N-INVAS EAR/PLS OXIMETRY MLT: CPT

## 2017-02-18 PROCEDURE — 83735 ASSAY OF MAGNESIUM: CPT

## 2017-02-18 PROCEDURE — 63600175 PHARM REV CODE 636 W HCPCS: Performed by: SURGERY

## 2017-02-18 PROCEDURE — 27000221 HC OXYGEN, UP TO 24 HOURS

## 2017-02-18 PROCEDURE — 36415 COLL VENOUS BLD VENIPUNCTURE: CPT

## 2017-02-18 PROCEDURE — 25000003 PHARM REV CODE 250: Performed by: STUDENT IN AN ORGANIZED HEALTH CARE EDUCATION/TRAINING PROGRAM

## 2017-02-18 PROCEDURE — 20600001 HC STEP DOWN PRIVATE ROOM

## 2017-02-18 RX ORDER — AMOXICILLIN 250 MG
1 CAPSULE ORAL 2 TIMES DAILY
Status: DISCONTINUED | OUTPATIENT
Start: 2017-02-18 | End: 2017-03-01 | Stop reason: HOSPADM

## 2017-02-18 RX ORDER — SULFAMETHOXAZOLE AND TRIMETHOPRIM 800; 160 MG/1; MG/1
1 TABLET ORAL 2 TIMES DAILY
Status: COMPLETED | OUTPATIENT
Start: 2017-02-18 | End: 2017-02-20

## 2017-02-18 RX ADMIN — LISINOPRIL 2.5 MG: 2.5 TABLET ORAL at 09:02

## 2017-02-18 RX ADMIN — APIXABAN 2.5 MG: 2.5 TABLET, FILM COATED ORAL at 10:02

## 2017-02-18 RX ADMIN — PANTOPRAZOLE SODIUM 40 MG: 40 TABLET, DELAYED RELEASE ORAL at 09:02

## 2017-02-18 RX ADMIN — ESCITALOPRAM OXALATE 10 MG: 10 TABLET ORAL at 09:02

## 2017-02-18 RX ADMIN — ASPIRIN 81 MG: 81 TABLET, COATED ORAL at 09:02

## 2017-02-18 RX ADMIN — ATORVASTATIN CALCIUM 10 MG: 10 TABLET, FILM COATED ORAL at 09:02

## 2017-02-18 RX ADMIN — STANDARDIZED SENNA CONCENTRATE AND DOCUSATE SODIUM 1 TABLET: 8.6; 5 TABLET, FILM COATED ORAL at 09:02

## 2017-02-18 RX ADMIN — AMPICILLIN AND SULBACTAM 1.5 G: 1; .5 INJECTION, POWDER, FOR SOLUTION INTRAMUSCULAR; INTRAVENOUS at 05:02

## 2017-02-18 RX ADMIN — METOPROLOL TARTRATE 50 MG: 50 TABLET, FILM COATED ORAL at 09:02

## 2017-02-18 RX ADMIN — MEMANTINE HYDROCHLORIDE 10 MG: 5 TABLET ORAL at 09:02

## 2017-02-18 RX ADMIN — MIRTAZAPINE 30 MG: 30 TABLET, FILM COATED ORAL at 10:02

## 2017-02-18 RX ADMIN — STANDARDIZED SENNA CONCENTRATE AND DOCUSATE SODIUM 1 TABLET: 8.6; 5 TABLET, FILM COATED ORAL at 10:02

## 2017-02-18 RX ADMIN — IPRATROPIUM BROMIDE 0.5 MG: 0.5 SOLUTION RESPIRATORY (INHALATION) at 04:02

## 2017-02-18 RX ADMIN — ARIPIPRAZOLE 5 MG: 5 TABLET ORAL at 09:02

## 2017-02-18 RX ADMIN — IPRATROPIUM BROMIDE 0.5 MG: 0.5 SOLUTION RESPIRATORY (INHALATION) at 07:02

## 2017-02-18 RX ADMIN — APIXABAN 2.5 MG: 2.5 TABLET, FILM COATED ORAL at 09:02

## 2017-02-18 RX ADMIN — SULFAMETHOXAZOLE AND TRIMETHOPRIM 1 TABLET: 800; 160 TABLET ORAL at 10:02

## 2017-02-18 RX ADMIN — MEMANTINE HYDROCHLORIDE 10 MG: 5 TABLET ORAL at 10:02

## 2017-02-18 RX ADMIN — IPRATROPIUM BROMIDE 0.5 MG: 0.5 SOLUTION RESPIRATORY (INHALATION) at 12:02

## 2017-02-18 NOTE — PROGRESS NOTES
Progress Note  Vascular Surgery    Admit Date: 2/14/2017  Post-operative Day: 4 Days Post-Op  Hospital Day: 5    SUBJECTIVE:     Follow-up For:  Procedure(s) (LRB):  THROMBECTOMY (Left)  FASCIOTOMY (Left)    Patient seen and examined. ELDER. Some pain in leg but pain controlled with medication.     OBJECTIVE:     Vital Signs (Most Recent)  Temp: 97.6 °F (36.4 °C) (02/18/17 1141)  Pulse: 80 (02/18/17 1300)  Resp: 17 (02/18/17 1141)  BP: 123/77 (02/18/17 1141)  SpO2: (!) 92 % (02/18/17 1141)    Vital Signs Range (Last 24H):  Temp:  [97.1 °F (36.2 °C)-98.3 °F (36.8 °C)]   Pulse:  [70-91]   Resp:  [15-18]   BP: ()/(53-77)   SpO2:  [92 %-95 %]     I & O (Last 24H):    Intake/Output Summary (Last 24 hours) at 02/18/17 1345  Last data filed at 02/17/17 1600   Gross per 24 hour   Intake              120 ml   Output              200 ml   Net              -80 ml     Physical Exam:  General: well developed, well nourished, appears stated age, no distress  Head: normocephalic, atraumatic  Eyes:  PERRL, EOMI  Lungs:  normal respiratory effort  Heart: + Afib currently   Abdomen: soft, non-distended, non-tender to palpation   Extremities: left lower extremity fasciotomies evaluated - muscle is viable, wound vac placed. 2+ PT on left    Laboratory:  CBC:     Recent Labs  Lab 02/18/17  0433   WBC 6.87   RBC 3.20*   HGB 9.4*   HCT 28.9*      MCV 90   MCH 29.4   MCHC 32.5     CMP:   Recent Labs  Lab 02/15/17  0410  02/18/17  0433   *  < > 93   CALCIUM 8.3*  < > 8.4*   ALBUMIN 2.6*  --   --    PROT 5.9*  --   --      < > 139   K 4.6  < > 3.9   CO2 23  < > 25     < > 103   BUN 20  < > 17   CREATININE 1.0  < > 0.9   ALKPHOS 88  --   --    ALT 21  --   --    AST 81*  --   --    BILITOT 0.7  --   --    < > = values in this interval not displayed.  Coagulation:     Recent Labs  Lab 02/15/17  0410   INR 1.0   APTT 25.9       ASSESSMENT/PLAN:     Assessment: 83 yo F with dementia, HTN, paroxysmal atrial  fibrillation with left common femoral,popliteal embolus POD 1 left femoral embolectomy of CFA, PFA and SFA with popliteal and tibial embolectomy and LLE fasciotomy.    Plan:   Neuro: Continue pain control with po pain medication   + baseline dementia, oriented to person => home medications resumed  Pulm: Saturating well on 1L NC; Continue pulmonary toilet with neb treatments and incentive spirometry   Cardiovascular:    - Cardiology consulted: CARMEN score of 6; Recommended metoprolol 100 mg po bid & addition of Lisinopril   - Echo 2/15/17 noted EF of 20%   GI: cardiac diet as tolerated   : UOP not recorded overnight  FEN: Replace lytes as needed; No IVFs   Heme/ID:  + Thromboembolic Event => patient on Eliquis - will do 2.5mg BID due to patient's age (>80)    + UTI per UA (+Nitrite & Many bacteria); however, UCx is negative to date => will treat UTI with Unasyn x3 days - will change to Bactrim DS for 2 additional days   Wound: Wound vac applied to left lateral wound  PT/OT to evaluate and treat  CM: DC planning    Amelie Diego MD, PGY-1  General Surgery  [phone]

## 2017-02-18 NOTE — PLAN OF CARE
Problem: Fall Risk (Adult)  Goal: Identify Related Risk Factors and Signs and Symptoms  Related risk factors and signs and symptoms are identified upon initiation of Human Response Clinical Practice Guideline (CPG)   Outcome: Ongoing (interventions implemented as appropriate)  POC reviewed with pt and pt's ,  verbalized understanding.  Pt is alert to self. Pt continues to have to be fed, but does not have a large appetite.  Pt incontinent of urine, no BM this shift.  Pt has no c/o pain.  Pt has wound vac to left lower extremity.  Will cont to monitor and support as needed.

## 2017-02-18 NOTE — PLAN OF CARE
Problem: Patient Care Overview  Goal: Plan of Care Review  Outcome: Ongoing (interventions implemented as appropriate)  The pt is confused to place and situation.  Pt did try to get out of bed once during first part of shift.  The bed alarm was set and should always be set on this pt.  The  slept in the room during the shift but will not help with the pt at all.  The  is of no support to the pt.  The pt needs to be fed all meals because she will not feed her self. Pt was injury free during night shift.

## 2017-02-19 LAB
ANION GAP SERPL CALC-SCNC: 7 MMOL/L
BASOPHILS # BLD AUTO: 0.02 K/UL
BASOPHILS NFR BLD: 0.3 %
BUN SERPL-MCNC: 18 MG/DL
CALCIUM SERPL-MCNC: 8.4 MG/DL
CHLORIDE SERPL-SCNC: 104 MMOL/L
CO2 SERPL-SCNC: 29 MMOL/L
CREAT SERPL-MCNC: 0.9 MG/DL
DIFFERENTIAL METHOD: ABNORMAL
EOSINOPHIL # BLD AUTO: 0.3 K/UL
EOSINOPHIL NFR BLD: 4.8 %
ERYTHROCYTE [DISTWIDTH] IN BLOOD BY AUTOMATED COUNT: 13.6 %
EST. GFR  (AFRICAN AMERICAN): >60 ML/MIN/1.73 M^2
EST. GFR  (NON AFRICAN AMERICAN): 59.7 ML/MIN/1.73 M^2
GLUCOSE SERPL-MCNC: 95 MG/DL
HCT VFR BLD AUTO: 31 %
HGB BLD-MCNC: 10 G/DL
LYMPHOCYTES # BLD AUTO: 1.3 K/UL
LYMPHOCYTES NFR BLD: 19.6 %
MAGNESIUM SERPL-MCNC: 1.4 MG/DL
MCH RBC QN AUTO: 29.3 PG
MCHC RBC AUTO-ENTMCNC: 32.3 %
MCV RBC AUTO: 91 FL
MONOCYTES # BLD AUTO: 0.6 K/UL
MONOCYTES NFR BLD: 8.5 %
NEUTROPHILS # BLD AUTO: 4.3 K/UL
NEUTROPHILS NFR BLD: 65.3 %
PHOSPHATE SERPL-MCNC: 3.3 MG/DL
PLATELET # BLD AUTO: 272 K/UL
PMV BLD AUTO: 9.2 FL
POTASSIUM SERPL-SCNC: 4.1 MMOL/L
RBC # BLD AUTO: 3.41 M/UL
SODIUM SERPL-SCNC: 140 MMOL/L
WBC # BLD AUTO: 6.62 K/UL

## 2017-02-19 PROCEDURE — 36415 COLL VENOUS BLD VENIPUNCTURE: CPT

## 2017-02-19 PROCEDURE — 84100 ASSAY OF PHOSPHORUS: CPT

## 2017-02-19 PROCEDURE — 20600001 HC STEP DOWN PRIVATE ROOM

## 2017-02-19 PROCEDURE — 25000003 PHARM REV CODE 250: Performed by: SURGERY

## 2017-02-19 PROCEDURE — 63600175 PHARM REV CODE 636 W HCPCS: Performed by: SURGERY

## 2017-02-19 PROCEDURE — 83735 ASSAY OF MAGNESIUM: CPT

## 2017-02-19 PROCEDURE — 97530 THERAPEUTIC ACTIVITIES: CPT

## 2017-02-19 PROCEDURE — 27000221 HC OXYGEN, UP TO 24 HOURS

## 2017-02-19 PROCEDURE — 80048 BASIC METABOLIC PNL TOTAL CA: CPT

## 2017-02-19 PROCEDURE — 85025 COMPLETE CBC W/AUTO DIFF WBC: CPT

## 2017-02-19 PROCEDURE — 94760 N-INVAS EAR/PLS OXIMETRY 1: CPT

## 2017-02-19 PROCEDURE — 94640 AIRWAY INHALATION TREATMENT: CPT

## 2017-02-19 PROCEDURE — 25000003 PHARM REV CODE 250: Performed by: STUDENT IN AN ORGANIZED HEALTH CARE EDUCATION/TRAINING PROGRAM

## 2017-02-19 RX ADMIN — APIXABAN 2.5 MG: 2.5 TABLET, FILM COATED ORAL at 09:02

## 2017-02-19 RX ADMIN — MEMANTINE HYDROCHLORIDE 10 MG: 5 TABLET ORAL at 09:02

## 2017-02-19 RX ADMIN — PANTOPRAZOLE SODIUM 40 MG: 40 TABLET, DELAYED RELEASE ORAL at 09:02

## 2017-02-19 RX ADMIN — ATORVASTATIN CALCIUM 10 MG: 10 TABLET, FILM COATED ORAL at 09:02

## 2017-02-19 RX ADMIN — ESCITALOPRAM OXALATE 10 MG: 10 TABLET ORAL at 09:02

## 2017-02-19 RX ADMIN — SULFAMETHOXAZOLE AND TRIMETHOPRIM 1 TABLET: 800; 160 TABLET ORAL at 09:02

## 2017-02-19 RX ADMIN — IPRATROPIUM BROMIDE 0.5 MG: 0.5 SOLUTION RESPIRATORY (INHALATION) at 11:02

## 2017-02-19 RX ADMIN — STANDARDIZED SENNA CONCENTRATE AND DOCUSATE SODIUM 1 TABLET: 8.6; 5 TABLET, FILM COATED ORAL at 09:02

## 2017-02-19 RX ADMIN — MIRTAZAPINE 30 MG: 30 TABLET, FILM COATED ORAL at 09:02

## 2017-02-19 RX ADMIN — HYDROCODONE BITARTRATE AND ACETAMINOPHEN 1 TABLET: 5; 325 TABLET ORAL at 10:02

## 2017-02-19 RX ADMIN — IPRATROPIUM BROMIDE 0.5 MG: 0.5 SOLUTION RESPIRATORY (INHALATION) at 04:02

## 2017-02-19 RX ADMIN — ARIPIPRAZOLE 5 MG: 5 TABLET ORAL at 09:02

## 2017-02-19 RX ADMIN — METOPROLOL TARTRATE 50 MG: 50 TABLET, FILM COATED ORAL at 09:02

## 2017-02-19 RX ADMIN — ASPIRIN 81 MG: 81 TABLET, COATED ORAL at 09:02

## 2017-02-19 RX ADMIN — IPRATROPIUM BROMIDE 0.5 MG: 0.5 SOLUTION RESPIRATORY (INHALATION) at 07:02

## 2017-02-19 RX ADMIN — LISINOPRIL 2.5 MG: 2.5 TABLET ORAL at 09:02

## 2017-02-19 RX ADMIN — IPRATROPIUM BROMIDE 0.5 MG: 0.5 SOLUTION RESPIRATORY (INHALATION) at 12:02

## 2017-02-19 NOTE — PROGRESS NOTES
Progress Note  Vascular Surgery    Admit Date: 2/14/2017  Post-operative Day: 5 Days Post-Op  Hospital Day: 6    SUBJECTIVE:     Follow-up For:  Procedure(s) (LRB):  THROMBECTOMY (Left)  FASCIOTOMY (Left)    No acute events overnight.    OBJECTIVE:     Vital Signs (Most Recent)  Temp: 96.9 °F (36.1 °C) (02/19/17 0426)  Pulse: 81 (02/19/17 0900)  Resp: 18 (02/19/17 0710)  BP: 126/64 (02/19/17 0426)  SpO2: (!) 94 % (02/19/17 0710)    Vital Signs Range (Last 24H):  Temp:  [96.9 °F (36.1 °C)-98.4 °F (36.9 °C)]   Pulse:  [77-94]   Resp:  [14-20]   BP: (100-129)/(58-83)   SpO2:  [92 %-94 %]     I & O (Last 24H):    Intake/Output Summary (Last 24 hours) at 02/19/17 1015  Last data filed at 02/18/17 2000   Gross per 24 hour   Intake              480 ml   Output                0 ml   Net              480 ml     Physical Exam:  General: well developed, well nourished, appears stated age, no distress  Head: normocephalic, atraumatic  Eyes:  PERRL, EOMI  Lungs:  normal respiratory effort  Heart: normal sinus  Extremities: left lower extremity wound vac to lateral incision, medial incision of fasciotomy closed; improved edema; groin incision c/d/i  Vascular: LLE: 2+DP; RLE: 2+ PT    CBC:     Recent Labs  Lab 02/19/17 0434   WBC 6.62   RBC 3.41*   HGB 10.0*   HCT 31.0*      MCV 91   MCH 29.3   MCHC 32.3     CMP:   Recent Labs  Lab 02/15/17  0410  02/19/17  0434   *  < > 95   CALCIUM 8.3*  < > 8.4*   ALBUMIN 2.6*  --   --    PROT 5.9*  --   --      < > 140   K 4.6  < > 4.1   CO2 23  < > 29     < > 104   BUN 20  < > 18   CREATININE 1.0  < > 0.9   ALKPHOS 88  --   --    ALT 21  --   --    AST 81*  --   --    BILITOT 0.7  --   --    < > = values in this interval not displayed.  Coagulation:     Recent Labs  Lab 02/15/17  0410   INR 1.0   APTT 25.9       ASSESSMENT/PLAN:     Assessment: 83 yo F with dementia, HTN, paroxysmal atrial fibrillation with left common femoral,popliteal embolus s/p  left femoral  embolectomy of CFA, PFA and SFA with popliteal and tibial embolectomy and LLE fasciotomy.    Plan:   Neuro: Continue pain control with po pain medication   + baseline dementia, oriented to person => home medications resumed  Pulm: Saturating well on 1L NC; continue pulmonary toilet  Cardiovascular: CARMEN score of 6; on metoprolol; lisinopril   - Echo 2/15/17 noted EF of 20%   GI: cardiac diet as tolerated   : incontinent  FEN: replace electrolytes as needed  Heme/ID: Continue eliquis 2.5 mg po bid   Bactrim DS for presumptive UTI on UA  PT/OT to evaluate and treat  CM: DELORIS planning      Aurelia Metzger, DO  PGY-6, Vascular fellow   [phone]

## 2017-02-19 NOTE — PLAN OF CARE
PLAN OF CARE: pt & pts  verbalized understanding of plan of care, PT & OT as tolerated per pt, promote eating, pts  encouraged to participate in pts plan of care & help with her ADL's

## 2017-02-19 NOTE — PLAN OF CARE
Problem: Patient Care Overview  Goal: Plan of Care Review  Outcome: Ongoing (interventions implemented as appropriate)  The pt is confused to place time and situation, so it is difficult to know just how much the pt understands the treatment she is getting.  But once during the night the pt asked questions about her left leg and if it would get better.  The pt denied pain during the shift. The wound vac was with in normal limits during night shift.

## 2017-02-19 NOTE — PLAN OF CARE
Problem: Physical Therapy Goal  Goal: Physical Therapy Goal  Goals to be met by: 3/1/17     Patient will increase functional independence with mobility by performin. Supine to sit with Contact Guard Assistance  2. Sit to stand transfer with Contact Guard Assistance  3. Gait x 25 feet with Minimal Assistance using Rolling Walker.   4. Lower extremity exercise program x15 reps per handout, with assistance as needed   Pt progressing towards goals. continue with PT POC.Goals remain appropriate.     Param Acevedo PTA  2017

## 2017-02-19 NOTE — PT/OT/SLP PROGRESS
Physical Therapy  Treatment    Boubacar Chen   MRN: [id number]   Admitting Diagnosis: Lower limb ischemia    PT Received On: 17  PT Start Time: 1100     PT Stop Time: 1125    PT Total Time (min): 25 min       Billable Minutes:  Therapeutic Activity 15 and Therapeutic Exercise 10    Treatment Type: Treatment  PT/PTA: PTA     PTA Visit Number: 1       General Precautions: Standard, fall  Orthopedic Precautions: LLE non weight bearing   Braces: N/A    Do you have any cultural, spiritual, Sikh conflicts, given your current situation?: none     Subjective:  Communicated with RN prior to session.  I will try    Pain Ratin/10  Location - Side: Left  Location - Orientation: lower  Location: leg  Pain Addressed: Pre-medicate for activity, Reposition  Pain Rating Post-Intervention: 5/10    Objective:   Patient found with: telemetry, peripheral IV, oxygen    Functional Mobility:  Bed Mobility:   Rolling/Turning Right: Maximum assistance  Supine to Sit: Moderate Assistance  Sit to Supine: Maximum Assistance    Transfers:  Sit <> Stand Assistance: Total Assistance (maxA x 1 for hip elevation and another staff to (A) with maintaining NWB L LEwith min/maxA)  Sit <> Stand Assistive Device: Rolling Walker (vcs for posture,hand placement and maintaining NWB L LE x 3 trials)  Static standing for 30 secs,25 secs and 35 secs with RW with Cookie and another staff to (A) with maintaining NWB L LE with min/maxA at L LE)     Gait:   Gait Distance: unable to perform    Balance:    Static Sit: FAIR-: Maintains without assist but inconsistent   Dynamic Sit: FAIR: Cannot move trunk without losing balance  Static Stand: POOR: Needs Maximal assist to maintain  Dynamic stand: 0: N/A  Therapeutic Activities and Exercises:  Discussed pt progress,safety and WB status  Patient performed therex X 15 reps seated B LE AROM AP, LAQ, Hip Flexion   White board updated    AM-PAC 6 CLICK MOBILITY  How much help from another person does this  patient currently need?   1 = Unable, Total/Dependent Assistance  2 = A lot, Maximum/Moderate Assistance  3 = A little, Minimum/Contact Guard/Supervision  4 = None, Modified [place]/Independent    Turning over in bed (including adjusting bedclothes, sheets and blankets)?: 2  Sitting down on and standing up from a chair with arms (e.g., wheelchair, bedside commode, etc.): 2  Moving from lying on back to sitting on the side of the bed?: 2  Moving to and from a bed to a chair (including a wheelchair)?: 1  Need to walk in hospital room?: 1  Climbing 3-5 steps with a railing?: 1  Total Score: 9    AM-PAC Raw Score CMS G-Code Modifier Level of Impairment Assistance   6 % Total / Unable   7 - 9 CM 80 - 100% Maximal Assist   10 - 14 CL 60 - 80% Moderate Assist   15 - 19 CK 40 - 60% Moderate Assist   20 - 22 CJ 20 - 40% Minimal Assist   23 CI 1-20% SBA / CGA   24 CH 0% Independent/ Mod I     Patient left supine with all lines intact, call button in reach and nsg notified.    Assessment:  Boubacar Chen is a 82 y.o. female with a medical diagnosis of Lower limb ischemia and presents with continued deficits as listed below. Pt  motivated and cooperative with treatment session. Pt Progressing with PT Intervention.pt required (A) and vcs for maintaining NWB L LE.  Pt would continue to benefit from skilled PT to address overall functional mobility and goals. Goals remain appropriate.    Rehab identified problem list/impairments: Rehab identified problem list/impairments: weakness, impaired endurance, impaired self care skills, gait instability, impaired functional mobilty, impaired balance, impaired joint extensibility, impaired skin, edema, orthopedic precautions, impaired cognition, decreased lower extremity function    Rehab potential is good.    Activity tolerance: Good    Discharge recommendations: Discharge Facility/Level Of Care Needs: nursing facility, skilled     Barriers to discharge: Barriers to  Discharge: None    Equipment recommendations: Equipment Needed After Discharge: bedside commode, walker, rolling, wheelchair     GOALS:   Physical Therapy Goals        Problem: Physical Therapy Goal    Goal Priority Disciplines Outcome Goal Variances Interventions   Physical Therapy Goal     PT/OT, PT Ongoing (interventions implemented as appropriate)     Description:  Goals to be met by: 3/1/17     Patient will increase functional independence with mobility by performin. Supine to sit with Contact Guard Assistance  2. Sit to stand transfer with Contact Guard Assistance  3. Gait  x 25 feet with Minimal Assistance using Rolling Walker.   4. Lower extremity exercise program x15 reps per handout, with assistance as needed                PLAN:    Patient to be seen 5 x/week  to address the above listed problems via gait training, therapeutic activities, therapeutic exercises  Plan of Care expires: 17  Plan of Care reviewed with: patient         Param Acevedo, PTA  2017

## 2017-02-19 NOTE — PROGRESS NOTES
Dr Parra ok with holding the b/p medication because pt's b/p was 100 and hrt rate in the 70's.  Will continue to monitor.

## 2017-02-20 LAB
ANION GAP SERPL CALC-SCNC: 7 MMOL/L
ANISOCYTOSIS BLD QL SMEAR: SLIGHT
BASOPHILS # BLD AUTO: ABNORMAL K/UL
BASOPHILS NFR BLD: 0 %
BUN SERPL-MCNC: 16 MG/DL
BURR CELLS BLD QL SMEAR: ABNORMAL
CALCIUM SERPL-MCNC: 8.8 MG/DL
CHLORIDE SERPL-SCNC: 105 MMOL/L
CO2 SERPL-SCNC: 28 MMOL/L
CREAT SERPL-MCNC: 1.1 MG/DL
DIFFERENTIAL METHOD: ABNORMAL
EOSINOPHIL # BLD AUTO: ABNORMAL K/UL
EOSINOPHIL NFR BLD: 3 %
ERYTHROCYTE [DISTWIDTH] IN BLOOD BY AUTOMATED COUNT: 13.7 %
EST. GFR  (AFRICAN AMERICAN): 54 ML/MIN/1.73 M^2
EST. GFR  (NON AFRICAN AMERICAN): 46.9 ML/MIN/1.73 M^2
GLUCOSE SERPL-MCNC: 94 MG/DL
HCT VFR BLD AUTO: 31.5 %
HGB BLD-MCNC: 10 G/DL
LYMPHOCYTES # BLD AUTO: ABNORMAL K/UL
LYMPHOCYTES NFR BLD: 26 %
MAGNESIUM SERPL-MCNC: 1.5 MG/DL
MCH RBC QN AUTO: 29.4 PG
MCHC RBC AUTO-ENTMCNC: 31.7 %
MCV RBC AUTO: 93 FL
METAMYELOCYTES NFR BLD MANUAL: 1 %
MONOCYTES # BLD AUTO: ABNORMAL K/UL
MONOCYTES NFR BLD: 3 %
NEUTROPHILS NFR BLD: 67 %
PHOSPHATE SERPL-MCNC: 3.8 MG/DL
PLATELET # BLD AUTO: 287 K/UL
PLATELET BLD QL SMEAR: ABNORMAL
PMV BLD AUTO: 9 FL
POIKILOCYTOSIS BLD QL SMEAR: SLIGHT
POLYCHROMASIA BLD QL SMEAR: ABNORMAL
POTASSIUM SERPL-SCNC: 4.3 MMOL/L
RBC # BLD AUTO: 3.4 M/UL
SODIUM SERPL-SCNC: 140 MMOL/L
TB INDURATION 48 - 72 HR READ: 0 MM
WBC # BLD AUTO: 6.92 K/UL

## 2017-02-20 PROCEDURE — 85007 BL SMEAR W/DIFF WBC COUNT: CPT

## 2017-02-20 PROCEDURE — 94640 AIRWAY INHALATION TREATMENT: CPT

## 2017-02-20 PROCEDURE — 36415 COLL VENOUS BLD VENIPUNCTURE: CPT

## 2017-02-20 PROCEDURE — 63600175 PHARM REV CODE 636 W HCPCS: Performed by: SURGERY

## 2017-02-20 PROCEDURE — 25000003 PHARM REV CODE 250: Performed by: SURGERY

## 2017-02-20 PROCEDURE — 97530 THERAPEUTIC ACTIVITIES: CPT

## 2017-02-20 PROCEDURE — 20600001 HC STEP DOWN PRIVATE ROOM

## 2017-02-20 PROCEDURE — 84100 ASSAY OF PHOSPHORUS: CPT

## 2017-02-20 PROCEDURE — 97110 THERAPEUTIC EXERCISES: CPT

## 2017-02-20 PROCEDURE — 27000221 HC OXYGEN, UP TO 24 HOURS

## 2017-02-20 PROCEDURE — 25000003 PHARM REV CODE 250: Performed by: STUDENT IN AN ORGANIZED HEALTH CARE EDUCATION/TRAINING PROGRAM

## 2017-02-20 PROCEDURE — 85027 COMPLETE CBC AUTOMATED: CPT

## 2017-02-20 PROCEDURE — 83735 ASSAY OF MAGNESIUM: CPT

## 2017-02-20 PROCEDURE — 80048 BASIC METABOLIC PNL TOTAL CA: CPT

## 2017-02-20 PROCEDURE — 94760 N-INVAS EAR/PLS OXIMETRY 1: CPT

## 2017-02-20 RX ORDER — MAGNESIUM SULFATE HEPTAHYDRATE 40 MG/ML
2 INJECTION, SOLUTION INTRAVENOUS EVERY 4 HOURS
Status: COMPLETED | OUTPATIENT
Start: 2017-02-20 | End: 2017-02-20

## 2017-02-20 RX ADMIN — PANTOPRAZOLE SODIUM 40 MG: 40 TABLET, DELAYED RELEASE ORAL at 08:02

## 2017-02-20 RX ADMIN — APIXABAN 2.5 MG: 2.5 TABLET, FILM COATED ORAL at 08:02

## 2017-02-20 RX ADMIN — LISINOPRIL 2.5 MG: 2.5 TABLET ORAL at 08:02

## 2017-02-20 RX ADMIN — STANDARDIZED SENNA CONCENTRATE AND DOCUSATE SODIUM 1 TABLET: 8.6; 5 TABLET, FILM COATED ORAL at 08:02

## 2017-02-20 RX ADMIN — ASPIRIN 81 MG: 81 TABLET, COATED ORAL at 08:02

## 2017-02-20 RX ADMIN — IPRATROPIUM BROMIDE 0.5 MG: 0.5 SOLUTION RESPIRATORY (INHALATION) at 08:02

## 2017-02-20 RX ADMIN — ATORVASTATIN CALCIUM 10 MG: 10 TABLET, FILM COATED ORAL at 08:02

## 2017-02-20 RX ADMIN — METOPROLOL TARTRATE 50 MG: 50 TABLET, FILM COATED ORAL at 08:02

## 2017-02-20 RX ADMIN — MIRTAZAPINE 30 MG: 30 TABLET, FILM COATED ORAL at 08:02

## 2017-02-20 RX ADMIN — ARIPIPRAZOLE 5 MG: 5 TABLET ORAL at 08:02

## 2017-02-20 RX ADMIN — MEMANTINE HYDROCHLORIDE 10 MG: 5 TABLET ORAL at 08:02

## 2017-02-20 RX ADMIN — IPRATROPIUM BROMIDE 0.5 MG: 0.5 SOLUTION RESPIRATORY (INHALATION) at 04:02

## 2017-02-20 RX ADMIN — SULFAMETHOXAZOLE AND TRIMETHOPRIM 1 TABLET: 800; 160 TABLET ORAL at 08:02

## 2017-02-20 RX ADMIN — ESCITALOPRAM OXALATE 10 MG: 10 TABLET ORAL at 08:02

## 2017-02-20 RX ADMIN — HYDROCODONE BITARTRATE AND ACETAMINOPHEN 1 TABLET: 5; 325 TABLET ORAL at 03:02

## 2017-02-20 RX ADMIN — HYDROCODONE BITARTRATE AND ACETAMINOPHEN 1 TABLET: 5; 325 TABLET ORAL at 08:02

## 2017-02-20 RX ADMIN — MAGNESIUM SULFATE IN WATER 2 G: 40 INJECTION, SOLUTION INTRAVENOUS at 06:02

## 2017-02-20 RX ADMIN — MAGNESIUM SULFATE IN WATER 2 G: 40 INJECTION, SOLUTION INTRAVENOUS at 11:02

## 2017-02-20 NOTE — PLAN OF CARE
Covering SW was asked by LAURA, Elbert Rubio, to follow-up on SNF choices for Pt. SW went to meet with Pt's  who provided SW with the following three SNF choices:   1) [facility]  2) [facility]  3) [facility]   SW asked SSC, MsThomas Antonette, to please initiate referrals to these agencies.    DEREK OlsenW

## 2017-02-20 NOTE — PROGRESS NOTES
Contacted Dr Ortiz ok not to given the Metropalol during night shift due to pt's b/p at 106/58 and hrt rate 85. Nursing will continue to evaluate during shift.

## 2017-02-20 NOTE — PROGRESS NOTES
Patient's BP 96/50. Paged and informed on-call physician for Dr. Bernard.  No new orders given. WCTM.

## 2017-02-20 NOTE — PROGRESS NOTES
Progress Note  Vascular Surgery    Admit Date: 2/14/2017  Post-operative Day: 6 Days Post-Op  Hospital Day: 7    SUBJECTIVE:     Follow-up For:  Procedure(s) (LRB):  THROMBECTOMY (Left)  FASCIOTOMY (Left)    No acute events overnight.    OBJECTIVE:     Vital Signs (Most Recent)  Temp: 97 °F (36.1 °C) (02/20/17 0447)  Pulse: 81 (02/20/17 0700)  Resp: 16 (02/20/17 0447)  BP: (!) 120/57 (02/20/17 0447)  SpO2: (!) 91 % (02/20/17 0447)    Vital Signs Range (Last 24H):  Temp:  [96.7 °F (35.9 °C)-97.6 °F (36.4 °C)]   Pulse:  [70-92]   Resp:  [15-20]   BP: ()/(53-60)   SpO2:  [91 %-95 %]     I & O (Last 24H):    Intake/Output Summary (Last 24 hours) at 02/20/17 0728  Last data filed at 02/19/17 1711   Gross per 24 hour   Intake              220 ml   Output              100 ml   Net              120 ml     Physical Exam:  General: well developed, well nourished, appears stated age, no distress  Head: normocephalic, atraumatic  Eyes:  PERRL, EOMI  Lungs:  normal respiratory effort  Heart: normal sinus  Extremities: left lower extremity wound vac to lateral incision, medial incision of fasciotomy closed; improved edema; groin incision c/d/i  Vascular: LLE: 2+DP; RLE: 2+ PT    CBC:     Recent Labs  Lab 02/19/17 0434 02/20/17 0455   WBC 6.62  --    RBC 3.41* 3.40*   HGB 10.0* 10.0*   HCT 31.0* 31.5*    287   MCV 91 93   MCH 29.3 29.4   MCHC 32.3 31.7*     CMP:   Recent Labs  Lab 02/15/17  0410  02/20/17 0455   *  < > 94   CALCIUM 8.3*  < > 8.8   ALBUMIN 2.6*  --   --    PROT 5.9*  --   --      < > 140   K 4.6  < > 4.3   CO2 23  < > 28     < > 105   BUN 20  < > 16   CREATININE 1.0  < > 1.1   ALKPHOS 88  --   --    ALT 21  --   --    AST 81*  --   --    BILITOT 0.7  --   --    < > = values in this interval not displayed.  Coagulation:     Recent Labs  Lab 02/15/17  0410   INR 1.0   APTT 25.9       ASSESSMENT/PLAN:     Assessment: 83 yo F with dementia, HTN, paroxysmal atrial fibrillation with  left common femoral,popliteal embolus s/p  left femoral embolectomy of CFA, PFA and SFA with popliteal and tibial embolectomy and LLE fasciotomy.    Plan:   Neuro: Continue pain control with po pain medication   + baseline dementia, oriented to person => home medications resumed  Pulm: Saturating well on 2L NC; continue pulmonary toilet  Cardiovascular: CARMEN score of 6; on metoprolol; lisinopril   - Echo 2/15/17 noted EF of 20%   GI: cardiac diet as tolerated   : incontinent  FEN: replace electrolytes as needed  Heme/ID: Continue eliquis 2.5 mg po bid   Bactrim DS for presumptive UTI on admission UA (Ucx no growth)  PT/OT to evaluate and treat  CM: DC planning, stable for discharge to SNF     Jahaira Florentino MD  PGY-3 General Surgery  Pager: [phone]

## 2017-02-20 NOTE — PLAN OF CARE
CM spoke to Elizabeth at [facility] [address] [place], [place]  [phone] to clarify this is an Assisted Living only, not NH or SNF.  Elizabeth did confirm this.  CM met with pt's  and asked him why he refused to meet with NH liaison,  stated because he wanted her to go back to [facility].  CM explained that pt requires wound care and PT/OT before she can go back to Assisted Living and reiterated that this can not be done at [facility] because I called and verified this.   then requested a list for the [place] area, list provided and informed pt I would be back later today to get his 3 choices,  verbalized understanding.    Paged Dr. Diego to update her on case.    Mildred Rubio RN, CCM [facility]  SICU/ENT/Vascular Surgery  Ext [phone]

## 2017-02-20 NOTE — PHYSICIAN QUERY
"PT Name: Boubacar Chen  MR #: [id number]     Physician Query Form - Documentation Clarification    Reviewer  Marta FINN, RN, CCDS Ext  [phone]     This form is a permanent document in the medical record.     Query Date: February 20, 2017  By submitting this query, we are merely seeking further clarification of documentation to reflect the severity of illness of your patient. Please utilize your independent clinical judgment when addressing the question(s) below.    (The Medical record reflects the following:)      Supporting Clinical Findings Location in Medical Record      2D ECHO shows new finding of HFrEF with LVEF 20% and biatrial enlargement,  but no apparent LV thrombus.       1. HFrEF   - new diagnosis, recommend starting guide-line based tx for HFrEF including max tolerated doses of beta blockers and ACE-I / ARB   - agree with primary team in starting low dose metoprolol tartrate 25 mg bid; titrate up as tolerated   - recommend initiating low dose ACE-I if BP tolerates: lisinopril 5 mg once daily         Cardiology Consult 2/15                                                                            Doctor, Please specify diagnosis or diagnoses associated with above clinical findings.    Provider, please further specify the acuity of "new finding of HFrEF" :    [  ] Acute HFrEF    [ X ] Chronic HFrEF    [  ] Other related diagnosis (please specify): _________________________________    Physician Use Only                                                                                                                 [  ] Unable to determine            "

## 2017-02-20 NOTE — PT/OT/SLP PROGRESS
Physical Therapy  Treatment    Boubacar Chen   MRN: [id number]   Admitting Diagnosis: Lower limb ischemia    PT Received On: 17  PT Start Time: 857     PT Stop Time: 920    PT Total Time (min): 23 min       Billable Minutes:  Therapeutic Activity 15 and Therapeutic Exercise 8    Treatment Type: Treatment  PT/PTA: PTA     PTA Visit Number: 2       General Precautions: Standard, fall  Orthopedic Precautions: LLE non weight bearing   Braces:      Do you have any cultural, spiritual, Rastafarian conflicts, given your current situation?: none     Subjective:  Communicated with RN prior to session.  I am tired but I will try    Pain Ratin/10  Location - Side: Left  Location - Orientation: lower  Location: leg  Pain Addressed: Pre-medicate for activity, Reposition  Pain Rating Post-Intervention: 2/10    Objective:   Patient found with: telemetry, peripheral IV, oxygen    Functional Mobility:  Bed Mobility:   Rolling/Turning Right: Minimum assistance  Scooting/Bridging: Minimum Assistance  Supine to Sit: Minimum Assistance, Moderate Assistance    Transfers:  Sit <> Stand Assistance: Total Assistance (modA x 1 for hip elevation with max vcs/tcs for maitnaining NWB L LE)  Sit <> Stand Assistive Device: Rolling Walker (vcs for hand placement and WB status)    Gait:   Gait Distance: unable to perform    Balance:   Static Sit: FAIR+: Able to take MINIMAL challenges from all directions  Dynamic Sit: FAIR+: Maintains balance through MINIMAL excursions of active trunk motion  Static Stand: POOR: Needs MODERATE assist to maintain   Therapeutic Activities and Exercises:  Static standing  For 45 secs,1min and 25 secs. with RW with Cookie for balance and modA to maintain NWB L LE with vcs . Pt urinated upon first stand and required cleaning and donning brief  Patient performed therex X 15 reps seated  B LE AROM AP, LAQ, Hip Flexion  White board updated  AM-PAC 6 CLICK MOBILITY  How much help from another person does this  patient currently need?   1 = Unable, Total/Dependent Assistance  2 = A lot, Maximum/Moderate Assistance  3 = A little, Minimum/Contact Guard/Supervision  4 = None, Modified [place]/Independent    Turning over in bed (including adjusting bedclothes, sheets and blankets)?: 2  Sitting down on and standing up from a chair with arms (e.g., wheelchair, bedside commode, etc.): 2  Moving from lying on back to sitting on the side of the bed?: 2  Moving to and from a bed to a chair (including a wheelchair)?: 1  Need to walk in hospital room?: 1  Climbing 3-5 steps with a railing?: 1  Total Score: 9    AM-PAC Raw Score CMS G-Code Modifier Level of Impairment Assistance   6 % Total / Unable   7 - 9 CM 80 - 100% Maximal Assist   10 - 14 CL 60 - 80% Moderate Assist   15 - 19 CK 40 - 60% Moderate Assist   20 - 22 CJ 20 - 40% Minimal Assist   23 CI 1-20% SBA / CGA   24 CH 0% Independent/ Mod I     Patient left supine with all lines intact, call button in reach and nsg notified.    Assessment:  Boubacar Chen is a 82 y.o. female with a medical diagnosis of Lower limb ischemia and presents with continued deficits as listed below. Pt with good effort with treatment. Pt still requires (A) to maintain NWB L LE.Pt  tolerated treatment fairly well and is progressing slowly with mobility. Pt would continue to benefit from skilled PT to address overall functional mobility and goals. Goals remain appropriate    Rehab identified problem list/impairments: Rehab identified problem list/impairments: weakness, impaired endurance, impaired self care skills, impaired functional mobilty, gait instability, impaired balance, decreased lower extremity function, impaired cognition, edema, impaired skin, impaired joint extensibility, orthopedic precautions    Rehab potential is good.    Activity tolerance: Good    Discharge recommendations: Discharge Facility/Level Of Care Needs: nursing facility, skilled     Barriers to discharge:  Barriers to Discharge: None    Equipment recommendations: Equipment Needed After Discharge: bedside commode, walker, rolling, wheelchair     GOALS:   Physical Therapy Goals        Problem: Physical Therapy Goal    Goal Priority Disciplines Outcome Goal Variances Interventions   Physical Therapy Goal     PT/OT, PT Ongoing (interventions implemented as appropriate)     Description:  Goals to be met by: 3/1/17     Patient will increase functional independence with mobility by performin. Supine to sit with Contact Guard Assistance  2. Sit to stand transfer with Contact Guard Assistance  3. Gait  x 25 feet with Minimal Assistance using Rolling Walker.   4. Lower extremity exercise program x15 reps per handout, with assistance as needed                PLAN:    Patient to be seen 5 x/week  to address the above listed problems via gait training, therapeutic activities, therapeutic exercises  Plan of Care expires: 17  Plan of Care reviewed with: patient         Param Acevedo, PTA  2017

## 2017-02-20 NOTE — PLAN OF CARE
Problem: Patient Care Overview  Goal: Plan of Care Review  Outcome: Ongoing (interventions implemented as appropriate)  The pt was on 1 liter of oxygen during the night along with a brief.  The pt did try to get out of the bed several times during the night because she said she needed to use the bathroom.  The pt was put on the bed pan each time the the pt would dribble a small amount, but the brief would also be wet.  The pt's reaction time for calling help was delayed and she had almost always used the brief first.  The bed alarm was on and  was at bedside during the night.  The  is not any help to the pt and not supportive to the pt. The wound vac was with in normal limits during the night shift.  The pt was injury free during the night.

## 2017-02-21 LAB
ANION GAP SERPL CALC-SCNC: 7 MMOL/L
ANION GAP SERPL CALC-SCNC: 8 MMOL/L
ANISOCYTOSIS BLD QL SMEAR: SLIGHT
BASOPHILS # BLD AUTO: ABNORMAL K/UL
BASOPHILS NFR BLD: 0 %
BUN SERPL-MCNC: 22 MG/DL
BUN SERPL-MCNC: 25 MG/DL
CALCIUM SERPL-MCNC: 8.5 MG/DL
CALCIUM SERPL-MCNC: 8.7 MG/DL
CHLORIDE SERPL-SCNC: 101 MMOL/L
CHLORIDE SERPL-SCNC: 103 MMOL/L
CO2 SERPL-SCNC: 24 MMOL/L
CO2 SERPL-SCNC: 26 MMOL/L
CREAT SERPL-MCNC: 1.2 MG/DL
CREAT SERPL-MCNC: 1.5 MG/DL
DIFFERENTIAL METHOD: ABNORMAL
EOSINOPHIL # BLD AUTO: ABNORMAL K/UL
EOSINOPHIL NFR BLD: 1 %
ERYTHROCYTE [DISTWIDTH] IN BLOOD BY AUTOMATED COUNT: 13.8 %
EST. GFR  (AFRICAN AMERICAN): 37.1 ML/MIN/1.73 M^2
EST. GFR  (AFRICAN AMERICAN): 48.6 ML/MIN/1.73 M^2
EST. GFR  (NON AFRICAN AMERICAN): 32.2 ML/MIN/1.73 M^2
EST. GFR  (NON AFRICAN AMERICAN): 42.2 ML/MIN/1.73 M^2
GLUCOSE SERPL-MCNC: 105 MG/DL
GLUCOSE SERPL-MCNC: 98 MG/DL
HCT VFR BLD AUTO: 32 %
HGB BLD-MCNC: 10.3 G/DL
LYMPHOCYTES # BLD AUTO: ABNORMAL K/UL
LYMPHOCYTES NFR BLD: 18 %
MAGNESIUM SERPL-MCNC: 2.3 MG/DL
MCH RBC QN AUTO: 29.4 PG
MCHC RBC AUTO-ENTMCNC: 32.2 %
MCV RBC AUTO: 91 FL
MONOCYTES # BLD AUTO: ABNORMAL K/UL
MONOCYTES NFR BLD: 8 %
NEUTROPHILS NFR BLD: 73 %
PHOSPHATE SERPL-MCNC: 4.3 MG/DL
PLATELET # BLD AUTO: 324 K/UL
PLATELET BLD QL SMEAR: ABNORMAL
PMV BLD AUTO: 9.1 FL
POTASSIUM SERPL-SCNC: 4.6 MMOL/L
POTASSIUM SERPL-SCNC: 4.6 MMOL/L
RBC # BLD AUTO: 3.5 M/UL
SODIUM SERPL-SCNC: 134 MMOL/L
SODIUM SERPL-SCNC: 135 MMOL/L
WBC # BLD AUTO: 6.58 K/UL

## 2017-02-21 PROCEDURE — 25000003 PHARM REV CODE 250: Performed by: STUDENT IN AN ORGANIZED HEALTH CARE EDUCATION/TRAINING PROGRAM

## 2017-02-21 PROCEDURE — 63600175 PHARM REV CODE 636 W HCPCS: Performed by: SURGERY

## 2017-02-21 PROCEDURE — 80048 BASIC METABOLIC PNL TOTAL CA: CPT

## 2017-02-21 PROCEDURE — 20600001 HC STEP DOWN PRIVATE ROOM

## 2017-02-21 PROCEDURE — 80048 BASIC METABOLIC PNL TOTAL CA: CPT | Mod: 91

## 2017-02-21 PROCEDURE — 84100 ASSAY OF PHOSPHORUS: CPT

## 2017-02-21 PROCEDURE — 27000221 HC OXYGEN, UP TO 24 HOURS

## 2017-02-21 PROCEDURE — 36415 COLL VENOUS BLD VENIPUNCTURE: CPT

## 2017-02-21 PROCEDURE — 83735 ASSAY OF MAGNESIUM: CPT

## 2017-02-21 PROCEDURE — 94640 AIRWAY INHALATION TREATMENT: CPT

## 2017-02-21 PROCEDURE — 94799 UNLISTED PULMONARY SVC/PX: CPT

## 2017-02-21 PROCEDURE — 97530 THERAPEUTIC ACTIVITIES: CPT

## 2017-02-21 PROCEDURE — 25000003 PHARM REV CODE 250: Performed by: SURGERY

## 2017-02-21 PROCEDURE — 94761 N-INVAS EAR/PLS OXIMETRY MLT: CPT

## 2017-02-21 PROCEDURE — 85027 COMPLETE CBC AUTOMATED: CPT

## 2017-02-21 PROCEDURE — 97110 THERAPEUTIC EXERCISES: CPT

## 2017-02-21 PROCEDURE — 85007 BL SMEAR W/DIFF WBC COUNT: CPT

## 2017-02-21 RX ORDER — SODIUM CHLORIDE 9 MG/ML
INJECTION, SOLUTION INTRAVENOUS CONTINUOUS
Status: DISCONTINUED | OUTPATIENT
Start: 2017-02-21 | End: 2017-02-24

## 2017-02-21 RX ADMIN — ATORVASTATIN CALCIUM 10 MG: 10 TABLET, FILM COATED ORAL at 10:02

## 2017-02-21 RX ADMIN — MIRTAZAPINE 30 MG: 30 TABLET, FILM COATED ORAL at 09:02

## 2017-02-21 RX ADMIN — IPRATROPIUM BROMIDE 0.5 MG: 0.5 SOLUTION RESPIRATORY (INHALATION) at 04:02

## 2017-02-21 RX ADMIN — ASPIRIN 81 MG: 81 TABLET, COATED ORAL at 10:02

## 2017-02-21 RX ADMIN — ARIPIPRAZOLE 5 MG: 5 TABLET ORAL at 10:02

## 2017-02-21 RX ADMIN — ESCITALOPRAM OXALATE 10 MG: 10 TABLET ORAL at 10:02

## 2017-02-21 RX ADMIN — HYDROCODONE BITARTRATE AND ACETAMINOPHEN 1 TABLET: 5; 325 TABLET ORAL at 12:02

## 2017-02-21 RX ADMIN — MEMANTINE HYDROCHLORIDE 10 MG: 5 TABLET ORAL at 09:02

## 2017-02-21 RX ADMIN — SODIUM CHLORIDE: 0.9 INJECTION, SOLUTION INTRAVENOUS at 07:02

## 2017-02-21 RX ADMIN — PANTOPRAZOLE SODIUM 40 MG: 40 TABLET, DELAYED RELEASE ORAL at 10:02

## 2017-02-21 RX ADMIN — IPRATROPIUM BROMIDE 0.5 MG: 0.5 SOLUTION RESPIRATORY (INHALATION) at 09:02

## 2017-02-21 RX ADMIN — LISINOPRIL 2.5 MG: 2.5 TABLET ORAL at 10:02

## 2017-02-21 RX ADMIN — MEMANTINE HYDROCHLORIDE 10 MG: 5 TABLET ORAL at 10:02

## 2017-02-21 RX ADMIN — APIXABAN 2.5 MG: 2.5 TABLET, FILM COATED ORAL at 10:02

## 2017-02-21 RX ADMIN — IPRATROPIUM BROMIDE 0.5 MG: 0.5 SOLUTION RESPIRATORY (INHALATION) at 12:02

## 2017-02-21 RX ADMIN — STANDARDIZED SENNA CONCENTRATE AND DOCUSATE SODIUM 1 TABLET: 8.6; 5 TABLET, FILM COATED ORAL at 10:02

## 2017-02-21 RX ADMIN — APIXABAN 2.5 MG: 2.5 TABLET, FILM COATED ORAL at 09:02

## 2017-02-21 NOTE — PLAN OF CARE
Problem: Patient Care Overview  Goal: Plan of Care Review  Outcome: Revised  Plan of care reviewed with the patient and patient's . Patient's  verbalized an understanding of the plan of care. Patient oriented to self only. VSS. Patient tolerating a regular diet. Patient spontaneously voiding. Patient using bedpan at times. Patient also wears a brief due to incontinence. Patient had 1 BM this shift. The bed alarm is on;  at bedside. The  does not help to the pt and not supportive to the pt. The wound vac was with in normal limits during the shift. Patient's pain managed with PRN medications. Patient able to reposition herself in bed.  Patient walked around room this shift. Patient up out of bed to chair this afternoon. Patient free from falls, injury, and/or trauma. [facility]

## 2017-02-21 NOTE — PLAN OF CARE
Problem: Patient Care Overview  Goal: Plan of Care Review  Outcome: Ongoing (interventions implemented as appropriate)  The Pt's blood pressure started out low at start of shift.  The Dr was notified but decided because the map was >60 no action was taken.  The blood pressure continued to improve during the shift.   The wound vac was with in normal limits. The denied pain during the shift.  Breathing was regular equal and unlabored bilaterally on oxygen.   was in the room but had not interaction with the pt at all. The pt was injury free during the night.

## 2017-02-21 NOTE — PLAN OF CARE
Flora followed up with [facility] [phone]. Sw left message for Kortney in admissions, Sw awaiting call back. Flora spoke to Marimar at [facility]. Marimar to review re-sent referral. Referral under review by Shamika. Flora will continue to follow.      Mame Johnston, JOHN [phone]

## 2017-02-21 NOTE — PLAN OF CARE
Ssc sent referral to :      [facility] ( connected)  2) [facility] ( conntected)   3) [facility]  ( connected)       Antonette/kala

## 2017-02-21 NOTE — PLAN OF CARE
· [facility] connected 2/21/2017 8:18:52 AM Under Review: Remote - Local Review  [email or web address]  · [facility] connected 2/21/2017 8:57:41 AM Note: Medically approved, checking financials  [email or web address]  · 2/21/2017 8:06:14 AM Under Review: Remote - Out of Area Review  [email or web address]  [facility] connected left a message on voice mail

## 2017-02-21 NOTE — PROGRESS NOTES
Dr Lara notified of b/p low 83/51 hrt  70 the  was informed pt does not have any fluids running and the pt can swallow but is also a nurse feed. No new orders given at this time.

## 2017-02-21 NOTE — PLAN OF CARE
Problem: Patient Care Overview  Goal: Plan of Care Review  Outcome: Ongoing (interventions implemented as appropriate)  The pt is pleasantly confused but can be reoriented easily.  The

## 2017-02-21 NOTE — PLAN OF CARE
Problem: Patient Care Overview  Goal: Plan of Care Review  Outcome: Revised  Plan of care reviewed with the patient and patient's .  Patient's  verbalized an understanding of the plan of care.  Patient oriented to self only.  VSS. Patient tolerating a regular diet. Patient using bedpan at times. Patient also wears a brief due to incontinence.  The bed alarm is on;  at bedside.  The  does not help to the pt and not supportive to the pt. The wound vac was with in normal limits during the shift. Patient's pain managed with PRN medications.  Patient turned q2.  Patient free from falls, injury, and/or trauma.  [facility]

## 2017-02-21 NOTE — PT/OT/SLP PROGRESS
"Physical Therapy  Treatment    Boubacar Chen   MRN: [id number]   Admitting Diagnosis: Lower limb ischemia s/p L thrombectomy and fasciotomy    PT Received On: 02/21/17  PT Start Time: 1540     PT Stop Time: 1615    PT Total Time (min): 35 min       Billable Minutes:  Therapeutic Activity 20 and Therapeutic Exercise 10 extra time spent in communicating with treatment team    Treatment Type: Treatment  PT/PTA: PT     PTA Visit Number: 2       General Precautions: Standard, fall  Orthopedic Precautions:  (NWB LLE per documentation on initial PT eval.  No documentation elsewhere in chart regarding NWB status)     Do you have any cultural, spiritual, Cheondoism conflicts, given your current situation?: none     Subjective:  Communicated with RN prior to session in patient's room.  RN reports finding patient ambulating in room upon arrival.  Therapist reviewed NWB status with patient and RN.  Nursing unaware of weight bearing restrictions.  Therapist to clarify with MD after therapy session regarding weight bearing restrictions.   Pt with no complaints and oriented only to self.     Objective:   Patient found with: wound vac, peripheral IV with RN,  and sitter at bedside.  Therapist reviewed weight bearing restrictions from initial PT eval with patient and family.  Therapist to confirm with physician following treatment session.  Patient performed supine LE exercises then sat EOB.  Sit to stand training performed EOB with RW and therapist providing tactile feedback to practice NWB.  Therapist then assisted patient with bed to chair transfer.      After treatment session, therapist able to communicate with resident for vascular surgery regarding any weight bearing restrictions.  "Patient OK to weight bear on L LE."  Therapist communicated WBAT status to patient, family and nursing.     Therapist returned to attempt gait with patient after clarity about weight bearing restrictions.  Pt receiving breathing treatment " with respiratory therapy present.  Will attempt on next treatment session.     Functional Mobility:  Bed Mobility:   Supine to Sit: Stand by Assistance  Sit to Supine: Stand by Assistance    Transfers:  Sit <> Stand Assistance:  (8 x from EOB min assist with RW and verbal cues for NWB LLE )  Bed <> Chair Assistance: Moderate Assistance (due to weight bearing status)    Gait:   Gait Distance: NT 2* patient unable to maintain NWB status during session. Therapist returned after speaking with physician to clarity weight bearing restrictions (WBAT).  Pt receiving breathing treatment with respiratory therapy present.  Will attempt on next treatment session.     Therapeutic Activities and Exercises:  Supine bilateral LE exercises performed 15x/ea to progress mobility and facilitate blood flow to BLEs:  ·  ankle pumps  · Heel slides  · SLR  · Hip ABD/ADD    AM-PAC 6 CLICK MOBILITY  How much help from another person does this patient currently need?   1 = Unable, Total/Dependent Assistance  2 = A lot, Maximum/Moderate Assistance  3 = A little, Minimum/Contact Guard/Supervision  4 = None, Modified [place]/Independent    Turning over in bed (including adjusting bedclothes, sheets and blankets)?: 3  Sitting down on and standing up from a chair with arms (e.g., wheelchair, bedside commode, etc.): 3  Moving from lying on back to sitting on the side of the bed?: 3  Moving to and from a bed to a chair (including a wheelchair)?: 2  Need to walk in hospital room?: 1  Climbing 3-5 steps with a railing?: 1  Total Score: 13    AM-PAC Raw Score CMS G-Code Modifier Level of Impairment Assistance   6 % Total / Unable   7 - 9 CM 80 - 100% Maximal Assist   10 - 14 CL 60 - 80% Moderate Assist   15 - 19 CK 40 - 60% Moderate Assist   20 - 22 CJ 20 - 40% Minimal Assist   23 CI 1-20% SBA / CGA   24 CH 0% Independent/ Mod I     Patient left up in chair with all lines intact, call button in reach, RN notified and family present.   Therapist encouraged patient to sit up in chair with LEs elevated for 2 hours.     Assessment:  Boubacar Chen is a 82 y.o. female with a medical diagnosis of Lower limb ischemia and presents with decreased functional mobility and below deficits.  Pt is progressing, but has been limited by weight bearing restrictions on LLE.  Therapist communicated with resident for vascular surgery regarding weight bearing status on L LE.  Pt able to bear weight on LLE (WBAT) per vascular surgery.  Pt expected to progress with additional therapy.     Rehab identified problem list/impairments: Rehab identified problem list/impairments: weakness, impaired endurance, impaired cognition, impaired functional mobilty, gait instability, pain, impaired skin    Rehab potential is good.    Activity tolerance: Good    Discharge recommendations: Discharge Facility/Level Of Care Needs:  (SNF in NH)     Barriers to discharge: Barriers to Discharge: Decreased caregiver support    Equipment recommendations: Equipment Needed After Discharge: none     GOALS:   Physical Therapy Goals        Problem: Physical Therapy Goal    Goal Priority Disciplines Outcome Goal Variances Interventions   Physical Therapy Goal     PT/OT, PT Ongoing (interventions implemented as appropriate)     Description:  Goals to be met by: 3/1/17     Patient will increase functional independence with mobility by performin. Supine to sit with Contact Guard Assistance  2. Sit to stand transfer with Contact Guard Assistance  3. Gait  x 25 feet with Minimal Assistance using Rolling Walker.   4. Lower extremity exercise program x15 reps per handout, with assistance as needed                PLAN:    Patient to be seen 5 x/week  to address the above listed problems via gait training, therapeutic activities, therapeutic exercises  Plan of Care expires: 17  Plan of Care reviewed with: patient, spouse, caregiver         Nikky Francis, PT  2017

## 2017-02-21 NOTE — PLAN OF CARE
Problem: Physical Therapy Goal  Goal: Physical Therapy Goal  Goals to be met by: 3/1/17     Patient will increase functional independence with mobility by performin. Supine to sit with Contact Guard Assistance  2. Sit to stand transfer with Contact Guard Assistance  3. Gait x 25 feet with Minimal Assistance using Rolling Walker.   4. Lower extremity exercise program x15 reps per handout, with assistance as needed   Outcome: Ongoing (interventions implemented as appropriate)  POC and goals reviewed.  POC remains appropriate.  Will progress as tolerated.

## 2017-02-21 NOTE — PROGRESS NOTES
Progress Note  Vascular Surgery    Admit Date: 2/14/2017  Post-operative Day: 7 Days Post-Op  Hospital Day: 8    SUBJECTIVE:     Follow-up For:  Procedure(s) (LRB):  THROMBECTOMY (Left)  FASCIOTOMY (Left)    No acute events overnight. Decreased UOP.    OBJECTIVE:     Vital Signs (Most Recent)  Temp: 96.8 °F (36 °C) (02/21/17 0710)  Pulse: 90 (02/21/17 0924)  Resp: 16 (02/21/17 0924)  BP: 109/60 (02/21/17 0710)  SpO2: 95 % (02/21/17 0710)    Vital Signs Range (Last 24H):  Temp:  [96.5 °F (35.8 °C)-98.4 °F (36.9 °C)]   Pulse:  [66-90]   Resp:  [10-16]   BP: ()/(51-60)   SpO2:  [91 %-95 %]     I & O (Last 24H):    Intake/Output Summary (Last 24 hours) at 02/21/17 0946  Last data filed at 02/21/17 0717   Gross per 24 hour   Intake              350 ml   Output              300 ml   Net               50 ml     Physical Exam:  General: well developed, well nourished, appears stated age, no distress  Head: normocephalic, atraumatic  Eyes:  PERRL, EOMI  Lungs:  normal respiratory effort  Heart: normal sinus  Extremities: left lower extremity wound vac to lateral incision, medial incision of fasciotomy closed; improved edema; groin incision c/d/i  Vascular: LLE: 2+DP; RLE: 2+ PT    CBC:     Recent Labs  Lab 02/21/17  0342   WBC 6.58   RBC 3.50*   HGB 10.3*   HCT 32.0*      MCV 91   MCH 29.4   MCHC 32.2     CMP:   Recent Labs  Lab 02/15/17  0410  02/21/17  0342   *  < > 98   CALCIUM 8.3*  < > 8.5*   ALBUMIN 2.6*  --   --    PROT 5.9*  --   --      < > 134*   K 4.6  < > 4.6   CO2 23  < > 26     < > 101   BUN 20  < > 25*   CREATININE 1.0  < > 1.5*   ALKPHOS 88  --   --    ALT 21  --   --    AST 81*  --   --    BILITOT 0.7  --   --    < > = values in this interval not displayed.  Coagulation:     Recent Labs  Lab 02/15/17  0410   INR 1.0   APTT 25.9       ASSESSMENT/PLAN:     Assessment: 81 yo F with dementia, HTN, paroxysmal atrial fibrillation with left common femoral,popliteal embolus s/p  left  femoral embolectomy of CFA, PFA and SFA with popliteal and tibial embolectomy and LLE fasciotomy.    Plan:   Neuro: Continue pain control with po pain medication   + baseline dementia, oriented to person => home medications resumed  Pulm: Saturating well on 2L NC; continue pulmonary toilet  Cardiovascular: CARMEN score of 6; on metoprolol; lisinopril   - Echo 2/15/17 noted EF of 20%   GI: cardiac diet as tolerated   : incontinent.   FEN: replace electrolytes as needed  Gentle hydration for RAAD. Patient a nurse assist for PO intake.  Heme/ID: Continue eliquis 2.5 mg po bid  PT/OT to evaluate and treat  CM: DC planning, stable for discharge to SNF     Amelie Diego MD, PGY-1  General Surgery  [phone]

## 2017-02-21 NOTE — PLAN OF CARE
Flora followed up with referred to facilities. Flora contacted [facility] [place] [phone]. Flora spoke to Kortney in admissions, Pt has been accepted medically, acceptance pending financials. [phone] spoke to Marimar, who stated that Pt referral has not yet been received. Sw re-sent referral via [facility]. [phone] [place] [facility] for Parul Adams in admissions. Flora will continue to follow.      Mame Johnston, JOHN [phone]

## 2017-02-21 NOTE — PLAN OF CARE
Rec several voice messages from pt's daughter Tali, CM spoke to Tali and explained why it is not possible for pt to return to assisted living ( does not live with pt).  Tali verbalized understanding and stated she had rec a call from one of the NH and is on her way to see it now.  CM updated Brittney CHANG on my conversation with dtr, CM will continue to follow for needs.    Mildred Rubio RN, CCM [facility]  SICU/ENT/Vascular Surgery  Ext [phone]

## 2017-02-22 LAB
ANION GAP SERPL CALC-SCNC: 6 MMOL/L
ANISOCYTOSIS BLD QL SMEAR: SLIGHT
BASOPHILS NFR BLD: 0 %
BUN SERPL-MCNC: 19 MG/DL
CALCIUM SERPL-MCNC: 8.3 MG/DL
CHLORIDE SERPL-SCNC: 104 MMOL/L
CO2 SERPL-SCNC: 27 MMOL/L
CREAT SERPL-MCNC: 1.1 MG/DL
DIFFERENTIAL METHOD: ABNORMAL
EOSINOPHIL NFR BLD: 4 %
ERYTHROCYTE [DISTWIDTH] IN BLOOD BY AUTOMATED COUNT: 13.7 %
EST. GFR  (AFRICAN AMERICAN): 54 ML/MIN/1.73 M^2
EST. GFR  (NON AFRICAN AMERICAN): 46.9 ML/MIN/1.73 M^2
GLUCOSE SERPL-MCNC: 89 MG/DL
HCT VFR BLD AUTO: 29.7 %
HGB BLD-MCNC: 9.5 G/DL
LYMPHOCYTES NFR BLD: 9 %
MAGNESIUM SERPL-MCNC: 1.5 MG/DL
MCH RBC QN AUTO: 29.1 PG
MCHC RBC AUTO-ENTMCNC: 32 %
MCV RBC AUTO: 91 FL
MONOCYTES NFR BLD: 3 %
MYELOCYTES NFR BLD MANUAL: 1 %
NEUTROPHILS NFR BLD: 83 %
PHOSPHATE SERPL-MCNC: 3 MG/DL
PLATELET # BLD AUTO: 297 K/UL
PLATELET BLD QL SMEAR: ABNORMAL
PMV BLD AUTO: 8.7 FL
POLYCHROMASIA BLD QL SMEAR: ABNORMAL
POTASSIUM SERPL-SCNC: 4.7 MMOL/L
RBC # BLD AUTO: 3.26 M/UL
SODIUM SERPL-SCNC: 137 MMOL/L
WBC # BLD AUTO: 5.32 K/UL

## 2017-02-22 PROCEDURE — 83735 ASSAY OF MAGNESIUM: CPT

## 2017-02-22 PROCEDURE — 25000003 PHARM REV CODE 250: Performed by: SURGERY

## 2017-02-22 PROCEDURE — 94761 N-INVAS EAR/PLS OXIMETRY MLT: CPT

## 2017-02-22 PROCEDURE — 97530 THERAPEUTIC ACTIVITIES: CPT

## 2017-02-22 PROCEDURE — 63600175 PHARM REV CODE 636 W HCPCS: Performed by: SURGERY

## 2017-02-22 PROCEDURE — 94640 AIRWAY INHALATION TREATMENT: CPT

## 2017-02-22 PROCEDURE — 97116 GAIT TRAINING THERAPY: CPT

## 2017-02-22 PROCEDURE — 85007 BL SMEAR W/DIFF WBC COUNT: CPT

## 2017-02-22 PROCEDURE — 27000221 HC OXYGEN, UP TO 24 HOURS

## 2017-02-22 PROCEDURE — 80048 BASIC METABOLIC PNL TOTAL CA: CPT

## 2017-02-22 PROCEDURE — 20600001 HC STEP DOWN PRIVATE ROOM

## 2017-02-22 PROCEDURE — 85027 COMPLETE CBC AUTOMATED: CPT

## 2017-02-22 PROCEDURE — 84100 ASSAY OF PHOSPHORUS: CPT

## 2017-02-22 PROCEDURE — 36415 COLL VENOUS BLD VENIPUNCTURE: CPT

## 2017-02-22 RX ADMIN — MAGNESIUM SULFATE HEPTAHYDRATE 3 G: 500 INJECTION, SOLUTION INTRAMUSCULAR; INTRAVENOUS at 09:02

## 2017-02-22 RX ADMIN — APIXABAN 2.5 MG: 2.5 TABLET, FILM COATED ORAL at 09:02

## 2017-02-22 RX ADMIN — PANTOPRAZOLE SODIUM 40 MG: 40 TABLET, DELAYED RELEASE ORAL at 09:02

## 2017-02-22 RX ADMIN — ESCITALOPRAM OXALATE 10 MG: 10 TABLET ORAL at 09:02

## 2017-02-22 RX ADMIN — IPRATROPIUM BROMIDE 0.5 MG: 0.5 SOLUTION RESPIRATORY (INHALATION) at 04:02

## 2017-02-22 RX ADMIN — METOPROLOL TARTRATE 50 MG: 50 TABLET, FILM COATED ORAL at 09:02

## 2017-02-22 RX ADMIN — LISINOPRIL 2.5 MG: 2.5 TABLET ORAL at 09:02

## 2017-02-22 RX ADMIN — IPRATROPIUM BROMIDE 0.5 MG: 0.5 SOLUTION RESPIRATORY (INHALATION) at 11:02

## 2017-02-22 RX ADMIN — IPRATROPIUM BROMIDE 0.5 MG: 0.5 SOLUTION RESPIRATORY (INHALATION) at 12:02

## 2017-02-22 RX ADMIN — MEMANTINE HYDROCHLORIDE 10 MG: 5 TABLET ORAL at 09:02

## 2017-02-22 RX ADMIN — ARIPIPRAZOLE 5 MG: 5 TABLET ORAL at 09:02

## 2017-02-22 RX ADMIN — MIRTAZAPINE 30 MG: 30 TABLET, FILM COATED ORAL at 09:02

## 2017-02-22 RX ADMIN — ASPIRIN 81 MG: 81 TABLET, COATED ORAL at 09:02

## 2017-02-22 RX ADMIN — STANDARDIZED SENNA CONCENTRATE AND DOCUSATE SODIUM 1 TABLET: 8.6; 5 TABLET, FILM COATED ORAL at 09:02

## 2017-02-22 RX ADMIN — IPRATROPIUM BROMIDE 0.5 MG: 0.5 SOLUTION RESPIRATORY (INHALATION) at 08:02

## 2017-02-22 RX ADMIN — ATORVASTATIN CALCIUM 10 MG: 10 TABLET, FILM COATED ORAL at 09:02

## 2017-02-22 NOTE — PLAN OF CARE
Sw received call from RN stating that Pt's  informed RN that Pt has been accepted to [facility] and will d/c there today. Sw unaware of any acceptance at that facility, as no referral has been sent to that facility. Sw contacted [facility]. [facility] admissions rep Joyce not available at time of call. Sw left message for Joyce and requested call back.    Flora contacted Pt room to clarify. Pt had no knowledge of potential d/c to [facility]. Sw contacted Pt's daughter Tali stated family would like to send referrals to [facility] and [facility]. Sw sent referrals to both via [facility]. Flora canceled referrals to all [facility] locations. Sw informed Pt's daughter that Pt will need to be accepted by facilities, and an open bed does not guarantee acceptance, as Pt's  was under the impression she already had been.     Mame Johnston, SW [phone]

## 2017-02-22 NOTE — PLAN OF CARE
Problem: Physical Therapy Goal  Goal: Physical Therapy Goal  Goals to be met by: 3/1/17     Patient will increase functional independence with mobility by performin. Supine to sit with Contact Guard Assistance-met  2. Sit to stand transfer with Contact Guard Assistance  3. Gait x 25 feet with Minimal Assistance using Rolling Walker.   4. Lower extremity exercise program x15 reps per handout, with assistance as needed   Pt progressing towards goals. continue with PT POC.Goals remain appropriate.     Param Acevedo PTA  2017

## 2017-02-22 NOTE — PLAN OF CARE
"Flora followed up with Kortney at [facility] [phone]. Kortney stated Pt has been accepted and is now just waiting on BCBS auth. Kortney to inquire about ETA of auth and follow up with Flora. Flora will continue to follow.      UPDATE 11:15 AM   Flora received call from Kortney at [facility]. Kortney stated auth has not yet been submitted due to "financial issues." Kortney stated Pt's family is in disagreement about d/c plan. Pt's daughters would like Pt to go to SNF and then long term NH, and Pt's  would like to have Pt return to the assisted Living Facility, [facility]. Kortney stated she was informed that Pt's duaghters are paying for [facility] out-of-pocket, while Pt's  recieves both his and Pt's SS checks.     Flora called Pt's daughter Tali to clarify situation. Tali stated She viewed [facility] yesterday and was not happy with the facility. Pt's daughter is viewing [facility] and [facility] today. Flora informed Pt's daughter that Pt has been medically stable for days and her d/c cannot continue to delay. Pt's daughter verbalized understanding, but stated she had not been informed until now of Pt's medical stability for d/c. Pt's daughter stated she would view both facilities and follow up with Flora regarding final choice of where family would like Pt to d/c to.     Pt's daughter also informed Flora that she has removed all of Pt's belongings from [facility], as family can no longer afford $6,000+ monthly fees. Flora sent clinicals to [facility] via [facility]. Flora paged MD to inform of above. Flora will follow up with Pt's daughter once SNF choices are known.     Mame Johnston, JOHN [phone]   "

## 2017-02-22 NOTE — PT/OT/SLP PROGRESS
Physical Therapy  Treatment    Buobacar Chen   MRN: [id number]   Admitting Diagnosis: Lower limb ischemia    PT Received On: 17  PT Start Time: 1205     PT Stop Time: 1229    PT Total Time (min): 24 min       Billable Minutes:  Gait [id number] and Therapeutic Activity 12    Treatment Type: Treatment  PT/PTA: PTA     PTA Visit Number: 1       General Precautions: Standard, fall  Orthopedic Precautions: LLE weight bearing as tolerated   Braces: N/A    Do you have any cultural, spiritual, Pentecostalism conflicts, given your current situation?: none     Subjective:  Communicated with RN prior to session.  My leg hurts when I put weight on it    Pain Ratin/10  Location - Side: Left  Location - Orientation: lower  Location: leg  Pain Addressed: Pre-medicate for activity, Reposition  Pain Rating Post-Intervention: 4/10    Objective:   Patient found with: wound vac, peripheral IV    Functional Mobility:  Bed Mobility:   Supine to Sit: Stand by Assistance, Contact Guard Assistance    Transfers:  Sit <> Stand Assistance: Minimum Assistance ( x 3 trials vcs hand placement. )  Sit <> Stand Assistive Device: Rolling Walker    Gait:   Gait Distance: 18 ft with vcs for posture,safety and sequencing  Assistance 1: Minimum assistance  Gait Assistive Device: Rolling walker  Gait Pattern: swing-to gait  Gait Deviation(s): decreased abhi, decreased step length, decreased velocity of limb motion, decreased stride length, decreased weight-shifting ability, decreased toe-to-floor clearance, forward lean    Therapeutic Activities and Exercises:  Patient performed therex X 15 reps seated in bedside chair B LE AROM AP, LAQ   White board updated    AM-PAC 6 CLICK MOBILITY  How much help from another person does this patient currently need?   1 = Unable, Total/Dependent Assistance  2 = A lot, Maximum/Moderate Assistance  3 = A little, Minimum/Contact Guard/Supervision  4 = None, Modified [place]/Independent    Turning over in  bed (including adjusting bedclothes, sheets and blankets)?: 3  Sitting down on and standing up from a chair with arms (e.g., wheelchair, bedside commode, etc.): 3  Moving from lying on back to sitting on the side of the bed?: 3  Moving to and from a bed to a chair (including a wheelchair)?: 3  Need to walk in hospital room?: 3  Climbing 3-5 steps with a railing?: 1  Total Score: 16    AM-PAC Raw Score CMS G-Code Modifier Level of Impairment Assistance   6 % Total / Unable   7 - 9 CM 80 - 100% Maximal Assist   10 - 14 CL 60 - 80% Moderate Assist   15 - 19 CK 40 - 60% Moderate Assist   20 - 22 CJ 20 - 40% Minimal Assist   23 CI 1-20% SBA / CGA   24 CH 0% Independent/ Mod I     Patient left up in chair with all lines intact, call button in reach, nsg notified and sitter and spouse present.    Assessment:  Boubacar Chen is a 82 y.o. female with a medical diagnosis of Lower limb ischemia and presents with continued deficits as listed below.  Pt Progressing with PT Intervention. Pt able to perfom gait this date however limited due to pain and fatigue. Pt would continue to benefit from skilled PT to address overall functional mobility and goals. Goals remain appropriate.    Rehab identified problem list/impairments: Rehab identified problem list/impairments: weakness, impaired endurance, impaired self care skills, impaired functional mobilty, gait instability, impaired balance, pain, decreased lower extremity function    Rehab potential is good.    Activity tolerance: Good    Discharge recommendations: Discharge Facility/Level Of Care Needs:  (SNF in NH)     Barriers to discharge: Barriers to Discharge: Decreased caregiver support    Equipment recommendations: Equipment Needed After Discharge: none     GOALS:   Physical Therapy Goals        Problem: Physical Therapy Goal    Goal Priority Disciplines Outcome Goal Variances Interventions   Physical Therapy Goal     PT/OT, PT Ongoing (interventions implemented  as appropriate)     Description:  Goals to be met by: 3/1/17     Patient will increase functional independence with mobility by performin. Supine to sit with Contact Guard Assistance-met  2. Sit to stand transfer with Contact Guard Assistance  3. Gait  x 25 feet with Minimal Assistance using Rolling Walker.   4. Lower extremity exercise program x15 reps per handout, with assistance as needed                 PLAN:    Patient to be seen 5 x/week  to address the above listed problems via gait training, therapeutic activities, therapeutic exercises  Plan of Care expires: 17  Plan of Care reviewed with: patient, spouse         Param ANNE MARIE Curtis, PTA  2017

## 2017-02-22 NOTE — PLAN OF CARE
Problem: Patient Care Overview  Goal: Plan of Care Review  Outcome: Ongoing (interventions implemented as appropriate)  POC reviewed with patient who verbalized understanding. VSS, afebrile, no c/o SOB or chest pain. Awake & alert, patient confused- oriented to self only.  at bedside watching wife try to exit bed while bed alarm going off. Sitter at bedside & bed alarm on, remain free of falls and injury. Incontinent, using bedpan. Neurovascular checks to lower extremities done q4. LLE woundvac intact. Tolerating cardiac diet, denies nausea. Denies pain overnight. Telemetry being monitored running NSR. No acute events. No distress noted. Bed in lowest position, call light within reach, frequent rounds made for safety.  WCTM.

## 2017-02-22 NOTE — PLAN OF CARE
Flora contacted Joyce at [facility] [phone]. Joyce stated referral was received, and Joyce has been in communication with Pt's daughters. Joyce stated Pt has been medically accepted, transfer financial acceptance and BCBS auth. Flora will continue to follow.      Mame Johnston, JOHN [phone]

## 2017-02-23 LAB
ANION GAP SERPL CALC-SCNC: 6 MMOL/L
BUN SERPL-MCNC: 18 MG/DL
CALCIUM SERPL-MCNC: 8.5 MG/DL
CHLORIDE SERPL-SCNC: 106 MMOL/L
CO2 SERPL-SCNC: 25 MMOL/L
CREAT SERPL-MCNC: 1 MG/DL
EST. GFR  (AFRICAN AMERICAN): >60 ML/MIN/1.73 M^2
EST. GFR  (NON AFRICAN AMERICAN): 52.6 ML/MIN/1.73 M^2
GLUCOSE SERPL-MCNC: 90 MG/DL
MAGNESIUM SERPL-MCNC: 1.8 MG/DL
PHOSPHATE SERPL-MCNC: 3.2 MG/DL
POTASSIUM SERPL-SCNC: 4.3 MMOL/L
SODIUM SERPL-SCNC: 137 MMOL/L

## 2017-02-23 PROCEDURE — 25000003 PHARM REV CODE 250: Performed by: SURGERY

## 2017-02-23 PROCEDURE — 27000221 HC OXYGEN, UP TO 24 HOURS

## 2017-02-23 PROCEDURE — 83735 ASSAY OF MAGNESIUM: CPT

## 2017-02-23 PROCEDURE — 97110 THERAPEUTIC EXERCISES: CPT

## 2017-02-23 PROCEDURE — 94799 UNLISTED PULMONARY SVC/PX: CPT

## 2017-02-23 PROCEDURE — 97116 GAIT TRAINING THERAPY: CPT

## 2017-02-23 PROCEDURE — 94640 AIRWAY INHALATION TREATMENT: CPT

## 2017-02-23 PROCEDURE — 63600175 PHARM REV CODE 636 W HCPCS: Performed by: SURGERY

## 2017-02-23 PROCEDURE — 84100 ASSAY OF PHOSPHORUS: CPT

## 2017-02-23 PROCEDURE — 20600001 HC STEP DOWN PRIVATE ROOM

## 2017-02-23 PROCEDURE — 80048 BASIC METABOLIC PNL TOTAL CA: CPT

## 2017-02-23 PROCEDURE — 36415 COLL VENOUS BLD VENIPUNCTURE: CPT

## 2017-02-23 RX ORDER — AMOXICILLIN 250 MG
1 CAPSULE ORAL 2 TIMES DAILY
COMMUNITY
Start: 2017-02-23 | End: 2017-05-09

## 2017-02-23 RX ADMIN — MIRTAZAPINE 30 MG: 30 TABLET, FILM COATED ORAL at 09:02

## 2017-02-23 RX ADMIN — CYCLOBENZAPRINE HYDROCHLORIDE 5 MG: 5 TABLET, FILM COATED ORAL at 09:02

## 2017-02-23 RX ADMIN — IPRATROPIUM BROMIDE 0.5 MG: 0.5 SOLUTION RESPIRATORY (INHALATION) at 03:02

## 2017-02-23 RX ADMIN — APIXABAN 2.5 MG: 2.5 TABLET, FILM COATED ORAL at 09:02

## 2017-02-23 RX ADMIN — IPRATROPIUM BROMIDE 0.5 MG: 0.5 SOLUTION RESPIRATORY (INHALATION) at 09:02

## 2017-02-23 RX ADMIN — ATORVASTATIN CALCIUM 10 MG: 10 TABLET, FILM COATED ORAL at 09:02

## 2017-02-23 RX ADMIN — STANDARDIZED SENNA CONCENTRATE AND DOCUSATE SODIUM 1 TABLET: 8.6; 5 TABLET, FILM COATED ORAL at 09:02

## 2017-02-23 RX ADMIN — ASPIRIN 81 MG: 81 TABLET, COATED ORAL at 09:02

## 2017-02-23 RX ADMIN — MEMANTINE HYDROCHLORIDE 10 MG: 5 TABLET ORAL at 09:02

## 2017-02-23 RX ADMIN — METOPROLOL TARTRATE 50 MG: 50 TABLET, FILM COATED ORAL at 09:02

## 2017-02-23 RX ADMIN — ESCITALOPRAM OXALATE 10 MG: 10 TABLET ORAL at 09:02

## 2017-02-23 RX ADMIN — PANTOPRAZOLE SODIUM 40 MG: 40 TABLET, DELAYED RELEASE ORAL at 09:02

## 2017-02-23 NOTE — PLAN OF CARE
Problem: Physical Therapy Goal  Goal: Physical Therapy Goal  Goals to be met by: 3/1/17     Patient will increase functional independence with mobility by performin. Supine to sit with Contact Guard Assistance-met  2. Sit to stand transfer with Contact Guard Assistance-not met  3. Gait x 25 feet with Minimal Assistance using Rolling Walker.-met   4. Lower extremity exercise program x15 reps per handout, with assistance as needed-not met   Pt progressing towards goals. continue with PT POC.Goals remain appropriate.     Param Acevedo PTA  2017

## 2017-02-23 NOTE — PLAN OF CARE
Sw left another VM for Tammi at [facility] following up on referral. Sw awaiting call back.     Mame Johnston, [facility] [place] [phone]

## 2017-02-23 NOTE — PT/OT/SLP PROGRESS
Physical Therapy  Treatment    Boubacar Chen   MRN: [id number]   Admitting Diagnosis: Lower limb ischemia    PT Received On: 17  PT Start Time: 1122     PT Stop Time: 1147    PT Total Time (min): 25 min       Billable Minutes:  Gait [id number] and Therapeutic Exercise 10    Treatment Type: Treatment  PT/PTA: PTA     PTA Visit Number: 2       General Precautions: Standard, fall  Orthopedic Precautions: LLE weight bearing as tolerated   Braces: N/A    Do you have any cultural, spiritual, Sabianist conflicts, given your current situation?: none     Subjective:  Communicated with RN prior to session.  I want to move more    Pain Ratin/10  Location - Side: Left  Location - Orientation: lower  Location: leg  Pain Addressed: Pre-medicate for activity, Reposition  Pain Rating Post-Intervention: 3/10    Objective:   Patient found with: wound vac, peripheral IV    Functional Mobility:  Bed Mobility:   Supine to Sit: Stand by Assistance    Transfers:  Sit <> Stand Assistance: Contact Guard Assistance, Minimum Assistance  Sit <> Stand Assistive Device: Rolling Walker (vcs for hand placement and safety)    Gait:   Gait Distance: 150 ft x 2 with 1 standing rest break between trails. vcs for upright posture,safety,placement of AD. with O2 in tow  Assistance 1: Minimum assistance  Gait Assistive Device: Rolling walker  Gait Pattern: swing-to gait  Gait Deviation(s): decreased abhi, decreased step length, decreased stride length, decreased weight-shifting ability, forward lean, decreased toe-to-floor clearance    Therapeutic Activities and Exercises:  Patient performed therex X 15 reps seated in bedside chair B LE AROM AP, LAQ, Hip Flexion  White board updated  Pt encouraged to ambulate in hallways 3x/day with nursing or family assistance to improve endurance.     AM-PAC 6 CLICK MOBILITY  How much help from another person does this patient currently need?   1 = Unable, Total/Dependent Assistance  2 = A lot,  Maximum/Moderate Assistance  3 = A little, Minimum/Contact Guard/Supervision  4 = None, Modified [place]/Independent    Turning over in bed (including adjusting bedclothes, sheets and blankets)?: 3  Sitting down on and standing up from a chair with arms (e.g., wheelchair, bedside commode, etc.): 3  Moving from lying on back to sitting on the side of the bed?: 3  Moving to and from a bed to a chair (including a wheelchair)?: 3  Need to walk in hospital room?: 3  Climbing 3-5 steps with a railing?: 2  Total Score: 17    AM-PAC Raw Score CMS G-Code Modifier Level of Impairment Assistance   6 % Total / Unable   7 - 9 CM 80 - 100% Maximal Assist   10 - 14 CL 60 - 80% Moderate Assist   15 - 19 CK 40 - 60% Moderate Assist   20 - 22 CJ 20 - 40% Minimal Assist   23 CI 1-20% SBA / CGA   24 CH 0% Independent/ Mod I     Patient left up in chair with all lines intact, call button in reach, nsg notified and spouse present.    Assessment:  Boubacar Chen is a 82 y.o. female with a medical diagnosis of Lower limb ischemia and presents with continued deficits as listed below. Pt  With good effort with  treatment session. Pt Progressing with PT Intervention. Pt Progressing with increase gait distance. Pt would continue to benefit from skilled PT to address overall functional mobility and goals. Goals remain appropriate.    Rehab identified problem list/impairments: Rehab identified problem list/impairments: weakness, impaired endurance, impaired self care skills, impaired functional mobilty, gait instability, impaired balance, pain, decreased lower extremity function    Rehab potential is good.    Activity tolerance: Good    Discharge recommendations: Discharge Facility/Level Of Care Needs:  (SNF in NH)     Barriers to discharge: Barriers to Discharge: Decreased caregiver support    Equipment recommendations: Equipment Needed After Discharge:  (TBD)     GOALS:   Physical Therapy Goals        Problem: Physical  Therapy Goal    Goal Priority Disciplines Outcome Goal Variances Interventions   Physical Therapy Goal     PT/OT, PT Ongoing (interventions implemented as appropriate)     Description:  Goals to be met by: 3/1/17     Patient will increase functional independence with mobility by performin. Supine to sit with Contact Guard Assistance-met  2. Sit to stand transfer with Contact Guard Assistance-not met  3. Gait  x 25 feet with Minimal Assistance using Rolling Walker.-met   4. Lower extremity exercise program x15 reps per handout, with assistance as needed-not met                  PLAN:    Patient to be seen 5 x/week  to address the above listed problems via gait training, therapeutic activities, therapeutic exercises  Plan of Care expires: 17  Plan of Care reviewed with: patient, spouse         Param Acevedo, PTA  2017

## 2017-02-23 NOTE — PLAN OF CARE
Flora completed LOCET with Office of Long Term Care. Flora will retrieve signed PASRR from [place] and send completed PAS to the state to retrieve 142 for NH admission.     [facility]     Mame Johnston LMSW [id number]

## 2017-02-23 NOTE — PROGRESS NOTES
Progress Note  Vascular Surgery    Admit Date: 2/14/2017  Post-operative Day: 9 Days Post-Op  Hospital Day: 10    SUBJECTIVE:     Follow-up For:  Procedure(s) (LRB):  THROMBECTOMY (Left)  FASCIOTOMY (Left)    No acute events overnight. Wound vac changed yesterday. Awaiting placement.    OBJECTIVE:     Vital Signs (Most Recent)  Temp: 96.6 °F (35.9 °C) (02/23/17 0420)  Pulse: 80 (02/23/17 0500)  Resp: 16 (02/23/17 0420)  BP: (!) 102/57 (02/23/17 0420)  SpO2: 97 % (02/23/17 0420)    Vital Signs Range (Last 24H):  Temp:  [96.2 °F (35.7 °C)-98.8 °F (37.1 °C)]   Pulse:  []   Resp:  [16-20]   BP: ()/(50-57)   SpO2:  [95 %-99 %]     I & O (Last 24H):    Intake/Output Summary (Last 24 hours) at 02/23/17 0714  Last data filed at 02/23/17 0600   Gross per 24 hour   Intake             3020 ml   Output             1150 ml   Net             1870 ml     Physical Exam:  General: well developed, well nourished, appears stated age, no distress  Head: normocephalic, atraumatic  Eyes:  PERRL, EOMI  Lungs:  normal respiratory effort  Heart: normal sinus  Extremities: left lower extremity wound vac to lateral incision, medial incision of fasciotomy closed; improved edema; groin incision c/d/i  Vascular: LLE: 2+DP; RLE: 2+ PT    CBC:     Recent Labs  Lab 02/22/17  0417   WBC 5.32   RBC 3.26*   HGB 9.5*   HCT 29.7*      MCV 91   MCH 29.1   MCHC 32.0     CMP:     Recent Labs  Lab 02/23/17  0448   GLU 90   CALCIUM 8.5*      K 4.3   CO2 25      BUN 18   CREATININE 1.0     Coagulation:   No results for input(s): INR, APTT in the last 168 hours.    Invalid input(s): PT    ASSESSMENT/PLAN:     Assessment: 83 yo F with dementia, HTN, paroxysmal atrial fibrillation with left common femoral,popliteal embolus s/p  left femoral embolectomy of CFA, PFA and SFA with popliteal and tibial embolectomy and LLE fasciotomy.    Plan:   Neuro: Continue pain control with po pain medication   + baseline dementia, oriented to  person => home medications resumed  Pulm: Saturating well on 3L NC; continue pulmonary toilet. Wean O2 as tolerated  Cardiovascular: CARMEN score of 6; on metoprolol; lisinopril   - Echo 2/15/17 noted EF of 20%   GI: cardiac diet as tolerated   : incontinent.   FEN: replace electrolytes as needed  Gentle hydration for RAAD- Cr back to baseline. Patient a nurse assist for PO intake.  Heme/ID: Continue eliquis 2.5 mg po bid  PT/OT to evaluate and treat  CM: DC planning, stable for discharge to SNF     Amelie Diego MD, PGY-1  General Surgery  [phone]

## 2017-02-23 NOTE — PLAN OF CARE
Flora followed up with Jessica in admissions at [facility] [phone]. Jessica stated they accepted Pt, but they are not in network for Pt's insurance. Flora informed that Pt's insurance only covers hospital based SNFs. Flora informed ADRIANA Harrsi, Pt's , and Pt's daughter Tali (via voicemail). Flora requested [facility] referral from LAURA Boswell. Flora contacted Tammi at [facility] for her informing her of Pt readiness for d/c. Flora requested that Pt is assessed for admission. If Pt requires longer than 2 weeks of SNF we can request a 1 time contract with [facility] for long term SNF stay at non hospital SNF. Flora will continue to follow.    Mame Johnston, JOHN [id number]

## 2017-02-23 NOTE — PLAN OF CARE
[facility]     Department of Hospital Medicine     [address] [place] [address]     [phone] after hours  [phone] fax       NURSING HOME ORDERS    02/23/2017    Admit to Nursing Home:      Skilled Bed                                          Diagnoses:  Active Hospital Problems    Diagnosis  POA    *Lower limb ischemia [I99.8]  Yes    Critical lower limb ischemia [I99.8]  Yes    Lower urinary tract infectious disease [N39.0]  Yes      Resolved Hospital Problems    Diagnosis Date Resolved POA   No resolved problems to display.       Patient is homebound due to:  Lower limb ischemia    Allergies:Review of patient's allergies indicates:  No Known Allergies    Vitals:       Every shift (Skilled Nursing patients)    Diet: Cardiac    Acitivities:      - Up in a chair each morning as tolerated   - Ambulate with assistance to bathroom   - Scheduled walks once each shift (every 8 hours)    LABS:  Per facility protocol   Pre-albumin each month for 3 months     Nursing Precautions:     - Aspiration precautions:             - Total assistance with meals            -  Upright 90 degrees befor during and after meals             -  Suction at bedside          - Fall precautions per nursing home protocol   - Seizure precaution per intermediate protocol   - Decubitus precautions:        -  for positioning   - Pressure reducing foam mattress   - Turn patient every two hours. Use wedge pillows to anchor patient    CONSULTS:    Physical Therapy to evaluate and treat     Occupational Therapy to evaluate and treat     Speech Therapy  to evaluate and treat     Nutrition to evaluate and recommend diet    MISCELLANEOUS CARE:   Routine Skin for Bedridden Patients:  Apply moisture barrier cream to all    skin folds and wet areas in perineal area daily and after baths and                           all bowel movements.    WOUND CARE:  Wound spray or saline for wound  cleaning with all dressing changes.    All wounds to be measured with first dressing changes and every week.    Other Wounds:   Surgical     Location:  Left groin and left lower extremity, medial          Apply the following to wound:             -  Other: dry gauze daily  (frequency)          -  ET Consult    Wound Vac:     Location:  Left lower extremity, lateral           Dressing changes every Monday, Wednesday and Friday, black sponge.        ET Consult        Medications: Discontinue all previous medication orders, if any. See new list below.     Boubacar Chen   Home Medication Instructions DELLA:[id number]    Printed on:02/23/17 9928   Medication Information                      apixaban 2.5 mg Tab  Take 1 tablet (2.5 mg total) by mouth 2 (two) times daily.             aripiprazole (ABILIFY) 10 MG Tab  Take 5 mg by mouth once daily.             aspirin (ECOTRIN) 81 MG EC tablet  Take 1 tablet (81 mg total) by mouth once daily.             atorvastatin (LIPITOR) 10 MG tablet  Take 10 mg by mouth once daily.              butalbital-acetaminophen-caffeine -40 mg (FIORICET) -40 mg per tablet  Take 1 tablet by mouth every 4 (four) hours as needed for Pain.             donepezil (ARICEPT) 10 MG tablet  Take 10 mg by mouth once daily.              escitalopram (LEXAPRO) 10 MG tablet  Take 10 mg by mouth once daily.              ginkgo biloba 60 mg Cap  Take 1 tablet by mouth once daily.             hydrocodone-acetaminophen 5-325mg (NORCO) 5-325 mg per tablet  Take 1 tablet by mouth every 4 (four) hours as needed.             lisinopril (PRINIVIL,ZESTRIL) 2.5 MG tablet  Take 1 tablet (2.5 mg total) by mouth once daily.             metoprolol succinate (TOPROL-XL) 100 MG 24 hr tablet  Take 100 mg by mouth once daily.              mirabegron (MYRBETRIQ) 25 mg Tb24 ER tablet  Take 1 tablet (25 mg total) by mouth once daily.             mirtazapine (REMERON) 30 MG tablet  Take 30 mg by mouth every  evening.             NAMENDA 10 mg Tab  10 mg 2 (two) times daily.              pantoprazole (PROTONIX) 40 MG tablet  Take 1 tablet (40 mg total) by mouth once daily.             senna-docusate 8.6-50 mg (PERICOLACE) 8.6-50 mg per tablet  Take 1 tablet by mouth 2 (two) times daily.             triamterene-hydrochlorothiazide 37.5-25 mg (DYAZIDE) 37.5-25 mg per capsule  Take 1 capsule by mouth every morning.                       _________________________________  Amelie Diego MD  02/23/2017

## 2017-02-23 NOTE — PLAN OF CARE
Problem: Patient Care Overview  Goal: Plan of Care Review  Outcome: Ongoing (interventions implemented as appropriate)  POC reviewed with patient, teaching about use of call bell to exit bed & use bedside commode, acknowledged but not followed throughout shift. VSS, afebrile, no c/o SOB or chest pain. Awake & alert, patient confused- oriented to self only.  at bedside, not interacting/communicating or assisting patient when bed alarm sounding and patient trying to exit bed. Neurovascular checks to lower extremities done q4. LLE woundvac intact. Tolerating cardiac diet, denies nausea. Denies pain overnight. Telemetry being monitored running NSR. No acute events. No distress noted. Bed in lowest position, call light within reach, frequent rounds made for safety. WCTM.

## 2017-02-23 NOTE — PLAN OF CARE
Problem: Patient Care Overview  Goal: Plan of Care Review  Outcome: Revised   and Case Management contacted [facility] for discharge in AM. Awaiting approval from insurance company and acceptance in AM.

## 2017-02-24 LAB
ANION GAP SERPL CALC-SCNC: 10 MMOL/L
BASOPHILS # BLD AUTO: 0.01 K/UL
BASOPHILS NFR BLD: 0.2 %
BNP SERPL-MCNC: 51 PG/ML
BUN SERPL-MCNC: 21 MG/DL
CALCIUM SERPL-MCNC: 8.6 MG/DL
CHLORIDE SERPL-SCNC: 105 MMOL/L
CO2 SERPL-SCNC: 22 MMOL/L
CREAT SERPL-MCNC: 1.1 MG/DL
DIFFERENTIAL METHOD: ABNORMAL
EOSINOPHIL # BLD AUTO: 0.3 K/UL
EOSINOPHIL NFR BLD: 3.8 %
ERYTHROCYTE [DISTWIDTH] IN BLOOD BY AUTOMATED COUNT: 13.5 %
EST. GFR  (AFRICAN AMERICAN): 54 ML/MIN/1.73 M^2
EST. GFR  (NON AFRICAN AMERICAN): 46.9 ML/MIN/1.73 M^2
GLUCOSE SERPL-MCNC: 145 MG/DL
HCT VFR BLD AUTO: 30 %
HGB BLD-MCNC: 9.4 G/DL
LYMPHOCYTES # BLD AUTO: 1.2 K/UL
LYMPHOCYTES NFR BLD: 19 %
MAGNESIUM SERPL-MCNC: 1.3 MG/DL
MCH RBC QN AUTO: 28.4 PG
MCHC RBC AUTO-ENTMCNC: 31.3 %
MCV RBC AUTO: 91 FL
MONOCYTES # BLD AUTO: 0.5 K/UL
MONOCYTES NFR BLD: 7.2 %
NEUTROPHILS # BLD AUTO: 4.5 K/UL
NEUTROPHILS NFR BLD: 69 %
PHOSPHATE SERPL-MCNC: 2.6 MG/DL
PLATELET # BLD AUTO: 343 K/UL
PMV BLD AUTO: 9 FL
POTASSIUM SERPL-SCNC: 4.6 MMOL/L
RBC # BLD AUTO: 3.31 M/UL
SODIUM SERPL-SCNC: 137 MMOL/L
WBC # BLD AUTO: 6.54 K/UL

## 2017-02-24 PROCEDURE — 83880 ASSAY OF NATRIURETIC PEPTIDE: CPT

## 2017-02-24 PROCEDURE — 85025 COMPLETE CBC W/AUTO DIFF WBC: CPT

## 2017-02-24 PROCEDURE — 36415 COLL VENOUS BLD VENIPUNCTURE: CPT

## 2017-02-24 PROCEDURE — 94640 AIRWAY INHALATION TREATMENT: CPT

## 2017-02-24 PROCEDURE — 83735 ASSAY OF MAGNESIUM: CPT

## 2017-02-24 PROCEDURE — 63600175 PHARM REV CODE 636 W HCPCS: Performed by: SURGERY

## 2017-02-24 PROCEDURE — 84100 ASSAY OF PHOSPHORUS: CPT

## 2017-02-24 PROCEDURE — 80048 BASIC METABOLIC PNL TOTAL CA: CPT

## 2017-02-24 PROCEDURE — 25000003 PHARM REV CODE 250: Performed by: SURGERY

## 2017-02-24 PROCEDURE — 20600001 HC STEP DOWN PRIVATE ROOM

## 2017-02-24 PROCEDURE — 94761 N-INVAS EAR/PLS OXIMETRY MLT: CPT

## 2017-02-24 PROCEDURE — 94799 UNLISTED PULMONARY SVC/PX: CPT

## 2017-02-24 RX ORDER — LANOLIN ALCOHOL/MO/W.PET/CERES
800 CREAM (GRAM) TOPICAL 2 TIMES DAILY
Status: DISCONTINUED | OUTPATIENT
Start: 2017-02-24 | End: 2017-02-25

## 2017-02-24 RX ORDER — FUROSEMIDE 10 MG/ML
10 INJECTION INTRAMUSCULAR; INTRAVENOUS ONCE
Status: COMPLETED | OUTPATIENT
Start: 2017-02-24 | End: 2017-02-24

## 2017-02-24 RX ADMIN — IPRATROPIUM BROMIDE 0.5 MG: 0.5 SOLUTION RESPIRATORY (INHALATION) at 07:02

## 2017-02-24 RX ADMIN — MAGNESIUM OXIDE TAB 400 MG (241.3 MG ELEMENTAL MG) 800 MG: 400 (241.3 MG) TAB at 09:02

## 2017-02-24 RX ADMIN — FUROSEMIDE 10 MG: 10 INJECTION, SOLUTION INTRAMUSCULAR; INTRAVENOUS at 12:02

## 2017-02-24 RX ADMIN — IPRATROPIUM BROMIDE 0.5 MG: 0.5 SOLUTION RESPIRATORY (INHALATION) at 03:02

## 2017-02-24 RX ADMIN — MAGNESIUM OXIDE TAB 400 MG (241.3 MG ELEMENTAL MG) 800 MG: 400 (241.3 MG) TAB at 12:02

## 2017-02-24 RX ADMIN — ATORVASTATIN CALCIUM 10 MG: 10 TABLET, FILM COATED ORAL at 08:02

## 2017-02-24 RX ADMIN — APIXABAN 2.5 MG: 2.5 TABLET, FILM COATED ORAL at 08:02

## 2017-02-24 RX ADMIN — ESCITALOPRAM OXALATE 10 MG: 10 TABLET ORAL at 08:02

## 2017-02-24 RX ADMIN — PANTOPRAZOLE SODIUM 40 MG: 40 TABLET, DELAYED RELEASE ORAL at 08:02

## 2017-02-24 RX ADMIN — MEMANTINE HYDROCHLORIDE 10 MG: 5 TABLET ORAL at 09:02

## 2017-02-24 RX ADMIN — MIRTAZAPINE 30 MG: 30 TABLET, FILM COATED ORAL at 09:02

## 2017-02-24 RX ADMIN — APIXABAN 2.5 MG: 2.5 TABLET, FILM COATED ORAL at 09:02

## 2017-02-24 RX ADMIN — IPRATROPIUM BROMIDE 0.5 MG: 0.5 SOLUTION RESPIRATORY (INHALATION) at 12:02

## 2017-02-24 RX ADMIN — MEMANTINE HYDROCHLORIDE 10 MG: 5 TABLET ORAL at 08:02

## 2017-02-24 RX ADMIN — METOPROLOL TARTRATE 50 MG: 50 TABLET, FILM COATED ORAL at 08:02

## 2017-02-24 RX ADMIN — STANDARDIZED SENNA CONCENTRATE AND DOCUSATE SODIUM 1 TABLET: 8.6; 5 TABLET, FILM COATED ORAL at 08:02

## 2017-02-24 RX ADMIN — ASPIRIN 81 MG: 81 TABLET, COATED ORAL at 08:02

## 2017-02-24 RX ADMIN — HYDROMORPHONE HYDROCHLORIDE 0.2 MG: 1 INJECTION, SOLUTION INTRAMUSCULAR; INTRAVENOUS; SUBCUTANEOUS at 02:02

## 2017-02-24 RX ADMIN — LISINOPRIL 2.5 MG: 2.5 TABLET ORAL at 08:02

## 2017-02-24 NOTE — PLAN OF CARE
"Flora directed by Supervisor Lucero to look into alternative hospital based SNFs for placement, particularly [facility] in [place] and [facility] in [place], [place]. Flora contacted [facility], informed them of Pt's needs and readiness for d/c. Flora inquired about bed availability, with goal to d/c today or this weekend. [facility] stated there is no open bed currently, and there is a waitlist. The soonest SNF d/c is Thursday, so unless there is an unexpected d/c prior to that, that would be the first available date for placement at that facility.     Flora contacted [facility] in [place], spoke to Renetta in admissions in the Swing Bed Unit [phone]. Renetta stated Pt could not admit today, and they do not accept over the weekend, and are closed on Tuesday for Mauro Gras. So if Pt were accepted and insurance gave auth in time the earliest date of possible acceptance would be Monday, if a bed is available at that time. Referrals can be faxed to Fax - [phone]. [facility] could not admit any sooner than [facility], and would be much further than Pt's family's preference of where they would like Pt to d/c to    Flora discussed above with Supervisor Lucero. [facility] has expressed lack of knowledge of single case agreement process (due to absence of head of admissions). Flora re-contacted with Kortney at [facility] [phone]. Flora asked if [facility] would consider requesting single case agreement from [facility]. Kortney will pursue contract from [facility]. Flora informed Pt  of [facility]'s offer to do a contract to accept Pt. Pt's  happy to have Pt "d/c anywhere at this point." Flora sent updated clinicals to [facility] so Kortney can pursue the contract ASA. Flora LVM for Pt's daughter. Flora will continue to follow.      Mame Johnston, JOHN [id number]   "

## 2017-02-24 NOTE — PLAN OF CARE
LAURA placed a call to [facility], [phone], to ask them to request a single case agreement from [facility]. LAURA spoke to Jessica, covering for the usual admissions person. I asked her to request and she said that she wasn't familiar and that when her  came back from lunch, she would ask him.

## 2017-02-24 NOTE — PLAN OF CARE
CM called and lvm for  - Gaby [phone]  ext [phone], requesting a single case agreement on NH placement outside a hospital facility, awaiting return call.    Mildred Rubio RN, CCM [facility]  SICU/ENT/Vascular Surgery  Ext [phone]

## 2017-02-24 NOTE — PLAN OF CARE
Flora consulted with other [facility] social workers regarding Pt d/c barrier (BCBS plan only has hospital based SNFs in network). [facility] Sws stated [employer] regularly does single case agreements with NH SNFs, when need is indicated by PT, as Flora had previously thought. PT and OT have both consistently recommended NH based SNF on all eval and treatment notes (including most recent notes on 2/23 and 2/22). Given that Pt meets this criteria, a NH based SNF should be able to get a BCBS single case agreement. Flora contacted [facility] [phone] to request that they contact [facility] for single case agreement. Admissions not available at time of call. Sw will call again after 9 AM when she is available.     UPDATE 9:00 AM   Flora spoke to Tammi at [facility]. Tammi stated Pt meets criteria for short term SNF, even with PT's recs for NH based. However, [facility] cannot accept Pt until Pt has not received IV Lasix for 24 hours, and BP and Pulse are more under control, as Tammi stated Pt's BP is currently low, and Pt's pulse is high. Tammi cannot submit for auth until Pt is medically stable to d/c. Additionally, given that [facility] is accepting Pt, Tammi stated that Pt will no longer be eligible for potential single case agreement. Flora will inform Supervisor Lucero of above information and request guidance for how to expedite d/c given these circumstances.     UPDATE 10:23 AM   Flora informed Supervisor Lucero of above. Flora again contacted [facility] [phone] to request that they contact [facility] for single case agreement. Admissions still unavailable. Flora will continue to try to contact admissions.     Mame Johnston, JOHN [phone]

## 2017-02-24 NOTE — PLAN OF CARE
Problem: Patient Care Overview  Goal: Plan of Care Review  Outcome: Ongoing (interventions implemented as appropriate)  Plan of care reviewed with patient. Patient verbalized understanding.  Patient is AAO and VSS. No complaint of pain during shift.  No complaint of nausea or vomiting. Tolerating some of diet. Voids on own with good output. Wound Vac in place to LLE cont. At 125.  On Cardiac monitoring running NSR. Up to BSC with assist. Free of falls and injury. Will continue to monitor. Juju Torrez RN

## 2017-02-24 NOTE — PROGRESS NOTES
Progress Note  Vascular Surgery    Admit Date: 2/14/2017  Post-operative Day: 10 Days Post-Op  Hospital Day: 11    SUBJECTIVE:     Follow-up For:  Procedure(s) (LRB):  THROMBECTOMY (Left)  FASCIOTOMY (Left)    No acute events overnight. Some tachycardia. Pain controlled.    OBJECTIVE:     Vital Signs (Most Recent)  Temp: 96.7 °F (35.9 °C) (02/24/17 0400)  Pulse: (!) 114 (02/24/17 0726)  Resp: 16 (02/24/17 0726)  BP: 112/67 (02/24/17 0400)  SpO2: (!) 94 % (02/24/17 0726)    Vital Signs Range (Last 24H):  Temp:  [96.3 °F (35.7 °C)-98.3 °F (36.8 °C)]   Pulse:  []   Resp:  [16-18]   BP: ()/(55-67)   SpO2:  [94 %-97 %]     I & O (Last 24H):    Intake/Output Summary (Last 24 hours) at 02/24/17 0802  Last data filed at 02/24/17 0000   Gross per 24 hour   Intake             1480 ml   Output             1051 ml   Net              429 ml     Physical Exam:  General: well developed, well nourished, appears stated age, no distress  Head: normocephalic, atraumatic  Eyes:  PERRL, EOMI  Lungs:  normal respiratory effort  Heart: normal sinus  Extremities: left lower extremity wound vac to lateral incision, medial incision of fasciotomy closed; improved edema; groin incision c/d/i  Vascular: LLE: 2+DP; RLE: 2+ PT    CBC:     Recent Labs  Lab 02/24/17  0510   WBC 6.54   RBC 3.31*   HGB 9.4*   HCT 30.0*      MCV 91   MCH 28.4   MCHC 31.3*     CMP:     Recent Labs  Lab 02/24/17  0510   *   CALCIUM 8.6*      K 4.6   CO2 22*      BUN 21   CREATININE 1.1     Coagulation:   No results for input(s): INR, APTT in the last 168 hours.    Invalid input(s): PT    ASSESSMENT/PLAN:     Assessment: 83 yo F with dementia, HTN, paroxysmal atrial fibrillation with left common femoral,popliteal embolus s/p  left femoral embolectomy of CFA, PFA and SFA with popliteal and tibial embolectomy and LLE fasciotomy.    Plan:   Neuro: Continue pain control with po pain medication   + baseline dementia, oriented to person =>  home medications resumed  Pulm: Saturating well on 3L NC; continue pulmonary toilet. Wean O2 as tolerated  Cardiovascular: CARMEN score of 6; on metoprolol; lisinopril   - Echo 2/15/17 noted EF of 20%   GI: cardiac diet as tolerated   : incontinent.   FEN: replace electrolytes as needed Patient a nurse assist for PO intake.  Heme/ID: Continue eliquis 2.5 mg po bid  PT/OT to evaluate and treat  CM: DC planning, stable for discharge to SNF   Change wound vac today    Amelie Diego MD, PGY-1  General Surgery  [phone]

## 2017-02-24 NOTE — PLAN OF CARE
CM went to pts room to speak with pt and pts  about d/c plan. Pt asleep in bed and pts  asleep at bedside. He inquired about the d/c plan. CM went over plan with . [facility] [employer] and CM to call [employer] to ask and try to expedite. Back up plan for [facility], pt will not be able to go to [facility] today. Mr. Chen concerned about the holiday weekend. CM did agree that it could pose some challenges but that d/c is not impossible. Pts  stated okay and CM did reassure him that we are working to try to expedite the process but a single case agreement is in the hands of the insurance company.

## 2017-02-24 NOTE — PLAN OF CARE
Problem: Physical Therapy Goal  Goal: Physical Therapy Goal  Goals to be met by: 3/1/17     Patient will increase functional independence with mobility by performin. Supine to sit with Contact Guard Assistance-met  2. Sit to stand transfer with Contact Guard Assistance- met  3. Gait x 25 feet with Minimal Assistance using Rolling Walker.-met   4. Lower extremity exercise program x15 reps per handout, with assistance as needed-not met   Pt progressing towards goals. continue with PT POC.Goals remain appropriate.     Param Acevedo PTA  2017

## 2017-02-24 NOTE — PROGRESS NOTES
Progress Note  Vascular Surgery    Admit Date: 2/14/2017  Post-operative Day: 10 Days Post-Op  Hospital Day: 11    SUBJECTIVE:     Follow-up For:  Procedure(s) (LRB):  THROMBECTOMY (Left)  FASCIOTOMY (Left)    No acute events overnight. Awaiting placement.    OBJECTIVE:     Vital Signs (Most Recent)  Temp: 96.7 °F (35.9 °C) (02/24/17 0400)  Pulse: (!) 114 (02/24/17 0726)  Resp: 16 (02/24/17 0726)  BP: 112/67 (02/24/17 0400)  SpO2: (!) 94 % (02/24/17 0726)    Vital Signs Range (Last 24H):  Temp:  [96.3 °F (35.7 °C)-98.3 °F (36.8 °C)]   Pulse:  []   Resp:  [16-18]   BP: ()/(55-67)   SpO2:  [94 %-97 %]     I & O (Last 24H):    Intake/Output Summary (Last 24 hours) at 02/24/17 0824  Last data filed at 02/24/17 0000   Gross per 24 hour   Intake             1480 ml   Output             1051 ml   Net              429 ml     Physical Exam:  General: well developed, well nourished, appears stated age, no distress  Head: normocephalic, atraumatic  Eyes:  PERRL, EOMI  Lungs:  normal respiratory effort  Heart: normal sinus  Extremities: left lower extremity wound vac to lateral incision, medial incision of fasciotomy closed; improved edema though still present; groin incision c/d/i  Vascular: LLE: 2+DP; RLE: 2+ PT    CBC:     Recent Labs  Lab 02/24/17  0510   WBC 6.54   RBC 3.31*   HGB 9.4*   HCT 30.0*      MCV 91   MCH 28.4   MCHC 31.3*     CMP:     Recent Labs  Lab 02/24/17  0510   *   CALCIUM 8.6*      K 4.6   CO2 22*      BUN 21   CREATININE 1.1     Coagulation:   No results for input(s): INR, APTT in the last 168 hours.    Invalid input(s): PT    ASSESSMENT/PLAN:     Assessment: 83 yo F with dementia, HTN, paroxysmal atrial fibrillation with left common femoral,popliteal embolus s/p  left femoral embolectomy of CFA, PFA and SFA with popliteal and tibial embolectomy and LLE fasciotomy.    Plan:   Neuro: Continue pain control with po pain medication   + baseline dementia, oriented to  person => home medications resumed  Pulm: Saturating well on RA; continue pulmonary toilet.   Cardiovascular: CARMEN score of 6; on metoprolol; lisinopril   - Echo 2/15/17 noted EF of 20%   GI: cardiac diet as tolerated   : incontinent.   FEN: replace electrolytes as needed  Gentle hydration for RAAD- Cr back to baseline. Patient a nurse assist for PO intake.  Heme/ID: Continue eliquis 2.5 mg po bid  PT/OT to evaluate and treat  CM: DC planning, stable for discharge to SNF     Jahaiar Florentino MD  PGY-3 General Surgery  Pager: [phone]

## 2017-02-24 NOTE — PT/OT/SLP PROGRESS
Physical Therapy  Treatment    Boubacar Chen   MRN: [id number]   Admitting Diagnosis: Lower limb ischemia    PT Received On: 17  PT Start Time: 927     PT Stop Time: 951    PT Total Time (min): 24 min       Billable Minutes:  Gait [id number] and Therapeutic Exercise 9    Treatment Type: Treatment  PT/PTA: PTA     PTA Visit Number: 3       General Precautions: Standard, fall  Orthopedic Precautions: LLE weight bearing as tolerated   Braces: N/A    Do you have any cultural, spiritual, Nondenominational conflicts, given your current situation?: none     Subjective:  Communicated with RN prior to session.  I am doing better    Pain Ratin/10              Pain Rating Post-Intervention: 0/10    Objective:   Patient found with: wound vac, peripheral IV    Functional Mobility:  Bed Mobility:    sit > supine with SBA for safety    Transfers:  Sit <> Stand Assistance: Contact Guard Assistance  Sit <> Stand Assistive Device: Rolling Walker  SPT bed to chair with RW with CGA  Gait:   Gait Distance: 175 ft x 2 with 1 standing rest break vcs for upright posture and placement of AD  Assistance 1: Contact Guard Assistance, Minimum assistance  Gait Assistive Device: Rolling walker  Gait Pattern: swing-through gait  Gait Deviation(s): decreased abhi, decreased step length, decreased stride length, decreased weight-shifting ability, forward lean, decreased toe-to-floor clearance    Therapeutic Activities and Exercises:   Discussed pt progress   Patient performed therex X 15 reps seated in bedside chair B LE AROM AP, LAQ, Hip Flexion, Hip Abd/Add   White board updated    AM-PAC 6 CLICK MOBILITY  How much help from another person does this patient currently need?   1 = Unable, Total/Dependent Assistance  2 = A lot, Maximum/Moderate Assistance  3 = A little, Minimum/Contact Guard/Supervision  4 = None, Modified [place]/Independent    Turning over in bed (including adjusting bedclothes, sheets and blankets)?: 3  Sitting down  on and standing up from a chair with arms (e.g., wheelchair, bedside commode, etc.): 3  Moving from lying on back to sitting on the side of the bed?: 3  Moving to and from a bed to a chair (including a wheelchair)?: 3  Need to walk in hospital room?: 3  Climbing 3-5 steps with a railing?: 2  Total Score: 17    AM-PAC Raw Score CMS G-Code Modifier Level of Impairment Assistance   6 % Total / Unable   7 - 9 CM 80 - 100% Maximal Assist   10 - 14 CL 60 - 80% Moderate Assist   15 - 19 CK 40 - 60% Moderate Assist   20 - 22 CJ 20 - 40% Minimal Assist   23 CI 1-20% SBA / CGA   24 CH 0% Independent/ Mod I     Patient left supine with all lines intact, call button in reach, nsg notified and spouse present.    Assessment:  Boubacar Chen is a 82 y.o. female with a medical diagnosis of Lower limb ischemia and presents with continued deficits as listed below. Pt continues to remain motivated and cooperative with treatment session. Pt Progressing with PT Intervention.  Pt would continue to benefit from skilled PT to address overall functional mobility and goals. Goals remain appropriate.      Rehab identified problem list/impairments: Rehab identified problem list/impairments: weakness, impaired endurance, impaired self care skills, impaired functional mobilty, gait instability, pain, impaired balance, decreased lower extremity function    Rehab potential is good.    Activity tolerance: Good    Discharge recommendations: Discharge Facility/Level Of Care Needs:  (SNF in NH)     Barriers to discharge: Barriers to Discharge: Decreased caregiver support    Equipment recommendations: Equipment Needed After Discharge:  (TBD)     GOALS:   Physical Therapy Goals        Problem: Physical Therapy Goal    Goal Priority Disciplines Outcome Goal Variances Interventions   Physical Therapy Goal     PT/OT, PT Ongoing (interventions implemented as appropriate)     Description:  Goals to be met by: 3/1/17     Patient will increase  functional independence with mobility by performin. Supine to sit with Contact Guard Assistance-met  2. Sit to stand transfer with Contact Guard Assistance- met  3. Gait  x 25 feet with Minimal Assistance using Rolling Walker.-met   4. Lower extremity exercise program x15 reps per handout, with assistance as needed-not met                   PLAN:    Patient to be seen 5 x/week  to address the above listed problems via gait training, therapeutic activities, therapeutic exercises  Plan of Care expires: 17  Plan of Care reviewed with: patient, spouse         Param Acevedo, PTA  2017

## 2017-02-24 NOTE — PLAN OF CARE
CM left a VM on [facility]'s CM line with request for call back. Unable to speak to a representative.

## 2017-02-25 LAB
ANION GAP SERPL CALC-SCNC: 9 MMOL/L
BUN SERPL-MCNC: 22 MG/DL
CALCIUM SERPL-MCNC: 8.6 MG/DL
CHLORIDE SERPL-SCNC: 103 MMOL/L
CO2 SERPL-SCNC: 27 MMOL/L
CREAT SERPL-MCNC: 1 MG/DL
EST. GFR  (AFRICAN AMERICAN): >60 ML/MIN/1.73 M^2
EST. GFR  (NON AFRICAN AMERICAN): 52.6 ML/MIN/1.73 M^2
GLUCOSE SERPL-MCNC: 105 MG/DL
MAGNESIUM SERPL-MCNC: 1.4 MG/DL
PHOSPHATE SERPL-MCNC: 3.4 MG/DL
POTASSIUM SERPL-SCNC: 4.2 MMOL/L
SODIUM SERPL-SCNC: 139 MMOL/L

## 2017-02-25 PROCEDURE — 36415 COLL VENOUS BLD VENIPUNCTURE: CPT

## 2017-02-25 PROCEDURE — 25000003 PHARM REV CODE 250: Performed by: SURGERY

## 2017-02-25 PROCEDURE — 97535 SELF CARE MNGMENT TRAINING: CPT

## 2017-02-25 PROCEDURE — 20600001 HC STEP DOWN PRIVATE ROOM

## 2017-02-25 PROCEDURE — 84100 ASSAY OF PHOSPHORUS: CPT

## 2017-02-25 PROCEDURE — 63600175 PHARM REV CODE 636 W HCPCS: Performed by: SURGERY

## 2017-02-25 PROCEDURE — 94640 AIRWAY INHALATION TREATMENT: CPT

## 2017-02-25 PROCEDURE — 83735 ASSAY OF MAGNESIUM: CPT

## 2017-02-25 PROCEDURE — 80048 BASIC METABOLIC PNL TOTAL CA: CPT

## 2017-02-25 RX ORDER — LANOLIN ALCOHOL/MO/W.PET/CERES
800 CREAM (GRAM) TOPICAL 2 TIMES DAILY
Status: DISCONTINUED | OUTPATIENT
Start: 2017-02-25 | End: 2017-03-01 | Stop reason: HOSPADM

## 2017-02-25 RX ORDER — IPRATROPIUM BROMIDE 0.5 MG/2.5ML
0.5 SOLUTION RESPIRATORY (INHALATION) EVERY 4 HOURS PRN
Status: DISCONTINUED | OUTPATIENT
Start: 2017-02-25 | End: 2017-03-01 | Stop reason: HOSPADM

## 2017-02-25 RX ADMIN — MEMANTINE HYDROCHLORIDE 10 MG: 5 TABLET ORAL at 09:02

## 2017-02-25 RX ADMIN — ASPIRIN 81 MG: 81 TABLET, COATED ORAL at 08:02

## 2017-02-25 RX ADMIN — METOPROLOL TARTRATE 50 MG: 50 TABLET, FILM COATED ORAL at 09:02

## 2017-02-25 RX ADMIN — METOPROLOL TARTRATE 50 MG: 50 TABLET, FILM COATED ORAL at 08:02

## 2017-02-25 RX ADMIN — STANDARDIZED SENNA CONCENTRATE AND DOCUSATE SODIUM 1 TABLET: 8.6; 5 TABLET, FILM COATED ORAL at 08:02

## 2017-02-25 RX ADMIN — MAGNESIUM OXIDE TAB 400 MG (241.3 MG ELEMENTAL MG) 800 MG: 400 (241.3 MG) TAB at 10:02

## 2017-02-25 RX ADMIN — MIRTAZAPINE 30 MG: 30 TABLET, FILM COATED ORAL at 09:02

## 2017-02-25 RX ADMIN — HYDROCODONE BITARTRATE AND ACETAMINOPHEN 1 TABLET: 5; 325 TABLET ORAL at 08:02

## 2017-02-25 RX ADMIN — LISINOPRIL 2.5 MG: 2.5 TABLET ORAL at 08:02

## 2017-02-25 RX ADMIN — ESCITALOPRAM OXALATE 10 MG: 10 TABLET ORAL at 08:02

## 2017-02-25 RX ADMIN — MAGNESIUM OXIDE TAB 400 MG (241.3 MG ELEMENTAL MG) 800 MG: 400 (241.3 MG) TAB at 09:02

## 2017-02-25 RX ADMIN — MEMANTINE HYDROCHLORIDE 10 MG: 5 TABLET ORAL at 08:02

## 2017-02-25 RX ADMIN — STANDARDIZED SENNA CONCENTRATE AND DOCUSATE SODIUM 1 TABLET: 8.6; 5 TABLET, FILM COATED ORAL at 09:02

## 2017-02-25 RX ADMIN — PANTOPRAZOLE SODIUM 40 MG: 40 TABLET, DELAYED RELEASE ORAL at 08:02

## 2017-02-25 RX ADMIN — ATORVASTATIN CALCIUM 10 MG: 10 TABLET, FILM COATED ORAL at 08:02

## 2017-02-25 RX ADMIN — MAGNESIUM SULFATE HEPTAHYDRATE 3 G: 500 INJECTION, SOLUTION INTRAMUSCULAR; INTRAVENOUS at 10:02

## 2017-02-25 RX ADMIN — IPRATROPIUM BROMIDE 0.5 MG: 0.5 SOLUTION RESPIRATORY (INHALATION) at 12:02

## 2017-02-25 RX ADMIN — APIXABAN 2.5 MG: 2.5 TABLET, FILM COATED ORAL at 08:02

## 2017-02-25 RX ADMIN — APIXABAN 2.5 MG: 2.5 TABLET, FILM COATED ORAL at 09:02

## 2017-02-25 NOTE — PLAN OF CARE
Problem: Patient Care Overview  Goal: Plan of Care Review  Outcome: Ongoing (interventions implemented as appropriate)  POC reviewed with patient, teaching about use of call bell to exit bed & use bedside commode, acknowledged, bed alarm on& in camera room for patient safety. VSS, afebrile, no c/o SOB or chest pain. Awake & alert, patient confused- oriented to self only. Neurovascular checks to lower extremities done q4. LLE woundvac intact. Tolerating cardiac diet, denies nausea. Denies pain overnight. Telemetry being monitored running NSR. No acute events. No distress noted. Bed in lowest position, call light within reach, frequent rounds made for safety. WCTM.

## 2017-02-25 NOTE — PROGRESS NOTES
Progress Note  Vascular Surgery    Admit Date: 2/14/2017  Post-operative Day: 11 Days Post-Op  Hospital Day: 12    SUBJECTIVE:     Follow-up For:  Procedure(s) (LRB):  THROMBECTOMY (Left)  FASCIOTOMY (Left)    No acute events overnight. Awaiting placement.    OBJECTIVE:     Vital Signs (Most Recent)  Temp: 97.5 °F (36.4 °C) (02/25/17 0800)  Pulse: 82 (02/25/17 0800)  Resp: 16 (02/25/17 0800)  BP: (!) 116/56 (02/25/17 0800)  SpO2: 95 % (02/25/17 0800)    Vital Signs Range (Last 24H):  Temp:  [96.3 °F (35.7 °C)-98.7 °F (37.1 °C)]   Pulse:  []   Resp:  [16-18]   BP: ()/(55-69)   SpO2:  [86 %-98 %]     I & O (Last 24H):    Intake/Output Summary (Last 24 hours) at 02/25/17 0856  Last data filed at 02/24/17 2100   Gross per 24 hour   Intake              970 ml   Output              425 ml   Net              545 ml     Physical Exam:  General: well developed, well nourished, appears stated age, no distress  Head: normocephalic, atraumatic  Eyes:  PERRL, EOMI  Lungs:  normal respiratory effort  Heart: normal sinus  Extremities: left lower extremity wound vac to lateral incision, medial incision of fasciotomy closed; improved edema though still present; groin incision c/d/i  Vascular: LLE: 2+DP; RLE: 2+ PT    CBC:     Recent Labs  Lab 02/24/17  0510   WBC 6.54   RBC 3.31*   HGB 9.4*   HCT 30.0*      MCV 91   MCH 28.4   MCHC 31.3*     CMP:     Recent Labs  Lab 02/25/17  0516      CALCIUM 8.6*      K 4.2   CO2 27      BUN 22   CREATININE 1.0     Coagulation:   No results for input(s): INR, APTT in the last 168 hours.    Invalid input(s): PT    ASSESSMENT/PLAN:     Assessment: 83 yo F with dementia, HTN, paroxysmal atrial fibrillation with left common femoral,popliteal embolus s/p  left femoral embolectomy of CFA, PFA and SFA with popliteal and tibial embolectomy and LLE fasciotomy.    Plan:   Neuro: Continue pain control with po pain medication   + baseline dementia, oriented to person =>  home medications resumed  Pulm: Saturating well on RA; continue pulmonary toilet.   Cardiovascular: CARMEN score of 6; on metoprolol; lisinopril   - Echo 2/15/17 noted EF of 20%   GI: cardiac diet as tolerated   : incontinent.   FEN: replace electrolytes as needed  Gentle hydration for RAAD- Cr back to baseline. Patient a nurse assist for PO intake.  Heme/ID: Continue eliquis 2.5 mg po bid  PT/OT to evaluate and treat  CM: DC planning, stable for discharge to SNF     Jahaira Florentino MD  PGY-3 General Surgery  Pager: [phone]

## 2017-02-25 NOTE — PLAN OF CARE
Problem: Occupational Therapy Goal  Goal: Occupational Therapy Goal  Goals to be met by: 2 Weeks (3/11/2017)    Patient will increase functional independence with ADLs by performin. Supine to sit with Supervision.  2. Sit to Stand transfers with Supervision and AD   3. Toilet transfer to bedside commode with Supervision.  4. Grooming while seated with Supervision.  5. UE Dressing with Supervision.  6. LE Dressing with Min A.  7. Pt will perform functional task seated EOB x10 min with SBA.   OT goals updated.  MILANA Esposito  2017

## 2017-02-25 NOTE — PT/OT/SLP PROGRESS
"Occupational Therapy  Treatment    Boubacar Chen   MRN: [id number]   Admitting Diagnosis: Lower limb ischemia    OT Date of Treatment: 02/25/17   OT Start Time: 0750  OT Stop Time: 0813  OT Total Time (min): 23 min    Billable Minutes:  Self Care/Home Management 23    General Precautions: Standard, fall  Orthopedic Precautions:    Braces:           Subjective:  Communicated with RN prior to session.  "I have a splitting headache"    Pain Rating: 10/10        Location:  ("headache")     Pain Rating Post-Intervention: 10/10 (increased LLE pain reported while seated and during scooting and standing)    Objective:  Patient found with: peripheral IV, wound vac, telemetry     Functional Mobility:  Bed Mobility:  Scooting/Bridging: Stand by Assistance  Supine to Sit: Stand by Assistance, WIth side rail (HOB slightly elevated)  Sit to Supine: Stand by Assistance, With side rail (HOB slightly elevated)    Transfers:   Sit <> Stand Assistance: Stand By Assistance (EOB; Pt performed stand scoot towards HOB x3 trials and full sit>stand at EOB x1 trial; pt refused to stand >1x due to severity of headache and increased LLE pain although adhering to WB precautions)  Sit <> Stand Assistive Device: Rolling Walker    Functional Ambulation: pt refused to take steps  During stand/scooting ax, pt would move BLE towards HOB one at a time time for positioning then scoot towards HOB.    Activities of Daily Living:    LE Dressing Level of Assistance: Maximum assistance (Total A to doff/don LLE; SBA to doff, Mod A to don RLE socks)    Grooming Position: Seated  Grooming Level of Assistance: Stand by assistance (wash face and hands with wash cloth)        Therapeutic Activities and Exercises:  Pt educated on safety with daily tasks OOB, and importance of participating in daily ax. Pt whiteboard updated.      AM-PAC 6 CLICK ADL   How much help from another person does this patient currently need?   1 = Unable, Total/Dependent Assistance  2 " = A lot, Maximum/Moderate Assistance  3 = A little, Minimum/Contact Guard/Supervision  4 = None, Modified [place]/Independent    Putting on and taking off regular lower body clothing? : 2  Bathing (including washing, rinsing, drying)?: 2  Toileting, which includes using toilet, bedpan, or urinal? : 2  Putting on and taking off regular upper body clothing?: 3  Taking care of personal grooming such as brushing teeth?: 3  Eating meals?: 3  Total Score: 15     AM-PAC Raw Score CMS G-Code Modifier Level of Impairment Assistance   6 % Total / Unable   7 - 9 CM 80 - 100% Maximal Assist   10 - 14 CL 60 - 80% Moderate Assist   15 - 19 CK 40 - 60% Moderate Assist   20 - 22 CJ 20 - 40% Minimal Assist   23 CI 1-20% SBA / CGA   24 CH 0% Independent/ Mod I     Patient left supine with all lines intact, call button in reach, bed alarm on and RN notified    ASSESSMENT:  Boubacar Chen is a 82 y.o. female with a medical diagnosis of Lower limb ischemia. Pt tolerated session well and put forth good effort to participate with encouragement for daily tasks. Pt presented with decreased (I), endurance, stability and safety for ADLs, self-care and functional mobility. Pt required constant VCs and single step instructions for tasks. Pt will benefit from further OT in order to maximize (I) and safety for functional tasks.      Rehab identified problem list/impairments: Rehab identified problem list/impairments: weakness, impaired functional mobilty, impaired cognition, decreased safety awareness, impaired endurance, gait instability, pain, impaired sensation, impaired balance, impaired self care skills, decreased upper extremity function, decreased lower extremity function    Rehab potential is fair.    Activity tolerance: Good    Discharge recommendations: Discharge Facility/Level Of Care Needs: nursing facility, skilled (SNF in NH)     Barriers to discharge: Barriers to Discharge: Decreased caregiver support    Equipment  recommendations:  (TBD)     GOALS:   Occupational Therapy Goals        Problem: Occupational Therapy Goal    Goal Priority Disciplines Outcome Interventions   Occupational Therapy Goal     OT, PT/OT Ongoing (interventions implemented as appropriate)    Description:  Goals to be met by:  2 Weeks (3/11/2017)    Patient will increase functional independence with ADLs by performin. Supine to sit with Supervision.  2. Sit to Stand transfers with Supervision and AD   3. Toilet transfer to bedside commode with Supervision.  4. Grooming while seated with Supervision.  5. UE Dressing with Supervision.  6. LE Dressing with Min A.  7. Pt will perform functional task seated EOB x10 min with SBA.                   Plan:  Patient to be seen 5 x/week to address the above listed problems via self-care/home management, community/work re-entry, therapeutic activities, therapeutic exercises  Plan of Care expires: 17  Plan of Care reviewed with: patient         MILANA Esposito  2017

## 2017-02-26 LAB
ANION GAP SERPL CALC-SCNC: 6 MMOL/L
BUN SERPL-MCNC: 22 MG/DL
CALCIUM SERPL-MCNC: 8.6 MG/DL
CHLORIDE SERPL-SCNC: 103 MMOL/L
CO2 SERPL-SCNC: 29 MMOL/L
CREAT SERPL-MCNC: 0.9 MG/DL
EST. GFR  (AFRICAN AMERICAN): >60 ML/MIN/1.73 M^2
EST. GFR  (NON AFRICAN AMERICAN): 59.7 ML/MIN/1.73 M^2
GLUCOSE SERPL-MCNC: 103 MG/DL
MAGNESIUM SERPL-MCNC: 1.8 MG/DL
PHOSPHATE SERPL-MCNC: 3.3 MG/DL
POTASSIUM SERPL-SCNC: 4.2 MMOL/L
SODIUM SERPL-SCNC: 138 MMOL/L

## 2017-02-26 PROCEDURE — 84100 ASSAY OF PHOSPHORUS: CPT

## 2017-02-26 PROCEDURE — 97530 THERAPEUTIC ACTIVITIES: CPT

## 2017-02-26 PROCEDURE — 80048 BASIC METABOLIC PNL TOTAL CA: CPT

## 2017-02-26 PROCEDURE — 36415 COLL VENOUS BLD VENIPUNCTURE: CPT

## 2017-02-26 PROCEDURE — 25000003 PHARM REV CODE 250: Performed by: SURGERY

## 2017-02-26 PROCEDURE — 63600175 PHARM REV CODE 636 W HCPCS: Performed by: SURGERY

## 2017-02-26 PROCEDURE — 83735 ASSAY OF MAGNESIUM: CPT

## 2017-02-26 PROCEDURE — 20600001 HC STEP DOWN PRIVATE ROOM

## 2017-02-26 PROCEDURE — 97110 THERAPEUTIC EXERCISES: CPT

## 2017-02-26 RX ORDER — MAGNESIUM SULFATE HEPTAHYDRATE 40 MG/ML
2 INJECTION, SOLUTION INTRAVENOUS ONCE
Status: COMPLETED | OUTPATIENT
Start: 2017-02-26 | End: 2017-02-26

## 2017-02-26 RX ADMIN — METOPROLOL TARTRATE 50 MG: 50 TABLET, FILM COATED ORAL at 08:02

## 2017-02-26 RX ADMIN — ESCITALOPRAM OXALATE 10 MG: 10 TABLET ORAL at 08:02

## 2017-02-26 RX ADMIN — ASPIRIN 81 MG: 81 TABLET, COATED ORAL at 08:02

## 2017-02-26 RX ADMIN — MAGNESIUM OXIDE TAB 400 MG (241.3 MG ELEMENTAL MG) 800 MG: 400 (241.3 MG) TAB at 08:02

## 2017-02-26 RX ADMIN — METOPROLOL TARTRATE 50 MG: 50 TABLET, FILM COATED ORAL at 09:02

## 2017-02-26 RX ADMIN — MAGNESIUM OXIDE TAB 400 MG (241.3 MG ELEMENTAL MG) 800 MG: 400 (241.3 MG) TAB at 09:02

## 2017-02-26 RX ADMIN — ATORVASTATIN CALCIUM 10 MG: 10 TABLET, FILM COATED ORAL at 08:02

## 2017-02-26 RX ADMIN — APIXABAN 2.5 MG: 2.5 TABLET, FILM COATED ORAL at 09:02

## 2017-02-26 RX ADMIN — MEMANTINE HYDROCHLORIDE 10 MG: 5 TABLET ORAL at 09:02

## 2017-02-26 RX ADMIN — MIRTAZAPINE 30 MG: 30 TABLET, FILM COATED ORAL at 09:02

## 2017-02-26 RX ADMIN — STANDARDIZED SENNA CONCENTRATE AND DOCUSATE SODIUM 1 TABLET: 8.6; 5 TABLET, FILM COATED ORAL at 09:02

## 2017-02-26 RX ADMIN — STANDARDIZED SENNA CONCENTRATE AND DOCUSATE SODIUM 1 TABLET: 8.6; 5 TABLET, FILM COATED ORAL at 08:02

## 2017-02-26 RX ADMIN — MEMANTINE HYDROCHLORIDE 10 MG: 5 TABLET ORAL at 08:02

## 2017-02-26 RX ADMIN — PANTOPRAZOLE SODIUM 40 MG: 40 TABLET, DELAYED RELEASE ORAL at 08:02

## 2017-02-26 RX ADMIN — MAGNESIUM SULFATE IN WATER 2 G: 40 INJECTION, SOLUTION INTRAVENOUS at 09:02

## 2017-02-26 RX ADMIN — LISINOPRIL 2.5 MG: 2.5 TABLET ORAL at 08:02

## 2017-02-26 NOTE — PT/OT/SLP PROGRESS
"Physical Therapy  Treatment    Boubacar Chen   MRN: [id number]   Admitting Diagnosis: Lower limb ischemia    PT Received On: 17  PT Start Time: 1423     PT Stop Time: 1441    PT Total Time (min): 18 min       Billable Minutes:  Therapeutic Activity 8 and Therapeutic Exercise 10    Treatment Type: Treatment  PT/PTA: PT     PTA Visit Number: 0       General Precautions: Standard, fall, other (see comments) (safety; cardiac; full code; HOB 30; turn q2hrs)  Orthopedic Precautions: LLE weight bearing as tolerated   Braces: N/A    Do you have any cultural, spiritual, Hindu conflicts, given your current situation?: none     Subjective:  Communicated with Nursing, Carolynn, and patient prior to session. Nursing indicated patient medically stable for PT session. Patient issued verbal consent for PT Session.    "I feel good."  "I have to tinkle."    Pain Ratin/10              Pain Rating Post-Intervention: 0/10    Objective:   Patient found with: peripheral IV, wound vac, telemetry (patient resting comfortably in bed supine with HOB elevated;  present; no distress)    Functional Mobility:  8 min of therapeutic activity consisting of:  Bed Mobility:   Rolling/Turning to Left: Supervision  Rolling/Turning Right: Supervision  Scooting/Bridging: Supervision  Supine to Sit: Supervision  Sit to Supine: Supervision    Transfers:  Sit <> Stand Assistance: Contact Guard Assistance, Stand By Assistance  Sit <> Stand Assistive Device: Rolling Walker  Toilet Transfer Technique: Stand Pivot  Toilet Transfer Assistance: Stand By Assistance, Contact Guard Assistance  Toilet Transfer Assistive Device: No Assistive Device    Gait:   Gait Distance: 30' with RW on level CGA; min VC for postural awareness and AD usage; IV pole and wound vac in tow  Assistance 1: Contact Guard Assistance  Gait Assistive Device: Rolling walker  Gait Pattern: 3-point gait  Gait Deviation(s): decreased abhi, increased time in double stance, " decreased step length, decreased stride length, forward lean    Balance:   Static Sit: FAIR: Maintains without assist, but unable to take any challenges   Dynamic Sit: FAIR: Cannot move trunk without losing balance  Static Stand: FAIR: Maintains without assist but unable to take challenges  Dynamic stand: FAIR: Needs CONTACT GUARD during gait     Therapeutic Activities and Exercises:  10 minutes of therapeutic exercises consisting of: B LEs in supine x 10 reps of AP, heelslides, hip abduction/adduction with TKE     AM-PAC 6 CLICK MOBILITY  How much help from another person does this patient currently need?   1 = Unable, Total/Dependent Assistance  2 = A lot, Maximum/Moderate Assistance  3 = A little, Minimum/Contact Guard/Supervision  4 = None, Modified [place]/Independent    Turning over in bed (including adjusting bedclothes, sheets and blankets)?: 3  Sitting down on and standing up from a chair with arms (e.g., wheelchair, bedside commode, etc.): 3  Moving from lying on back to sitting on the side of the bed?: 3  Moving to and from a bed to a chair (including a wheelchair)?: 3  Need to walk in hospital room?: 3  Climbing 3-5 steps with a railing?: 2  Total Score: 17    AM-PAC Raw Score CMS G-Code Modifier Level of Impairment Assistance   6 % Total / Unable   7 - 9 CM 80 - 100% Maximal Assist   10 - 14 CL 60 - 80% Moderate Assist   15 - 19 CK 40 - 60% Moderate Assist   20 - 22 CJ 20 - 40% Minimal Assist   23 CI 1-20% SBA / CGA   24 CH 0% Independent/ Mod I     Patient left supine with HOB elevated with all lines intact, call button in reach, nursing notified,  present and needs in reach, no distress.    Assessment:  Boubacar Chen is a 82 y.o. female with a medical diagnosis of Lower limb ischemia and presents with decreased functional mobility and fall risk. Patient demonstrates improvement in areas of mobility yet continues to require minimal assist for mobility and remains at fall risk.  Patient continues to be good candidate for continued skilled PT services while in acute setting to address problem list below.    Rehab identified problem list/impairments: Rehab identified problem list/impairments: weakness, impaired endurance, impaired self care skills, impaired functional mobilty, gait instability, impaired balance, decreased safety awareness, decreased lower extremity function, impaired cardiopulmonary response to activity, impaired skin    Rehab potential is good.    Activity tolerance: Good    Discharge recommendations: Discharge Facility/Level Of Care Needs: nursing facility, skilled     Barriers to discharge: Barriers to Discharge: Decreased caregiver support    Equipment recommendations: Equipment Needed After Discharge:  (to be determined pending progress)     GOALS:   Physical Therapy Goals        Problem: Physical Therapy Goal    Goal Priority Disciplines Outcome Goal Variances Interventions   Physical Therapy Goal     PT/OT, PT Ongoing (interventions implemented as appropriate)     Description:  Goals to be met by: 3/1/17     Patient will increase functional independence with mobility by performin. Supine to sit with Contact Guard Assistance-met  2. Sit to stand transfer with Contact Guard Assistance- met  3. Gait  x 25 feet with Minimal Assistance using Rolling Walker.-met   Revised:  4. Sit to/from supine modified independent.  5. Sit to/from stand using RW prn with modified independent.  6. Stand pivot transfer with/without RW modified independent.  7. Gait on level surfaces 200' with RW Spv/SBA.  8. Lower extremity exercise program x15 reps per handout, with assistance as needed-not met                  PLAN:    Patient to be seen 5 x/week  to address the above listed problems via gait training, therapeutic activities, therapeutic exercises, neuromuscular re-education  Plan of Care expires: 17  Plan of Care reviewed with: patient     Mame VIVIENNE Villasenor, PT  2017    0400

## 2017-02-26 NOTE — PLAN OF CARE
Problem: Physical Therapy Goal  Goal: Physical Therapy Goal  Goals to be met by: 3/1/17     Patient will increase functional independence with mobility by performin. Supine to sit with Contact Guard Assistance-met  2. Sit to stand transfer with Contact Guard Assistance- met  3. Gait x 25 feet with Minimal Assistance using Rolling Walker.-met   4. Lower extremity exercise program x15 reps per handout, with assistance as needed-not met   Outcome: Ongoing (interventions implemented as appropriate)  No additional goals met today. Goals modified/updated. Continue PT POC.     Mame Villasenor, PT  2017  [id number]

## 2017-02-26 NOTE — PLAN OF CARE
Problem: Patient Care Overview  Goal: Plan of Care Review  Outcome: Ongoing (interventions implemented as appropriate)  Remained safe throughout shift. Neurovascular checks Q4H. Wound vac to LLE intact at 125 mmHg; noted with serosanguineous drainage. Left groin dressing intact with gauze and tegaderm. Transferring to bedside commode with assist x2. Denies pain. Remained free from falls or injuries with bed alarm on, bed locked, bed in lowest position, and call bell within reach. Spouse at bedside.

## 2017-02-26 NOTE — PROGRESS NOTES
Progress Note  Vascular Surgery    Admit Date: 2/14/2017  Post-operative Day: 12 Days Post-Op  Hospital Day: 13    SUBJECTIVE:     Follow-up For:  Procedure(s) (LRB):  THROMBECTOMY (Left)  FASCIOTOMY (Left)    No acute events overnight. Awaiting placement.    OBJECTIVE:     Vital Signs (Most Recent)  Temp: 98.2 °F (36.8 °C) (02/26/17 0726)  Pulse: 85 (02/26/17 0726)  Resp: 16 (02/26/17 0726)  BP: 109/60 (02/26/17 0726)  SpO2: 99 % (02/26/17 0726)    Vital Signs Range (Last 24H):  Temp:  [97.6 °F (36.4 °C)-98.5 °F (36.9 °C)]   Pulse:  []   Resp:  [14-18]   BP: ()/(52-67)   SpO2:  [94 %-99 %]     I & O (Last 24H):    Intake/Output Summary (Last 24 hours) at 02/26/17 0801  Last data filed at 02/26/17 0400   Gross per 24 hour   Intake              720 ml   Output             1350 ml   Net             -630 ml     Physical Exam:  General: well developed, well nourished, appears stated age, no distress  Head: normocephalic, atraumatic  Eyes:  PERRL, EOMI  Lungs:  normal respiratory effort  Heart: normal sinus  Extremities: left lower extremity wound vac to lateral incision, medial incision of fasciotomy closed; improved edema though still present; groin incision c/d/i  Vascular: LLE: 2+DP; RLE: 2+ PT    CBC:     Recent Labs  Lab 02/24/17  0510   WBC 6.54   RBC 3.31*   HGB 9.4*   HCT 30.0*      MCV 91   MCH 28.4   MCHC 31.3*     CMP:     Recent Labs  Lab 02/26/17  0542      CALCIUM 8.6*      K 4.2   CO2 29      BUN 22   CREATININE 0.9     Coagulation:   No results for input(s): INR, APTT in the last 168 hours.    Invalid input(s): PT    ASSESSMENT/PLAN:     Assessment: 83 yo F with dementia, HTN, paroxysmal atrial fibrillation with left common femoral,popliteal embolus s/p  left femoral embolectomy of CFA, PFA and SFA with popliteal and tibial embolectomy and LLE fasciotomy.    Plan:   Neuro: Continue pain control with po pain medication   + baseline dementia, oriented to person => home  medications resumed  Pulm: Saturating well on RA; continue pulmonary toilet.   Cardiovascular: CARMEN score of 6; on metoprolol; lisinopril   - Echo 2/15/17 noted EF of 20%   GI: cardiac diet as tolerated   : incontinent.   FEN: replace electrolytes as needed  RAAD- resolved, Cr back to baseline.   Patient a nurse assist for PO intake.  Heme/ID: Continue eliquis 2.5 mg po bid  PT/OT to evaluate and treat  CM: DC planning, stable for discharge to SNF     Jahaira Flornetino MD  PGY-3 General Surgery  Pager: [phone]

## 2017-02-27 LAB
ALBUMIN SERPL BCP-MCNC: 2.8 G/DL
ALP SERPL-CCNC: 98 U/L
ALT SERPL W/O P-5'-P-CCNC: 12 U/L
ANION GAP SERPL CALC-SCNC: 6 MMOL/L
ANION GAP SERPL CALC-SCNC: 7 MMOL/L
AST SERPL-CCNC: 22 U/L
BASOPHILS # BLD AUTO: 0.03 K/UL
BASOPHILS NFR BLD: 0.5 %
BILIRUB SERPL-MCNC: 0.6 MG/DL
BUN SERPL-MCNC: 17 MG/DL
BUN SERPL-MCNC: 18 MG/DL
CALCIUM SERPL-MCNC: 8.9 MG/DL
CALCIUM SERPL-MCNC: 9.3 MG/DL
CHLORIDE SERPL-SCNC: 101 MMOL/L
CHLORIDE SERPL-SCNC: 104 MMOL/L
CO2 SERPL-SCNC: 30 MMOL/L
CO2 SERPL-SCNC: 30 MMOL/L
CREAT SERPL-MCNC: 1 MG/DL
CREAT SERPL-MCNC: 1.1 MG/DL
DIFFERENTIAL METHOD: ABNORMAL
EOSINOPHIL # BLD AUTO: 0.3 K/UL
EOSINOPHIL NFR BLD: 4 %
ERYTHROCYTE [DISTWIDTH] IN BLOOD BY AUTOMATED COUNT: 14.1 %
EST. GFR  (AFRICAN AMERICAN): 54 ML/MIN/1.73 M^2
EST. GFR  (AFRICAN AMERICAN): >60 ML/MIN/1.73 M^2
EST. GFR  (NON AFRICAN AMERICAN): 46.9 ML/MIN/1.73 M^2
EST. GFR  (NON AFRICAN AMERICAN): 52.6 ML/MIN/1.73 M^2
GLUCOSE SERPL-MCNC: 101 MG/DL
GLUCOSE SERPL-MCNC: 98 MG/DL
HCT VFR BLD AUTO: 32 %
HGB BLD-MCNC: 10 G/DL
LYMPHOCYTES # BLD AUTO: 1.6 K/UL
LYMPHOCYTES NFR BLD: 24.8 %
MAGNESIUM SERPL-MCNC: 1.8 MG/DL
MAGNESIUM SERPL-MCNC: 2.1 MG/DL
MCH RBC QN AUTO: 29 PG
MCHC RBC AUTO-ENTMCNC: 31.3 %
MCV RBC AUTO: 93 FL
MONOCYTES # BLD AUTO: 0.5 K/UL
MONOCYTES NFR BLD: 7 %
NEUTROPHILS # BLD AUTO: 4.1 K/UL
NEUTROPHILS NFR BLD: 63.1 %
PHOSPHATE SERPL-MCNC: 3.2 MG/DL
PHOSPHATE SERPL-MCNC: 3.4 MG/DL
PLATELET # BLD AUTO: 387 K/UL
PMV BLD AUTO: 9.1 FL
POTASSIUM SERPL-SCNC: 4.3 MMOL/L
POTASSIUM SERPL-SCNC: 4.6 MMOL/L
PROT SERPL-MCNC: 6.6 G/DL
RBC # BLD AUTO: 3.45 M/UL
SODIUM SERPL-SCNC: 138 MMOL/L
SODIUM SERPL-SCNC: 140 MMOL/L
WBC # BLD AUTO: 6.54 K/UL

## 2017-02-27 PROCEDURE — 80048 BASIC METABOLIC PNL TOTAL CA: CPT

## 2017-02-27 PROCEDURE — 80053 COMPREHEN METABOLIC PANEL: CPT

## 2017-02-27 PROCEDURE — 97530 THERAPEUTIC ACTIVITIES: CPT

## 2017-02-27 PROCEDURE — 93010 ELECTROCARDIOGRAM REPORT: CPT | Mod: ,,, | Performed by: INTERNAL MEDICINE

## 2017-02-27 PROCEDURE — 20600001 HC STEP DOWN PRIVATE ROOM

## 2017-02-27 PROCEDURE — 84100 ASSAY OF PHOSPHORUS: CPT | Mod: 91

## 2017-02-27 PROCEDURE — 83735 ASSAY OF MAGNESIUM: CPT

## 2017-02-27 PROCEDURE — 36415 COLL VENOUS BLD VENIPUNCTURE: CPT

## 2017-02-27 PROCEDURE — 97110 THERAPEUTIC EXERCISES: CPT

## 2017-02-27 PROCEDURE — 84100 ASSAY OF PHOSPHORUS: CPT

## 2017-02-27 PROCEDURE — 83735 ASSAY OF MAGNESIUM: CPT | Mod: 91

## 2017-02-27 PROCEDURE — 25000003 PHARM REV CODE 250: Performed by: SURGERY

## 2017-02-27 PROCEDURE — 93005 ELECTROCARDIOGRAM TRACING: CPT

## 2017-02-27 PROCEDURE — 97116 GAIT TRAINING THERAPY: CPT

## 2017-02-27 PROCEDURE — 85025 COMPLETE CBC W/AUTO DIFF WBC: CPT

## 2017-02-27 PROCEDURE — 97802 MEDICAL NUTRITION INDIV IN: CPT

## 2017-02-27 RX ADMIN — MIRTAZAPINE 30 MG: 30 TABLET, FILM COATED ORAL at 08:02

## 2017-02-27 RX ADMIN — METOPROLOL TARTRATE 50 MG: 50 TABLET, FILM COATED ORAL at 08:02

## 2017-02-27 RX ADMIN — MEMANTINE HYDROCHLORIDE 10 MG: 5 TABLET ORAL at 08:02

## 2017-02-27 RX ADMIN — APIXABAN 2.5 MG: 2.5 TABLET, FILM COATED ORAL at 08:02

## 2017-02-27 RX ADMIN — MAGNESIUM OXIDE TAB 400 MG (241.3 MG ELEMENTAL MG) 800 MG: 400 (241.3 MG) TAB at 08:02

## 2017-02-27 RX ADMIN — ATORVASTATIN CALCIUM 10 MG: 10 TABLET, FILM COATED ORAL at 08:02

## 2017-02-27 RX ADMIN — ASPIRIN 81 MG: 81 TABLET, COATED ORAL at 08:02

## 2017-02-27 RX ADMIN — LISINOPRIL 2.5 MG: 2.5 TABLET ORAL at 08:02

## 2017-02-27 RX ADMIN — PANTOPRAZOLE SODIUM 40 MG: 40 TABLET, DELAYED RELEASE ORAL at 08:02

## 2017-02-27 RX ADMIN — ESCITALOPRAM OXALATE 10 MG: 10 TABLET ORAL at 08:02

## 2017-02-27 RX ADMIN — HYDROCODONE BITARTRATE AND ACETAMINOPHEN 1 TABLET: 5; 325 TABLET ORAL at 08:02

## 2017-02-27 RX ADMIN — STANDARDIZED SENNA CONCENTRATE AND DOCUSATE SODIUM 1 TABLET: 8.6; 5 TABLET, FILM COATED ORAL at 08:02

## 2017-02-27 NOTE — PHYSICIAN QUERY
"PT Name: Boubacar Chen  MR #: [id number]  Physician Query Form - Nutrition Clarification   Reviewer  Ext [phone] Marlena Robb RN,BSN,CDI    This form is a permanent document in the medical record.     Query Date: February 27, 2017  By submitting this query, we are merely seeking further clarification of documentation.. Please utilize your independent clinical judgment when addressing the question(s) below.    The Medical record reflects the following:   Indicators     Supporting Clinical Findings Location in Medical Record   x Wt/ BMI/ Usual Body Weight FR=011tir  HT=5'6  BMI=22.6  IP Coding, Epic    Alb          TProt            Pre-Albumin     x Acute or Chronic illness UTI, Dementia, HTN, Paroxysmal A. Fib, Left common femoral, popliteal embolus  02/15/17 Vascular Surgery Progress Note   x % of estimated energy intake over a time frame from p.o., TF or TPN Encourage increased PO intake. If PO intake decreases < 50% of meals, recommend adding Boost Plus OS to all meals   02/27/17 Nutrition Progress    Note    Weight status over a time frame      Subcutaneous fat and/or muscle loss      Reduced  strength      "Delayed wound healing:, "Failure to thrive"      "Fluid accumulation" or "Edema"      "Hypoalbuminemia"      Other:        AND / ASPEN Clinical Characteristics (October 2011)  A minimum of two characteristics is recommended for diagnosing either moderate or severe malnutrition    Mild Malnutrition Moderate Malnutrition Severe Malnutrition    Energy Intake from p.o., TF or TPN. < 75% intake of estimated energy needs for less than 7 days. < 75% intake of estimated energy needs for greater than 7 days. < 50% intake of estimated energy needs for > 5 days   Weight Loss 1-2% in 1 month  5% in 3 months  7.5% in 6 months  10% in one year 1-2 % in 1 week  5% in 1 month  7.5% in 3 months  10% in 6 months  20% in 1 year > 2% in 1 week  > 5% in 1 month  >7.5% in 3 months  >10% in 6 months  >20% in 1 year "   Physical Findings None *Mild subcutaneous fat and/or muscle loss  *Mild fluid accumulation  *Stage II decubitus  *Surgical wound or non-healing wound *Mod subcutaneous fat and/or muscle loss  *Mod/severe fluid accumulation  *Stage III or IV decubitus  *Non-healing surgical wound     Provider, please specify the diagnosis or diagnoses associated with above clinical findings.                                [ ] Mild Protein-Calorie Malnutrition  [X ] Moderate Protein-Calorie Malnutrition  [ ] Severe Protein-CalorieMalnutrition  [ ] Cachexia  [ ] Anorexia  [ ] Other Nutritional Diagnosis (Specify) ____________________________________  [ ] Clinically Undetermined    Please document in your progress notes daily for the duration of treatment, until resolved, and include in your discharge summary.

## 2017-02-27 NOTE — PLAN OF CARE
Flora informed by Tammi that OSNF no longer feels Pt is appropriate for short term SNF. Pt's family would like Pt to d/c home with HH and home woundvac. Sw informed medical team of above. MD to complete [employer] form and inform Sw when it is completed. Sw will send form in to [employer] once completed. Sw will continue to follow.      Mame Johnston, JOHN [phone]

## 2017-02-27 NOTE — PLAN OF CARE
Problem: Patient Care Overview  Goal: Plan of Care Review  Outcome: Ongoing (interventions implemented as appropriate)  Recommendations  Encourage increased PO intake. If PO intake decreases < 50% of meals, recommend adding Boost Plus OS to all meals.      RD to monitor. Full assessment completed. See RD Progress Note 2/27/17.

## 2017-02-27 NOTE — PT/OT/SLP PROGRESS
Physical Therapy  Treatment    Boubacar Chen   MRN: [id number]   Admitting Diagnosis: Lower limb ischemia    PT Received On: 17  PT Start Time: 914     PT Stop Time: 938    PT Total Time (min): 24 min       Billable Minutes:  Gait Training8, Therapeutic Activity 8 and Therapeutic Exercise 8    Treatment Type: Treatment  PT/PTA: PTA     PTA Visit Number: 1       General Precautions: Standard, fall  Orthopedic Precautions: LLE weight bearing as tolerated   Braces: N/A    Do you have any cultural, spiritual, Pentecostalism conflicts, given your current situation?: none     Subjective:  Communicated with RN prior to session.  I will try    Pain Ratin/10           Pain Addressed: Pre-medicate for activity  Pain Rating Post-Intervention: 2/10    Objective:   Patient found with: peripheral IV, wound vac, telemetry    Functional Mobility:  Bed Mobility:   Rolling/Turning to Left: Supervision  Scooting/Bridging: Supervision  Supine to Sit: Supervision    Transfers:  Sit <> Stand Assistance: Contact Guard Assistance, Stand By Assistance  Sit <> Stand Assistive Device: Rolling Walker    Gait:   Gait Distance: 300 ft with vcs for upright posture and placement of AD. wound vac in tow  Assistance 1: Stand by Assistance, Contact Guard Assistance  Gait Assistive Device: Rolling walker  Gait Pattern: 3-point gait  Gait Deviation(s): decreased abhi, decreased step length, decreased stride length, forward lean, increased time in double stance    Therapeutic Activities and Exercises:  Discussed pt progress   Patient performed therex X 15 reps seated in bedside chair B LE AROM AP, LAQ, Hip Flexion, Hip Abd/Add   White board updated  Pt encouraged to ambulate in hallways 3x/day with nursing or family assistance to improve endurance.     AM-PAC 6 CLICK MOBILITY  How much help from another person does this patient currently need?   1 = Unable, Total/Dependent Assistance  2 = A lot, Maximum/Moderate Assistance  3 = A little,  Minimum/Contact Guard/Supervision  4 = None, Modified [place]/Independent    Turning over in bed (including adjusting bedclothes, sheets and blankets)?: 3  Sitting down on and standing up from a chair with arms (e.g., wheelchair, bedside commode, etc.): 3  Moving from lying on back to sitting on the side of the bed?: 3  Moving to and from a bed to a chair (including a wheelchair)?: 3  Need to walk in hospital room?: 3  Climbing 3-5 steps with a railing?: 2  Total Score: 17    AM-PAC Raw Score CMS G-Code Modifier Level of Impairment Assistance   6 % Total / Unable   7 - 9 CM 80 - 100% Maximal Assist   10 - 14 CL 60 - 80% Moderate Assist   15 - 19 CK 40 - 60% Moderate Assist   20 - 22 CJ 20 - 40% Minimal Assist   23 CI 1-20% SBA / CGA   24 CH 0% Independent/ Mod I     Patient left up in chair with all lines intact, call button in reach and nsg notified.    Assessment:  Boubacar Chen is a 82 y.o. female with a medical diagnosis of Lower limb ischemia and presents with continued deficits as listed below. Pt continues to remain motivated and cooperative with treatment session. Pt Progressing with PT Intervention. Pt Progressing with increase gait distance from previous visit. Pt would continue to benefit from skilled PT to address overall functional mobility and goals. Goals remain appropriate.      Rehab identified problem list/impairments: Rehab identified problem list/impairments: weakness, impaired endurance, impaired self care skills, impaired functional mobilty, gait instability, impaired cardiopulmonary response to activity, impaired skin, decreased lower extremity function, decreased safety awareness, impaired balance    Rehab potential is good.    Activity tolerance: Good    Discharge recommendations: Discharge Facility/Level Of Care Needs: nursing facility, skilled     Barriers to discharge: Barriers to Discharge: Decreased caregiver support    Equipment recommendations: Equipment Needed  After Discharge:  (TBD)     GOALS:   Physical Therapy Goals        Problem: Physical Therapy Goal    Goal Priority Disciplines Outcome Goal Variances Interventions   Physical Therapy Goal     PT/OT, PT Ongoing (interventions implemented as appropriate)     Description:  Goals to be met by: 3/1/17     Patient will increase functional independence with mobility by performin. Supine to sit with Contact Guard Assistance-met  2. Sit to stand transfer with Contact Guard Assistance- met  3. Gait  x 25 feet with Minimal Assistance using Rolling Walker.-met   Revised:  4. Sit to/from supine modified independent.  5. Sit to/from stand using RW prn with modified independent.  6. Stand pivot transfer with/without RW modified independent.  7. Gait on level surfaces 200' with RW Spv/SBA.  8. Lower extremity exercise program x15 reps per handout, with assistance as needed-not met                    PLAN:    Patient to be seen 5 x/week  to address the above listed problems via therapeutic activities, gait training, therapeutic exercises, neuromuscular re-education  Plan of Care expires: 17  Plan of Care reviewed with: patient, spouse, daughter         Param Acevedo, PTA  2017

## 2017-02-27 NOTE — PLAN OF CARE
Problem: Physical Therapy Goal  Goal: Physical Therapy Goal  Goals to be met by: 3/1/17     Patient will increase functional independence with mobility by performin. Supine to sit with Contact Guard Assistance-met  2. Sit to stand transfer with Contact Guard Assistance- met  3. Gait x 25 feet with Minimal Assistance using Rolling Walker.-met   Revised:  4. Sit to/from supine modified independent.  5. Sit to/from stand using RW prn with modified independent.  6. Stand pivot transfer with/without RW modified independent.  7. Gait on level surfaces 200 with RW Spv/SBA.  8. Lower extremity exercise program x15 reps per handout, with assistance as needed-not met   Pt progressing towards goals. continue with PT POC.Goals remain appropriate.     Param Acevedo PTA  2017

## 2017-02-27 NOTE — PLAN OF CARE
Problem: Patient Care Overview  Goal: Plan of Care Review  Outcome: Ongoing (interventions implemented as appropriate)  Remained safe throughout shift. Neurovascular checks Q4H. Wound vac to LLE intact at 125 mmHg; noted with serosanguineous drainage. Left groin dressing intact with gauze and tegaderm. Transferring to bedside commode with assist x1. Denies pain. Remained free from falls or injuries with bed alarm on, bed locked, bed in lowest position, and call bell within reach. Spouse at bedside.

## 2017-02-27 NOTE — PROGRESS NOTES
[facility]  Adult Nutrition  Progress Note    SUMMARY     Recommendations  Recommendation/Intervention: Encourage increased PO intake. If PO intake decreases < 50% of meals, recommend adding Boost Plus OS to all meals. RD to monitor.  Goals: Patient will continue to consume > 75% of meals  Nutrition Goal Status: new  Communication of RD Recs: discussed on rounds    Reason for Assessment  Reason for Assessment: length of stay  Diagnosis:  (lower limb ischemia)  Relevent Medical History: dementia, HTN, HLD, A fib   Interdisciplinary Rounds: attended   General Information Comments: Patient with good appetite and eating ~ 75% of meals. Nutrition D/C Planning: adequate nutrition via PO intake.    Nutrition Prescription Ordered  Current Diet Order: Cardiac     Nutrition/Diet History  Patient Reported Diet/Restrictions/Preferences: general  Typical Food/Fluid Intake: Patient with good appetite and PO intake.  Food Preferences: No cultural or Yarsani needs identified at this time.   Factors Affecting Nutritional Intake:  (none)     Labs/Tests/Procedures/Meds   Pertinent Labs Reviewed: reviewed   Pertinent Medications Reviewed: reviewed  Pertinent Medications Comments: Pantoprazole, senna-docusate    Physical Findings   Tubes:  (none)  Oral/Mouth Cavity: tooth/teeth missing  Skin:  (incision)    Anthropometrics   Height (inches): 65.98 in  Weight Method: Estimated  Weight (kg): 63.5 kg   Ideal Body Weight (IBW), Female: 129.9 lb   % Ideal Body Weight, Female (lb): 107.77 lb  BMI (kg/m2): 22.61  BMI Grade: 18.5-24.9 - normal      Monitor and Evaluation  Food and Nutrient Intake: energy intake, food and beverage intake  Food and Nutrient Adminstration: diet order   Physical Activity and Function: nutrition-related ADLs and IADLs  Anthropometric Measurements: weight change  Biochemical Data, Medical Tests and Procedures: electrolyte and renal panel, gastrointestinal profile  Nutrition-Focused Physical  Findings: overall appearance    Nutrition Risk  Level of Risk: low    Nutrition Follow-Up  RD Follow-up?: Yes    Nutrition Diagnosis  No nutrition problem at this time.

## 2017-02-27 NOTE — PLAN OF CARE
Problem: Patient Care Overview  Goal: Plan of Care Review  Outcome: Ongoing (interventions implemented as appropriate)  Plan of care reviewed with pt's spouse, pt confused only oriented to self, reoriented throughout care. VSS, RA, LL extremity dressing intact wound vac at 125, pt denied pain, bed alarm remains on, tolerate regular diet well, BM X2, void without difficulties, spouse remains at bed side, no acute distress noted at present time, will continue to monitor .

## 2017-02-27 NOTE — PLAN OF CARE
Per Right Care Note from Lucia Tobias, pt has been medically accepted to [facility] pending financials.  [facility]    [facility] will continue to follow up.    Robert Crowley is reviewing case.    July Burt LCSW     [phone]  Critical Care (MICU)

## 2017-02-27 NOTE — PLAN OF CARE
"CM faxed wound care / progress note to K[facility]ney at [facility] [phone] / fax [phone].  Awaiting return call.    CM met with pt and  and explained that Tali (pt's daughter) stated pt could go to her house and we could get home health, explained that pt's insurance stated today is the last covered day.  Pt's  then accused up of "not doing anything until the last minute".  Reminded  that we have been working on this for two weeks and he kept changing his mind and refused to meet with the last liaison.      CM updated supervisor.    Mildred Rubio RN, CCM [facility]  SICU/ENT/Vascular Surgery  Ext [phone]        "

## 2017-02-27 NOTE — PLAN OF CARE
Pt has been accepted to the [facility].  Facility is [facility] auth.    SW will continue to follow.    July Burt LCSW     [phone]  Critical Care (MICU)

## 2017-02-27 NOTE — PLAN OF CARE
Problem: Patient Care Overview  Goal: Plan of Care Review  Outcome: Ongoing (interventions implemented as appropriate)  Plan of care reviewed with patient who verbalized understanding.  Oriented to self only.  Pt with not much appetite today despite a lot of encouragement and assistance to eat.  Pt repeatedly stating she wants to go home.  NV checks Q4.  Pt c/o pain once this shift relieved with prn pain medication.  NSR on tele.  Call bell remains within reach.  Pt sat in the chair most of the shift.  Bed alarm set.  Bed in lowest position.  Frequent rounds made for pt safety.  Patient remains free of any new or additional falls/injury at this time.  VSS on RA, will continue to monitor.

## 2017-02-27 NOTE — PROGRESS NOTES
Progress Note  Vascular Surgery    Admit Date: 2/14/2017  Post-operative Day: 13 Days Post-Op  Hospital Day: 14    SUBJECTIVE:     Follow-up For:  Procedure(s) (LRB):  THROMBECTOMY (Left)  FASCIOTOMY (Left)    No acute events overnight. Awaiting placement.    OBJECTIVE:     Vital Signs (Most Recent)  Temp: 97.7 °F (36.5 °C) (02/27/17 0438)  Pulse: 81 (02/27/17 0700)  Resp: 16 (02/27/17 0438)  BP: 126/62 (02/27/17 0438)  SpO2: 95 % (02/27/17 0438)    Vital Signs Range (Last 24H):  Temp:  [97.6 °F (36.4 °C)-98.8 °F (37.1 °C)]   Pulse:  [75-97]   Resp:  [16-18]   BP: (104-126)/(58-65)   SpO2:  [92 %-100 %]     I & O (Last 24H):    Intake/Output Summary (Last 24 hours) at 02/27/17 0750  Last data filed at 02/27/17 0440   Gross per 24 hour   Intake              720 ml   Output             1825 ml   Net            -1105 ml     Physical Exam:  General: well developed, well nourished, appears stated age, no distress  Head: normocephalic, atraumatic  Eyes:  PERRL, EOMI  Lungs:  normal respiratory effort  Heart: normal sinus  Extremities: left lower extremity wound vac to lateral incision, medial incision of fasciotomy closed; improved edema though still present; groin incision c/d/i  Vascular: LLE: 2+DP; RLE: 2+ PT    CBC:     Recent Labs  Lab 02/24/17  0510   WBC 6.54   RBC 3.31*   HGB 9.4*   HCT 30.0*      MCV 91   MCH 28.4   MCHC 31.3*     CMP:     Recent Labs  Lab 02/27/17  0425   GLU 98   CALCIUM 8.9      K 4.3   CO2 30*      BUN 17   CREATININE 1.0     Coagulation:   No results for input(s): INR, APTT in the last 168 hours.    Invalid input(s): PT    ASSESSMENT/PLAN:     Assessment: 81 yo F with dementia, HTN, paroxysmal atrial fibrillation with left common femoral,popliteal embolus s/p  left femoral embolectomy of CFA, PFA and SFA with popliteal and tibial embolectomy and LLE fasciotomy.    Plan:   Neuro: Continue pain control with po pain medication   + baseline dementia, oriented to person => home  medications resumed  Pulm: Saturating well on RA; continue pulmonary toilet.   Cardiovascular: CARMEN score of 6; on metoprolol; lisinopril   - Echo 2/15/17 noted EF of 20%   GI: cardiac diet as tolerated   : incontinent.   FEN: replace electrolytes as needed  RAAD- resolved, Cr back to baseline  Patient a nurse assist for PO intake  Heme/ID: Continue eliquis 2.5 mg po bid  PT/OT to evaluate and treat    Dispo: Stable for discharge to SNF when space available    Michele Bull MD  Surgery Resident, PGY-1  Pager [phone]  02/27/2017

## 2017-02-27 NOTE — PLAN OF CARE
LAURA rec a call from pt's daughter (this am) stating that she discussed with pt's  and they decided pt can go to Tali's house and Tali could take care of pt and they could get home health to assist with wound vac dressing changes.  LAURA updated Brittney CHANG on plan change and need for home wound vac orders / hh orders.    LAURA returned Tali's call and was informed by Tali that  did not want pt to go to Tali's house, states she is unsure why.    CM call and lvm for Brittney CHANG to continue to SNF plan.  CM updated Freddie ext [phone] (pt's nurse) of issues.    Mildred Rubio, RN, CCM [facility]  SICU/ENT/Vascular Surgery  Ext [phone]

## 2017-02-28 LAB
ANION GAP SERPL CALC-SCNC: 7 MMOL/L
BUN SERPL-MCNC: 18 MG/DL
CALCIUM SERPL-MCNC: 9 MG/DL
CHLORIDE SERPL-SCNC: 100 MMOL/L
CO2 SERPL-SCNC: 30 MMOL/L
CREAT SERPL-MCNC: 1.1 MG/DL
EST. GFR  (AFRICAN AMERICAN): 54 ML/MIN/1.73 M^2
EST. GFR  (NON AFRICAN AMERICAN): 46.9 ML/MIN/1.73 M^2
GLUCOSE SERPL-MCNC: 101 MG/DL
MAGNESIUM SERPL-MCNC: 1.8 MG/DL
PHOSPHATE SERPL-MCNC: 4 MG/DL
POTASSIUM SERPL-SCNC: 4.3 MMOL/L
SODIUM SERPL-SCNC: 137 MMOL/L

## 2017-02-28 PROCEDURE — 84100 ASSAY OF PHOSPHORUS: CPT

## 2017-02-28 PROCEDURE — 25000003 PHARM REV CODE 250: Performed by: SURGERY

## 2017-02-28 PROCEDURE — 99222 1ST HOSP IP/OBS MODERATE 55: CPT | Mod: ,,, | Performed by: INTERNAL MEDICINE

## 2017-02-28 PROCEDURE — 36415 COLL VENOUS BLD VENIPUNCTURE: CPT

## 2017-02-28 PROCEDURE — 20600001 HC STEP DOWN PRIVATE ROOM

## 2017-02-28 PROCEDURE — 83735 ASSAY OF MAGNESIUM: CPT

## 2017-02-28 PROCEDURE — 80048 BASIC METABOLIC PNL TOTAL CA: CPT

## 2017-02-28 RX ADMIN — ASPIRIN 81 MG: 81 TABLET, COATED ORAL at 09:02

## 2017-02-28 RX ADMIN — ESCITALOPRAM OXALATE 10 MG: 10 TABLET ORAL at 09:02

## 2017-02-28 RX ADMIN — MAGNESIUM OXIDE TAB 400 MG (241.3 MG ELEMENTAL MG) 800 MG: 400 (241.3 MG) TAB at 09:02

## 2017-02-28 RX ADMIN — STANDARDIZED SENNA CONCENTRATE AND DOCUSATE SODIUM 1 TABLET: 8.6; 5 TABLET, FILM COATED ORAL at 09:02

## 2017-02-28 RX ADMIN — MIRTAZAPINE 30 MG: 30 TABLET, FILM COATED ORAL at 09:02

## 2017-02-28 RX ADMIN — APIXABAN 2.5 MG: 2.5 TABLET, FILM COATED ORAL at 09:02

## 2017-02-28 RX ADMIN — MEMANTINE HYDROCHLORIDE 10 MG: 5 TABLET ORAL at 09:02

## 2017-02-28 RX ADMIN — ATORVASTATIN CALCIUM 10 MG: 10 TABLET, FILM COATED ORAL at 09:02

## 2017-02-28 RX ADMIN — MEMANTINE HYDROCHLORIDE 10 MG: 5 TABLET ORAL at 11:02

## 2017-02-28 RX ADMIN — PANTOPRAZOLE SODIUM 40 MG: 40 TABLET, DELAYED RELEASE ORAL at 09:02

## 2017-02-28 NOTE — CONSULTS
"Consults  Cardiology    SUBJECTIVE:   Chief Complaint/Reason for Admission: Atrial Flutter  History of Present Illness:  Boubacar Chen is a 82 y.o. female with PMHx of dementia, HTN, HLD, and history of unclear "arrhythmia" is admitted to the hospital for acute limb ischemia due to left femoral arterial embolus (now s/p embolectomy and fasciotomy 2/15/17). Cardiology is consulted in regards to new onset first degree AV block.      Patient was brought to the hospital from her nursing home after acute left leg pain. At the [facility], her pulse on LLE wasn't felt; her leg was cold, and she c/o pain. Patient is a poor historian due to baseline dementia, but  at bedside reports she was seen by a cardiologist in [place], [place] (Dr. Smith) for "arrhythmia," possibly atrial fibrillation which she had for several years. However, she doesn't seem to have been anticoagulated previously, and there are no documented records of atrial fibrillation at [facility] (neither telemetry nor EKG currently show episodes of paroxysmal atrial fibrillation).  reports history of TIA 25 years ago, and a smoking history of 10 pack year history over 20 years ago. Otherwise, patient is now a nonsmoker. She lives at [facility] due to her Alzheimer's dementia per her  with cognitive decline beginning 10 years ago. She is somewhat functional at baseline (can bathe, dress and use the restroom by herself) but doesn't remember her medical history.     Cardiology was initially consulted on 2/15 in regards to paroxysmal atrial fibrillation, where she was started on metoprolol tartrate 50 mg PO BID for rate control, in addition to eliquis 2.5 mg PO BID for anticoagulation.  Given her mental status and debility, further options for previously seen wide complex tachycardia were deferred, as goals of care needed to be established.      Patient seen in AM of 2/28, where she did not have any complaints at the time.  EKG " done night prior revealed a SC interval of 232 without any other noted EKG changes.  Of note, 2D echo performed on 2/15 revealed an EF of 20%.      Past Medical History:   Diagnosis Date    Anxiety     Arthritis     Dementia     Former smoker     Hyperlipidemia     Hypertension     Stroke     at age 58      Past Surgical History:   Procedure Laterality Date    ANTERIOR VAGINAL REPAIR  1960's    COLONOSCOPY  2008    COLONOSCOPY N/A 1/5/2016    Procedure: COLONOSCOPY;  Surgeon: Daniel Alicea MD;  Location: [facility];  Service: Endoscopy;  Laterality: N/A;    HYSTERECTOMY      total    JOINT REPLACEMENT      bilateral hip    UPPER GASTROINTESTINAL ENDOSCOPY  2008     h. pylori      No current facility-administered medications on file prior to encounter.      Current Outpatient Prescriptions on File Prior to Encounter   Medication Sig Dispense Refill    aripiprazole (ABILIFY) 10 MG Tab Take 5 mg by mouth once daily.      atorvastatin (LIPITOR) 10 MG tablet Take 10 mg by mouth once daily.       butalbital-acetaminophen-caffeine -40 mg (FIORICET) -40 mg per tablet Take 1 tablet by mouth every 4 (four) hours as needed for Pain.      donepezil (ARICEPT) 10 MG tablet Take 10 mg by mouth once daily.       escitalopram (LEXAPRO) 10 MG tablet Take 10 mg by mouth once daily.       ginkgo biloba 60 mg Cap Take 1 tablet by mouth once daily.      metoprolol succinate (TOPROL-XL) 100 MG 24 hr tablet Take 100 mg by mouth once daily.       mirabegron (MYRBETRIQ) 25 mg Tb24 ER tablet Take 1 tablet (25 mg total) by mouth once daily. 30 tablet 11    mirtazapine (REMERON) 30 MG tablet Take 30 mg by mouth every evening.      NAMENDA 10 mg Tab 10 mg 2 (two) times daily.       pantoprazole (PROTONIX) 40 MG tablet Take 1 tablet (40 mg total) by mouth once daily. 30 tablet 6    triamterene-hydrochlorothiazide 37.5-25 mg (DYAZIDE) 37.5-25 mg per capsule Take 1 capsule by mouth every morning.         Review of patient's allergies indicates:  No Known Allergies    Family History   Problem Relation Age of Onset    Colon cancer Mother      in late 60s    Colon cancer Father        reports that she quit smoking about 18 years ago. Her smoking use included Cigarettes. She has a 25.00 pack-year smoking history. She does not have any smokeless tobacco history on file. She reports that she does not drink alcohol or use illicit drugs.     Review of Systems   Constitution: Negative for diaphoresis and malaise/fatigue.   Cardiovascular: Negative for chest pain, irregular heartbeat, near-syncope, orthopnea, palpitations and syncope.   Respiratory: Negative for shortness of breath.   Gastrointestinal: Negative for abdominal pain.     OBJECTIVE:     Vital Signs (Most Recent):  Temp: 97.8 °F (36.6 °C) (02/28/17 0745)  Pulse: 81 (02/28/17 0859)  Resp: 14 (02/28/17 0745)  BP: (!) 102/58 (02/28/17 0859)  SpO2: (!) 93 % (02/28/17 0745) Vital Signs (24h Range):  Temp:  [97.1 °F (36.2 °C)-98.3 °F (36.8 °C)] 97.8 °F (36.6 °C)  Pulse:  [] 81  Resp:  [14-18] 14  SpO2:  [92 %-97 %] 93 %  BP: ()/(52-61) 102/58     I & O (24h):    Intake/Output Summary (Last 24 hours) at 02/28/17 1001  Last data filed at 02/28/17 0500   Gross per 24 hour   Intake              718 ml   Output             1050 ml   Net             -332 ml        Physical Exam   Constitutional: No distress.  Delightfully demented.    Head: Normocephalic and atraumatic.   Eyes: No scleral icterus.   Neck: Normal range of motion. No JVD present.   Cardiovascular: Normal rate, regular rhythm, normal heart sounds and intact distal pulses. Exam reveals no gallop and no friction rub.   No murmur heard.  Pulmonary/Chest: Effort normal and breath sounds normal. No respiratory distress. She has no wheezes. She has no rales. She exhibits no tenderness.   Abdominal: Soft. Bowel sounds are normal. She exhibits no distension. There is no tenderness.   Musculoskeletal: She  exhibits no edema.   LLE with medial scar, wound vac placed in lateral aspect of leg   Neurological: She is alert.   Not oriented to person, place or time   Skin: Skin is warm. She is not diaphoretic.     Laboratory:  Recent Results (from the past 24 hour(s))   CBC auto differential    Collection Time: 02/27/17  9:21 PM   Result Value Ref Range    WBC 6.54 3.90 - 12.70 K/uL    RBC 3.45 (L) 4.00 - 5.40 M/uL    Hemoglobin 10.0 (L) 12.0 - 16.0 g/dL    Hematocrit 32.0 (L) 37.0 - 48.5 %    MCV 93 82 - 98 fL    MCH 29.0 27.0 - 31.0 pg    MCHC 31.3 (L) 32.0 - 36.0 %    RDW 14.1 11.5 - 14.5 %    Platelets 387 (H) 150 - 350 K/uL    MPV 9.1 (L) 9.2 - 12.9 fL    Gran # 4.1 1.8 - 7.7 K/uL    Lymph # 1.6 1.0 - 4.8 K/uL    Mono # 0.5 0.3 - 1.0 K/uL    Eos # 0.3 0.0 - 0.5 K/uL    Baso # 0.03 0.00 - 0.20 K/uL    Gran% 63.1 38.0 - 73.0 %    Lymph% 24.8 18.0 - 48.0 %    Mono% 7.0 4.0 - 15.0 %    Eosinophil% 4.0 0.0 - 8.0 %    Basophil% 0.5 0.0 - 1.9 %    Differential Method Automated    Comprehensive metabolic panel    Collection Time: 02/27/17  9:21 PM   Result Value Ref Range    Sodium 138 136 - 145 mmol/L    Potassium 4.6 3.5 - 5.1 mmol/L    Chloride 101 95 - 110 mmol/L    CO2 30 (H) 23 - 29 mmol/L    Glucose 101 70 - 110 mg/dL    BUN, Bld 18 8 - 23 mg/dL    Creatinine 1.1 0.5 - 1.4 mg/dL    Calcium 9.3 8.7 - 10.5 mg/dL    Total Protein 6.6 6.0 - 8.4 g/dL    Albumin 2.8 (L) 3.5 - 5.2 g/dL    Total Bilirubin 0.6 0.1 - 1.0 mg/dL    Alkaline Phosphatase 98 55 - 135 U/L    AST 22 10 - 40 U/L    ALT 12 10 - 44 U/L    Anion Gap 7 (L) 8 - 16 mmol/L    eGFR if African American 54.0 (A) >60 mL/min/1.73 m^2    eGFR if non  46.9 (A) >60 mL/min/1.73 m^2   Magnesium    Collection Time: 02/27/17  9:21 PM   Result Value Ref Range    Magnesium 1.8 1.6 - 2.6 mg/dL   Phosphorus    Collection Time: 02/27/17  9:21 PM   Result Value Ref Range    Phosphorus 3.2 2.7 - 4.5 mg/dL   Basic metabolic panel    Collection Time: 02/28/17  4:31  "AM   Result Value Ref Range    Sodium 137 136 - 145 mmol/L    Potassium 4.3 3.5 - 5.1 mmol/L    Chloride 100 95 - 110 mmol/L    CO2 30 (H) 23 - 29 mmol/L    Glucose 101 70 - 110 mg/dL    BUN, Bld 18 8 - 23 mg/dL    Creatinine 1.1 0.5 - 1.4 mg/dL    Calcium 9.0 8.7 - 10.5 mg/dL    Anion Gap 7 (L) 8 - 16 mmol/L    eGFR if African American 54.0 (A) >60 mL/min/1.73 m^2    eGFR if non  46.9 (A) >60 mL/min/1.73 m^2   Magnesium    Collection Time: 02/28/17  4:31 AM   Result Value Ref Range    Magnesium 1.8 1.6 - 2.6 mg/dL   Phosphorus    Collection Time: 02/28/17  4:31 AM   Result Value Ref Range    Phosphorus 4.0 2.7 - 4.5 mg/dL     EKG 2/27/2017    Sinus rhythm with 1st degree AV block with occasional PVCs      ASSESSMENT/PLAN:     Present on Admission:   Lower limb ischemia   Lower urinary tract infectious disease   Critical lower limb ischemia      Boubacar Chen is a 82 y.o. female with PMHx of dementia, HTN, HLD, and history of unclear "arrhythmia" is admitted to the hospital for acute limb ischemia due to left femoral arterial embolus (now s/p embolectomy and fasciotomy 2/15/17). Cardiology is consulted in regards to new onset first degree AV block.  As patient was recently started on metoprolol on 2/15 for rate control, likely culprit.  Patient asymptomatic, no other EKG changes noted at this time.  Continue medical management.      Plan  - switch metoprolol tartrate 50 mg PO BID to metoprolol succinate 100 mg PO daily.  As patient with evidence of HFrEF, switching would start the patient on a beta blocker known to treat heart failure.  Would also help with rate control for atrial fibrillation.      Thank you for allowing us to participate in the patient's care.  We will sign off now.  If you have any questions, please do not hesitate to call.  Discussed with Staff, Dr. Wolff.      Jacky Vásquez MD  Internal Medicine PGY-1  [phone]          "

## 2017-02-28 NOTE — PLAN OF CARE
CHARLENE placed call to [facility] [phone] and was informed that admission department was closed today. CHARLENE unable to verify acceptance/ insurance authorization obtained.

## 2017-02-28 NOTE — PROGRESS NOTES
Pt had 10 beat run of Vtach. Vitals: BP 98/54, HR 82, 95% on room air. Pt denies shortness of breath, chest pain, or dizziness. Notified Dr. Chopra; MD will enter orders for STAT EKG and labs. Will continue to monitor.

## 2017-02-28 NOTE — PROGRESS NOTES
Notified Dr. Drew of pt BP 95/54 and HR .  Pt up in chair with no complaints at this time.  MD verbalized understanding.  Orders to hold this AM metoprolol and lisinopril.  WCCATALINA.

## 2017-02-28 NOTE — PROGRESS NOTES
Progress Note  Vascular Surgery    Admit Date: 2/14/2017  Post-operative Day: 14 Days Post-Op  Hospital Day: 15    SUBJECTIVE:     Follow-up For:  Procedure(s) (LRB):  THROMBECTOMY (Left)  FASCIOTOMY (Left)    Overnight Pt had 10 beat run of V-tach, EKG showed new onset first degree block. Wound vac changed 2/27. Doing well this am.    OBJECTIVE:     Vital Signs (Most Recent)  Temp: 97.9 °F (36.6 °C) (02/28/17 0508)  Pulse: 90 (02/28/17 0700)  Resp: 16 (02/28/17 0508)  BP: 111/61 (02/28/17 0508)  SpO2: (!) 93 % (02/28/17 0508)    Vital Signs Range (Last 24H):  Temp:  [97.1 °F (36.2 °C)-98.3 °F (36.8 °C)]   Pulse:  []   Resp:  [16-18]   BP: ()/(52-62)   SpO2:  [92 %-97 %]     I & O (Last 24H):    Intake/Output Summary (Last 24 hours) at 02/28/17 0707  Last data filed at 02/28/17 0500   Gross per 24 hour   Intake              898 ml   Output             1050 ml   Net             -152 ml     Physical Exam:  General: well developed, well nourished, appears stated age, no distress  Head: normocephalic, atraumatic  Eyes:  PERRL, EOMI  Lungs:  normal respiratory effort  Heart: normal sinus  Extremities: left lower extremity wound vac to lateral incision, medial incision of fasciotomy closed; improved edema though still present; groin incision c/d/i  Vascular: LLE: 2+DP; RLE: 2+ PT    CBC:     Recent Labs  Lab 02/27/17 2121   WBC 6.54   RBC 3.45*   HGB 10.0*   HCT 32.0*   *   MCV 93   MCH 29.0   MCHC 31.3*     CMP:     Recent Labs  Lab 02/27/17 2121 02/28/17  0431    101   CALCIUM 9.3 9.0   ALBUMIN 2.8*  --    PROT 6.6  --     137   K 4.6 4.3   CO2 30* 30*    100   BUN 18 18   CREATININE 1.1 1.1   ALKPHOS 98  --    ALT 12  --    AST 22  --    BILITOT 0.6  --      Coagulation:   No results for input(s): INR, APTT in the last 168 hours.    Invalid input(s): PT    ASSESSMENT/PLAN:     Assessment: 83 yo F with dementia, HTN, paroxysmal atrial fibrillation with left common  femoral,popliteal embolus s/p  left femoral embolectomy of CFA, PFA and SFA with popliteal and tibial embolectomy and LLE fasciotomy.    Plan:   Neuro: Continue pain control with po pain medication   + baseline dementia, oriented to person => home medications resumed  Pulm: Saturating well on RA; continue pulmonary toilet.   Cardiovascular: CARMEN score of 6; on metoprolol; lisinopril   - Echo 2/15/17 noted EF of 20%    - New onset first degree block   - Cardiology consulted; appreciate recs  GI: cardiac diet as tolerated   : incontinent.   FEN: replace electrolytes as needed  RAAD- resolved, Cr back to baseline  Patient a nurse assist for PO intake  Heme/ID: Continue eliquis 2.5 mg po bid  PT/OT to evaluate and treat    Dispo: Stable for discharge to SNF when space available    Michele Bull MD  Surgery Resident, PGY-1  Pager [phone]  02/28/2017

## 2017-03-01 VITALS
DIASTOLIC BLOOD PRESSURE: 51 MMHG | RESPIRATION RATE: 16 BRPM | WEIGHT: 140 LBS | HEART RATE: 92 BPM | HEIGHT: 66 IN | TEMPERATURE: 98 F | SYSTOLIC BLOOD PRESSURE: 90 MMHG | OXYGEN SATURATION: 92 % | BODY MASS INDEX: 22.5 KG/M2

## 2017-03-01 LAB
ANION GAP SERPL CALC-SCNC: 7 MMOL/L
BUN SERPL-MCNC: 20 MG/DL
CALCIUM SERPL-MCNC: 8.7 MG/DL
CHLORIDE SERPL-SCNC: 103 MMOL/L
CO2 SERPL-SCNC: 28 MMOL/L
CREAT SERPL-MCNC: 1.2 MG/DL
EST. GFR  (AFRICAN AMERICAN): 48.6 ML/MIN/1.73 M^2
EST. GFR  (NON AFRICAN AMERICAN): 42.2 ML/MIN/1.73 M^2
GLUCOSE SERPL-MCNC: 101 MG/DL
MAGNESIUM SERPL-MCNC: 1.8 MG/DL
PHOSPHATE SERPL-MCNC: 3.9 MG/DL
POTASSIUM SERPL-SCNC: 4.3 MMOL/L
SODIUM SERPL-SCNC: 138 MMOL/L

## 2017-03-01 PROCEDURE — 80048 BASIC METABOLIC PNL TOTAL CA: CPT

## 2017-03-01 PROCEDURE — 84100 ASSAY OF PHOSPHORUS: CPT

## 2017-03-01 PROCEDURE — 97116 GAIT TRAINING THERAPY: CPT

## 2017-03-01 PROCEDURE — 25000003 PHARM REV CODE 250: Performed by: SURGERY

## 2017-03-01 PROCEDURE — 83735 ASSAY OF MAGNESIUM: CPT

## 2017-03-01 PROCEDURE — 36415 COLL VENOUS BLD VENIPUNCTURE: CPT

## 2017-03-01 PROCEDURE — 97530 THERAPEUTIC ACTIVITIES: CPT

## 2017-03-01 RX ORDER — DONEPEZIL HYDROCHLORIDE 10 MG/1
10 TABLET, FILM COATED ORAL DAILY
Qty: 30 TABLET | Refills: 11 | Status: SHIPPED | OUTPATIENT
Start: 2017-03-01 | End: 2017-03-08 | Stop reason: SDUPTHER

## 2017-03-01 RX ADMIN — LISINOPRIL 2.5 MG: 2.5 TABLET ORAL at 09:03

## 2017-03-01 RX ADMIN — PANTOPRAZOLE SODIUM 40 MG: 40 TABLET, DELAYED RELEASE ORAL at 09:03

## 2017-03-01 RX ADMIN — STANDARDIZED SENNA CONCENTRATE AND DOCUSATE SODIUM 1 TABLET: 8.6; 5 TABLET, FILM COATED ORAL at 09:03

## 2017-03-01 RX ADMIN — ESCITALOPRAM OXALATE 10 MG: 10 TABLET ORAL at 09:03

## 2017-03-01 RX ADMIN — METOPROLOL TARTRATE 50 MG: 50 TABLET, FILM COATED ORAL at 09:03

## 2017-03-01 RX ADMIN — ATORVASTATIN CALCIUM 10 MG: 10 TABLET, FILM COATED ORAL at 09:03

## 2017-03-01 RX ADMIN — MEMANTINE HYDROCHLORIDE 10 MG: 5 TABLET ORAL at 09:03

## 2017-03-01 RX ADMIN — MEMANTINE HYDROCHLORIDE 10 MG: 5 TABLET ORAL at 05:03

## 2017-03-01 RX ADMIN — APIXABAN 2.5 MG: 2.5 TABLET, FILM COATED ORAL at 09:03

## 2017-03-01 RX ADMIN — ASPIRIN 81 MG: 81 TABLET, COATED ORAL at 09:03

## 2017-03-01 RX ADMIN — MAGNESIUM OXIDE TAB 400 MG (241.3 MG ELEMENTAL MG) 800 MG: 400 (241.3 MG) TAB at 09:03

## 2017-03-01 RX ADMIN — APIXABAN 2.5 MG: 2.5 TABLET, FILM COATED ORAL at 05:03

## 2017-03-01 NOTE — PROGRESS NOTES
Spoke with  (Param Chen) who gave verbal permission to allow Ms. Chen's daughters (Tali and Leighann) to take the Pt home with them. She will not go to a SNF.    Michele Bull MD  Surgery Resident, PGY-1  Pager [phone]  03/01/2017

## 2017-03-01 NOTE — PT/OT/SLP PROGRESS
Physical Therapy  Treatment    Boubacar Chen   MRN: [id number]   Admitting Diagnosis: Lower limb ischemia    PT Received On: 17  PT Start Time: 1143     PT Stop Time: 1206    PT Total Time (min): 23 min       Billable Minutes:  Gait [id number] and Therapeutic Activity 9    Treatment Type: Treatment  PT/PTA: PTA     PTA Visit Number: 2       General Precautions: Standard, fall  Orthopedic Precautions: LLE weight bearing as tolerated   Braces:      Do you have any cultural, spiritual, Faith conflicts, given your current situation?: none     Subjective:  Communicated with RN prior to session.  I don't really feel like getting up now    Pain Ratin/10              Pain Rating Post-Intervention: 0/10    Objective:   Patient found with: wound vac, telemetry    Functional Mobility:  Bed Mobility:   Rolling/Turning to Left: Stand by assistance  Scooting/Bridging: Supervision  Supine to Sit: Contact Guard Assistance, Stand by Assistance    Transfers:  Sit <> Stand Assistance: Contact Guard Assistance  Sit <> Stand Assistive Device: Rolling Walker (vcs for hand placement)    Gait:   Gait Distance: 300 ft with vcs for upright posture,safety,placement of AD with wound vac in tow  Assistance 1: Stand by Assistance, Contact Guard Assistance  Gait Assistive Device: Rolling walker  Gait Pattern: 3-point gait  Gait Deviation(s): decreased abhi, decreased step length, decreased stride length, increased time in double stance    Therapeutic Activities and Exercises:   Discussed pt progress,d/c and importance of OOb mobility  Patient performed therex X 15 reps seated in bedside chair B LE AROM AP, LAQ, Hip Flexion  White board updated   Pt encouraged to ambulate in hallways 3x/day with nursing or family assistance to improve endurance  AM-PAC 6 CLICK MOBILITY  How much help from another person does this patient currently need?   1 = Unable, Total/Dependent Assistance  2 = A lot, Maximum/Moderate Assistance  3 = A  little, Minimum/Contact Guard/Supervision  4 = None, Modified [place]/Independent    Turning over in bed (including adjusting bedclothes, sheets and blankets)?: 3  Sitting down on and standing up from a chair with arms (e.g., wheelchair, bedside commode, etc.): 3  Moving from lying on back to sitting on the side of the bed?: 3  Moving to and from a bed to a chair (including a wheelchair)?: 3  Need to walk in hospital room?: 3  Climbing 3-5 steps with a railing?: 2  Total Score: 17    AM-PAC Raw Score CMS G-Code Modifier Level of Impairment Assistance   6 % Total / Unable   7 - 9 CM 80 - 100% Maximal Assist   10 - 14 CL 60 - 80% Moderate Assist   15 - 19 CK 40 - 60% Moderate Assist   20 - 22 CJ 20 - 40% Minimal Assist   23 CI 1-20% SBA / CGA   24 CH 0% Independent/ Mod I     Patient left up in chair with all lines intact, call button in reach, nsg notified and family present.    Assessment:  Boubacar Chen is a 82 y.o. female with a medical diagnosis of Lower limb ischemia and presents with continued deficits as listed below. Pt tolerated treatment fairly well and is progressing  with mobility. Pt required encouragement to participate in treatment session this date. Pt would continue to benefit from skilled PT to address overall functional mobility and goals. Goals remain appropriate      Rehab identified problem list/impairments: Rehab identified problem list/impairments: weakness, impaired endurance, impaired self care skills, impaired functional mobilty, gait instability, decreased safety awareness, impaired balance, decreased lower extremity function, impaired skin, impaired cardiopulmonary response to activity    Rehab potential is good.    Activity tolerance: Good    Discharge recommendations: Discharge Facility/Level Of Care Needs: nursing facility, skilled     Barriers to discharge: Barriers to Discharge: Decreased caregiver support    Equipment recommendations: Equipment Needed After  Discharge:  (TBD)     GOALS:   Physical Therapy Goals        Problem: Physical Therapy Goal    Goal Priority Disciplines Outcome Goal Variances Interventions   Physical Therapy Goal     PT/OT, PT Ongoing (interventions implemented as appropriate)     Description:  Goals to be met by: 3/1/17     Patient will increase functional independence with mobility by performin. Supine to sit with Contact Guard Assistance-met  2. Sit to stand transfer with Contact Guard Assistance- met  3. Gait  x 25 feet with Minimal Assistance using Rolling Walker.-met   Revised:  4. Sit to/from supine modified independent.  5. Sit to/from stand using RW prn with modified independent.  6. Stand pivot transfer with/without RW modified independent.  7. Gait on level surfaces 200' with RW Spv/SBA.  8. Lower extremity exercise program x15 reps per handout, with assistance as needed-not met                    PLAN:    Patient to be seen 5 x/week  to address the above listed problems via gait training, therapeutic activities, therapeutic exercises, neuromuscular re-education  Plan of Care expires: 17  Plan of Care reviewed with: patient, family         Param Acevedo, CEDRIC  2017

## 2017-03-01 NOTE — PLAN OF CARE
Flora received call from Dr. Bernard regarding d/c plan. MD needs Pt to d/c ASAP. Flora contacted Pt's , informed him of MD's concern for potential infection and need to d/c ASAP. Flora informed Pt's  that at this time only 1 of the 12 referred to facilities has accepted, and they have not yet received a single case agreement contract from Pt's insurance. Flora informed Pt that at this time safest d/c plan is to d/c to Pt's daughters home with HH, wound vac, and DME. Pt's  promptly hung up on Sw. Flora informed Dr. Bull of above. MD to contact Pt's  and discuss above.     If Pt's  (Pt's POA) agrees to have Pt d/c to Pt's daughters home today Pt will d/c with HH and DME and home wound vac. [facility] paperwork sent this AM for wound vac. Flora requested DME and HH orders. Flora will continue to follow.      Mame Johnston, FLORA [phone]
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
## 2017-03-01 NOTE — PLAN OF CARE
Problem: Patient Care Overview  Goal: Plan of Care Review  Outcome: Ongoing (interventions implemented as appropriate)  Remained safe throughout shift. Neurovascular checks Q4H. Wound vac to LLE intact at 125 mmHg; noted with serosanguineous drainage. Legs elevated on pillow throughout shift. Left groin dressing intact with gauze and tegaderm. Transferring to bedside commode with assist x1. Denies pain. Remained free from falls or injuries with bed alarm on, bed locked, bed in lowest position, and call bell within reach.

## 2017-03-01 NOTE — PLAN OF CARE
LAURA rec a message from pt's daughter Tali who is very concerned about her mother discharging.  Tali would like pt to d/c to her home to help care for her mother, Tali concerned because  will not provide any financial support or allow pt to go to Tali's house.    LAURA rec another call from pt's other daughter Leighann who is also very concerned about pt's d/c plan.    Reviewed both conversations with Jessica who stated she has already talked to Tali and the plan is to let  know pt has been denied from all other facilities.  CHARLENE also talked to Tali about EPS.    Mildred Rubio, RN, CCM [facility]  SICU/ENT/Vascular Surgery  Ext [phone]

## 2017-03-01 NOTE — PLAN OF CARE
Flora tried to follow up with Jade at [facility] ([phone], ext [phone]). Flora spoke to Madeline who stated Jade has left for the day. Flora requested that ETA of w/vac delivery be given. Madeline stated w/vac request is pended, but no note is indicating why it is still pended. Madeline escalated request to special team that is supposed to get in touch with Sw ASAP. [facility] aware that d/c is pending receipt of w/vac.     UPDATE 4:06 PM   Flora informed MD of above. MD aware that at this time [facility] has not approved home wound vac for Pt. [facility] has received all necessary documentation and spoken to an MD. Probable cause for delay in determination is likely linked to early leave by [facility] rep Jade, who is handling Pt's case. Flora again contacted [facility], spoke to Vero. Vero stated a  sent it to the clinical RN team at 3PM today to assess whether Pt meets criteria for a w/vac. Flora asked to speak to RN team. Vero stated RN team will call Sw. Sw provided contact information. No other means of escalating delivery of w/vac at this time. Dr. Florentino to speak to attending about alternative d/c plans if w/vac is not approved.     UPDATE 4:15 PM   Flora received call from MD stating Pt should d/c home with wet to dry dressing and transition to w/vac wound care if and when w/vac is approved. Flora contacted Pt's daughter Tali, informed her of issues getting [facility] w/vac approved. Flora informed Tali of MD's d/c plan to d/c Pt home with wet to dry and transition to w/vac if it is approved. Tali verbalized understanding. Flora contacted [facility], requested that w/vac be delivered to Pt's daughter's address [address] [place] [address]. Flora contacted [facility] , informed them of d/c today. Flora contacted Pt's RN Porsche, informed her of d/c plan - d/c today to daughter's house with Clemente BASHIR and DME, with w/vac to be delivered to Pt's home (if approved). DME has been delivered. HH is set up. [facility] aware of new drop off location. Medical team to  meet with family to answer any other questions regarding wet to dry dressings. No other Sw needs identified at this time.      Mame Johnston, JOHN [id number]

## 2017-03-01 NOTE — PLAN OF CARE
[facility]    HOME HEALTH ORDERS  FACE TO FACE ENCOUNTER    Patient Name: Boubacar Chen  YOB: 1934    PCP: Primary Doctor No   PCP Address: None  PCP Phone Number: None  PCP Fax: None    Encounter Date: 03/01/2017    Admit to Home Health    Diagnoses:  Active Hospital Problems    Diagnosis  POA    *Lower limb ischemia [I99.8]  Yes    Critical lower limb ischemia [I99.8]  Yes    Lower urinary tract infectious disease [N39.0]  Yes      Resolved Hospital Problems    Diagnosis Date Resolved POA   No resolved problems to display.       No future appointments.        I have seen and examined this patient face to face today. My clinical findings that support the need for the home health skilled services and home bound status are the following:  Requiring assistive device to leave home due to unsteady gait caused by  Surgery and dementia.  Medical restrictions requiring assistance of another human to leave home due to  Unstable ambulation, Post surgery monitoring and Wound care needs.  Mental confusion making it unsafe for patient to leave home alone due to  Dementia.    Allergies:Review of patient's allergies indicates:  No Known Allergies    Diet: cardiac diet    Activities: activity as tolerated and ambulate in house    Nursing:   SN to complete comprehensive assessment including routine vital signs. Instruct on disease process and s/s of complications to report to MD. Review/verify medication list sent home with the patient at time of discharge  and instruct patient/caregiver as needed. Frequency may be adjusted depending on start of care date.    Notify MD if SBP > 160 or < 90; DBP > 90 or < 50; HR > 120 or < 50; Temp > 101;       CONSULTS:    Physical Therapy to evaluate and treat. Evaluate for home safety and equipment needs; Establish/upgrade home exercise program. Perform / instruct on therapeutic exercises, gait training, transfer training, and Range of  Motion.  Occupational Therapy to evaluate and treat. Evaluate home environment for safety and equipment needs. Perform/Instruct on transfers, ADL training, ROM, and therapeutic exercises.  Speech Therapy  to evaluate and treat for  Cognition.   to evaluate for community resources/long-range planning.  Aide to provide assistance with personal care, ADLs, and vital signs.        WOUND CARE ORDERS  yes:  Wound Vac:   Location:  Left lateral lower leg           Dressing changes every Monday, Wednesday and Friday.        13cm x 3.5cm x 3mm    Dry gauze daily to left groin incision site.         Medications: Review discharge medications with patient and family and provide education.      Current Discharge Medication List      START taking these medications    Details   apixaban 2.5 mg Tab Take 1 tablet (2.5 mg total) by mouth 2 (two) times daily.  Qty: 60 tablet, Refills: 11      aspirin (ECOTRIN) 81 MG EC tablet Take 1 tablet (81 mg total) by mouth once daily.  Refills: 0      hydrocodone-acetaminophen 5-325mg (NORCO) 5-325 mg per tablet Take 1 tablet by mouth every 4 (four) hours as needed.  Qty: 41 tablet, Refills: 0      lisinopril (PRINIVIL,ZESTRIL) 2.5 MG tablet Take 1 tablet (2.5 mg total) by mouth once daily.  Qty: 90 tablet, Refills: 3      senna-docusate 8.6-50 mg (PERICOLACE) 8.6-50 mg per tablet Take 1 tablet by mouth 2 (two) times daily.         CONTINUE these medications which have NOT CHANGED    Details   aripiprazole (ABILIFY) 10 MG Tab Take 5 mg by mouth once daily.      atorvastatin (LIPITOR) 10 MG tablet Take 10 mg by mouth once daily.       butalbital-acetaminophen-caffeine -40 mg (FIORICET) -40 mg per tablet Take 1 tablet by mouth every 4 (four) hours as needed for Pain.      donepezil (ARICEPT) 10 MG tablet Take 10 mg by mouth once daily.       escitalopram (LEXAPRO) 10 MG tablet Take 10 mg by mouth once daily.       ginkgo biloba 60 mg Cap Take 1 tablet by mouth once daily.       metoprolol succinate (TOPROL-XL) 100 MG 24 hr tablet Take 100 mg by mouth once daily.       mirabegron (MYRBETRIQ) 25 mg Tb24 ER tablet Take 1 tablet (25 mg total) by mouth once daily.  Qty: 30 tablet, Refills: 11    Associated Diagnoses: Urinary frequency; History of recurrent UTIs; Urinary urgency      mirtazapine (REMERON) 30 MG tablet Take 30 mg by mouth every evening.      NAMENDA 10 mg Tab 10 mg 2 (two) times daily.       pantoprazole (PROTONIX) 40 MG tablet Take 1 tablet (40 mg total) by mouth once daily.  Qty: 30 tablet, Refills: 6    Associated Diagnoses: History of Helicobacter pylori infection; Dyspepsia; Epigastric pain      triamterene-hydrochlorothiazide 37.5-25 mg (DYAZIDE) 37.5-25 mg per capsule Take 1 capsule by mouth every morning.             I certify that this patient is confined to her home and needs intermittent skilled nursing care, physical therapy, speech therapy and occupational therapy.    Michele Bull MD  Surgery Resident, PGY-1  Pager [phone]  03/01/2017

## 2017-03-01 NOTE — PHYSICIAN QUERY
PT Name: Boubacar Chen  MR #: [id number]     Physician Query Form - Diagnosis Clarification    Reviewer  Ext [id number] Marlena Robb RN,BSN,CDI    This form is a permanent document in the medical record.     Query Date: March 1, 2017    By submitting this query, we are merely seeking further clarification of documentation.  Please utilize your independent clinical judgment when addressing the question(s) below.     (The Medical record reflects the following:)      Findings Supporting Clinical Information Location in Medical Record     Atrial Flutter   Chief Complaint/Reason for Admission:     Atrial Flutter      02/28/17 Cardiology Consult     Please clarify if the ______Atrial Flutter_____________________ diagnosis has been:                 [   ]   Ruled In               [   ]   Ruled In, Now Resolved               [   ]   Resolved Prior to My Assessment               [  x ]   Ruled Out               [   ]   Clinically insignificant               [   ]   Clinically undetermined               [   ]   Other/Clarification of findings:_____________________________________       Please document in your progress notes daily for the duration of treatment, until resolved, and include in your discharge summary.

## 2017-03-01 NOTE — PROGRESS NOTES
Progress Note  Vascular Surgery    Admit Date: 2/14/2017  Post-operative Day: 15 Days Post-Op  Hospital Day: 16    SUBJECTIVE:     Follow-up For:  Procedure(s) (LRB):  THROMBECTOMY (Left)  FASCIOTOMY (Left)    NAEON. Wound vac changed 2/27. Doing well this am.    OBJECTIVE:     Vital Signs (Most Recent)  Temp: 97.8 °F (36.6 °C) (03/01/17 0520)  Pulse: 103 (03/01/17 0700)  Resp: 14 (03/01/17 0520)  BP: 127/60 (03/01/17 0520)  SpO2: 95 % (03/01/17 0520)    Vital Signs Range (Last 24H):  Temp:  [96.9 °F (36.1 °C)-99.4 °F (37.4 °C)]   Pulse:  []   Resp:  [14-16]   BP: ()/(53-60)   SpO2:  [93 %-96 %]     I & O (Last 24H):    Intake/Output Summary (Last 24 hours) at 03/01/17 0745  Last data filed at 03/01/17 0500   Gross per 24 hour   Intake             1140 ml   Output             1650 ml   Net             -510 ml     Physical Exam:  General: well developed, well nourished, appears stated age, no distress  Head: normocephalic, atraumatic  Eyes:  PERRL, EOMI  Lungs:  normal respiratory effort  Heart: normal sinus  Extremities: left lower extremity wound vac to lateral incision, medial incision of fasciotomy closed; improved edema; groin incision c/d/i  Vascular: LLE: 2+DP; RLE: 2+ PT    CBC:     Recent Labs  Lab 02/27/17 2121   WBC 6.54   RBC 3.45*   HGB 10.0*   HCT 32.0*   *   MCV 93   MCH 29.0   MCHC 31.3*     CMP:     Recent Labs  Lab 02/27/17 2121 03/01/17 0518     < > 101   CALCIUM 9.3  < > 8.7   ALBUMIN 2.8*  --   --    PROT 6.6  --   --      < > 138   K 4.6  < > 4.3   CO2 30*  < > 28     < > 103   BUN 18  < > 20   CREATININE 1.1  < > 1.2   ALKPHOS 98  --   --    ALT 12  --   --    AST 22  --   --    BILITOT 0.6  --   --    < > = values in this interval not displayed.  Coagulation:   No results for input(s): INR, APTT in the last 168 hours.    Invalid input(s): PT    ASSESSMENT/PLAN:     Assessment: 83 yo F with dementia, HTN, paroxysmal atrial fibrillation with left common  femoral,popliteal embolus s/p  left femoral embolectomy of CFA, PFA and SFA with popliteal and tibial embolectomy and LLE fasciotomy.    Plan:   Neuro: Continue pain control with po pain medication   + baseline dementia, oriented to person => home medications resumed  Pulm: Saturating well on RA; continue pulmonary toilet.   Cardiovascular: CARMEN score of 6; on metoprolol; lisinopril   - Echo 2/15/17 noted EF of 20%    - New onset first degree block   - Cardiology consulted; appreciate recs  GI: cardiac diet as tolerated   : incontinent.   FEN: replace electrolytes as needed  RAAD- resolved, Cr back to baseline  Patient a nurse assist for PO intake  Heme/ID: Continue eliquis 2.5 mg po bid  PT/OT to evaluate and treat    Dispo: Stable for discharge to home with HH when home wound vac arrives vs SNF    Jahaira Florentino MD  PGY-3 General Surgery  Pager: [phone]

## 2017-03-01 NOTE — PLAN OF CARE
Problem: Physical Therapy Goal  Goal: Physical Therapy Goal  Goals to be met by: 3/1/17     Patient will increase functional independence with mobility by performin. Supine to sit with Contact Guard Assistance-met  2. Sit to stand transfer with Contact Guard Assistance- met  3. Gait x 25 feet with Minimal Assistance using Rolling Walker.-met   Revised:  4. Sit to/from supine modified independent.  5. Sit to/from stand using RW prn with modified independent.  6. Stand pivot transfer with/without RW modified independent.  7. Gait on level surfaces 200 with RW Spv/SBA.  8. Lower extremity exercise program x15 reps per handout, with assistance as needed-not met   Pt progressing towards goals. continue with PT POC.Goals remain appropriate.    Param Acevedo PTA       3/1/2017

## 2017-03-01 NOTE — PLAN OF CARE
Problem: Patient Care Overview  Goal: Plan of Care Review  Outcome: Ongoing (interventions implemented as appropriate)  Plan of care reviewed with patient.  Pt oriented to self only.  NV checks Q4 - no changes this shift.  Up to chair and up OOB a few times to bedside commode with assistance x2.  L leg incision CDI.  Wound vac to left leg intact.  Tolerating cardiac diet well.  Pt denied pain today.  Blanching redness noted to sacral area.  Bed alarm set and on while in bed. Call bell remains within reach.  Bed in lowest position.  Frequent rounds made for pt safety.  Patient remains free of any new or additional falls/injury at this time.  VSS on RA, will continue to monitor.

## 2017-03-01 NOTE — PLAN OF CARE
Flora sent referral and orders to [facility]  via [facility], who is in Pt's network, and experienced with w/vac care. Flora informed [facility] of d/c today and d/c address in [place]. Flora contacted Jordyn at [facility], requested that DME be delivered to room ASAP. Flora contacted [facility] to follow up on status of w/vac delivery. Flora spoke to Jdae at [facility]. Jade stated w/vac request has been received and is being processed currently. Flora informed Jade that d/c is pending receipt of w/vac. Jade stated it is unclear why Pt is in need of the w/vac, more information is needed from the MD about why the vac is needed. MD can call Jade ([phone], ext [phone]) to provide such information. Flora paged Dr. Chadd MD to contact [facility] to provide such information.     Mame Johnston, [facility] [phone]

## 2017-03-02 NOTE — PLAN OF CARE
Flora received call from [facility], requesting fax of nutrition notes, as [facility] does not feel low albumin levels have been fully addressed. Flora faxed nutrition notes to [facility]. Flora will continue to follow.      Mame Johnston LMSW [phone]

## 2017-03-02 NOTE — PLAN OF CARE
Problem: Fall Risk (Adult)  Goal: Identify Related Risk Factors and Signs and Symptoms  Related risk factors and signs and symptoms are identified upon initiation of Human Response Clinical Practice Guideline (CPG)   Outcome: Ongoing (interventions implemented as appropriate)  No falls this shift.  OOB with assist x 1.  Bed alarm for safety.  Very unsteady on feet.      Problem: Patient Care Overview  Goal: Plan of Care Review  Outcome: Ongoing (interventions implemented as appropriate)  Pleasantly confused.  A&O to self.  Wound vac to LLE.  Incision to left shin with staples.  Left groin dressing C/D/I.  2 BMs today.  Voided multiple times in the BSC.  Denied pain all shift.  NV checks q 4 hours - no changes/  Tolerating a cardiac diet.  OOB to chair x 2 hours.  No acute events this shift.       Cleared for D/C.  Wound vac removed.  Wet to dry dressing applied.  Reviewed medications, discharge instructions, scripts (namenda, eliquis, hydrocodone-acetaminophen and aricept) and follow up appt with patient's 2 daughters.  IV removed.  Telemetry monitor removed and returned to the nursing station.  W/c and BSC delivered to the room.  Patient left the floor with her daughters.  She left in NAD.

## 2017-03-02 NOTE — PLAN OF CARE
03/02/17 1011   Final Note   Assessment Type Final Discharge Note     Pt d/onel with home health and wound vac.  Called and lvm for [facility] w[facility] for f/u in 1 month.      Mildred Rubio RN, CCM [facility]  SICU/ENT/Vascular Surgery  Ext [phone]

## 2017-03-06 ENCOUNTER — TELEPHONE (OUTPATIENT)
Dept: VASCULAR SURGERY | Facility: CLINIC | Age: 82
End: 2017-03-06

## 2017-03-06 NOTE — DISCHARGE SUMMARY
[facility]  Discharge Summary  Vascular Surgery      Admit Date: 2/14/2017    Discharge Date and Time: 3/1/17    Attending Physician: Dr Rigo Bernard    Discharge Provider: Jahaira Florentino    Reason for Admission: ischemic leg    Procedures Performed: Procedure(s) (LRB):  THROMBECTOMY (Left)  FASCIOTOMY (Left)    Hospital Course (synopsis of major diagnoses, care, treatment, and services provided during the course of the hospital stay): 83 yo f with h/o afib admitted with left lower leg pain. She was found to have a distal CFA occlusion. She was taken to the operating room for an embolectomy and fasciotomies. She tolerated the procedure well. Cardiology was consulted for the afib. She was started on anticoagulation. One fasciotomy site was able to be closed at the bedside, the other was unable to be closed and a wound vac was placed. She tolerated a diet. Her pain was controlled. She worked with physical therapy. After being unable to be placed at a SNF she was ultimately discharged to her daughter's house with home health PT/OT and wet to dry dressings for her fasciotomy site.     Consults: cardiology, PT and OT    Significant Diagnostic Studies: Labs: CBC No results for input(s): WBC, HGB, HCT, PLT in the last 48 hours.    Final Diagnoses:    Principal Problem: Lower limb ischemia   Secondary Diagnoses:   Active Hospital Problems    Diagnosis  POA    *Lower limb ischemia [I99.8]  Yes    Critical lower limb ischemia [I99.8]  Yes    Lower urinary tract infectious disease [N39.0]  Yes      Resolved Hospital Problems    Diagnosis Date Resolved POA   No resolved problems to display.       Discharged Condition: stable    Disposition: [place] [place] [facility]    Follow Up/Patient Instructions:     Medications:  Reconciled Home Medications:   Discharge Medication List as of 3/1/2017  5:39 PM      START taking these medications    Details   apixaban 2.5 mg Tab Take 1 tablet (2.5 mg total) by mouth 2  (two) times daily., Starting 2/17/2017, Until Discontinued, Print      aspirin (ECOTRIN) 81 MG EC tablet Take 1 tablet (81 mg total) by mouth once daily., Starting 2/17/2017, Until Sat 2/17/18, OTC      hydrocodone-acetaminophen 5-325mg (NORCO) 5-325 mg per tablet Take 1 tablet by mouth every 4 (four) hours as needed., Starting 2/17/2017, Until Discontinued, Print      lisinopril (PRINIVIL,ZESTRIL) 2.5 MG tablet Take 1 tablet (2.5 mg total) by mouth once daily., Starting 2/17/2017, Until Sat 2/17/18, Print      senna-docusate 8.6-50 mg (PERICOLACE) 8.6-50 mg per tablet Take 1 tablet by mouth 2 (two) times daily., Starting 2/23/2017, Until Discontinued, OTC         CONTINUE these medications which have CHANGED    Details   donepezil (ARICEPT) 10 MG tablet Take 1 tablet (10 mg total) by mouth once daily., Starting 3/1/2017, Until Discontinued, Print         CONTINUE these medications which have NOT CHANGED    Details   aripiprazole (ABILIFY) 10 MG Tab Take 5 mg by mouth once daily., Until Discontinued, Historical Med      atorvastatin (LIPITOR) 10 MG tablet Take 10 mg by mouth once daily. , Starting 9/27/2013, Until Discontinued, Historical Med      butalbital-acetaminophen-caffeine -40 mg (FIORICET) -40 mg per tablet Take 1 tablet by mouth every 4 (four) hours as needed for Pain., Until Discontinued, Historical Med      escitalopram (LEXAPRO) 10 MG tablet Take 10 mg by mouth once daily. , Starting 9/27/2013, Until Discontinued, Historical Med      ginkgo biloba 60 mg Cap Take 1 tablet by mouth once daily., Until Discontinued, Historical Med      metoprolol succinate (TOPROL-XL) 100 MG 24 hr tablet Take 100 mg by mouth once daily. , Starting 9/27/2013, Until Discontinued, Historical Med      mirabegron (MYRBETRIQ) 25 mg Tb24 ER tablet Take 1 tablet (25 mg total) by mouth once daily., Starting 12/2/2015, Until u 12/1/16, Normal      mirtazapine (REMERON) 30 MG tablet Take 30 mg by mouth every evening.,  "Until Discontinued, Historical Med      NAMENDA 10 mg Tab 10 mg 2 (two) times daily. , Starting 10/1/2013, Until Discontinued, Historical Med      pantoprazole (PROTONIX) 40 MG tablet Take 1 tablet (40 mg total) by mouth once daily., Starting 12/8/2015, Until Thu 1/7/16, Normal      triamterene-hydrochlorothiazide 37.5-25 mg (DYAZIDE) 37.5-25 mg per capsule Take 1 capsule by mouth every morning., Until Discontinued, Historical Med             Discharge Procedure Orders  WHEELCHAIR FOR HOME USE   Order Specific Question Answer Comments   Hours in W/C per day: 18    Type of Wheelchair: Standard    Size(Width): 18"(STD adult)    Leg Support: Elevating leg rests    Lap Belt: Velcro    Accessories: Safety belt    Cushion: Basic    Height: 5' 6" (1.676 m)    Weight: 63.5 kg (140 lb)    Does patient have medical equipment at home? none    Length of need (1-99 months): 99    Please check all that apply: The patient requires the use of a w/c for activities of daily living within the Home.    Please check all that apply: Patient mobility limitations cannot be sufficiently resolved by the use of other ambulatory therapies.    Vendor: [facility] to deliver, orders sent to [facility]   Expected Date of Delivery: 3/1/2017      COMMODE FOR HOME USE   Order Specific Question Answer Comments   Type: Standard    Height: 5' 6" (1.676 m)    Weight: 63.5 kg (140 lb)    Does patient have medical equipment at home? none    Length of need (1-99 months): 99    Vendor: [facility] to deliver, orders sent to [facility]    Expected Date of Delivery: 3/1/2017      VAS US Doppler Arterial Leg Left   Standing Status: Future  Standing Exp. Date: 03/01/18     Diet general     Activity as tolerated     Call MD for:  temperature >100.4     Call MD for:  persistent nausea and vomiting or diarrhea     Call MD for:  severe uncontrolled pain     Call MD for:  redness, tenderness, or signs of infection (pain, swelling, redness, odor or green/yellow " discharge around incision site)     Call MD for:  difficulty breathing or increased cough     Call MD for:  worsening rash     Call MD for:  persistent dizziness, light-headedness, or visual disturbances     Call MD for:  increased confusion or weakness     Remove dressing in 24 hours   Order Comments: Daily dry gauze to left groin.     Three times weekly wound vac changes to left leg incision.       Follow-up Information     Follow up with Rigo Bernard MD In 1 month.    Specialty:  Vascular Surgery    Contact information:    [address]  [facility] [address] [phone]          Follow up with Trellise.    Specialty:  Home Health Services    Contact information:    [address] [place] [address]  [phone]            Vascular lab studies prior to follow-up appointment: Duplex Ultrasound

## 2017-03-06 NOTE — NURSING
Left message on patient's daughter Leighann voicemail that call was returned and to call back for an appointment with callback number provided. GSL/LPN

## 2017-03-07 PROBLEM — N18.30 CKD (CHRONIC KIDNEY DISEASE) STAGE 3, GFR 30-59 ML/MIN: Status: ACTIVE | Noted: 2017-03-07

## 2017-03-07 PROBLEM — I50.9 CHF (CONGESTIVE HEART FAILURE): Chronic | Status: ACTIVE | Noted: 2017-02-15

## 2017-03-07 PROBLEM — I50.9 CHF (CONGESTIVE HEART FAILURE): Status: ACTIVE | Noted: 2017-02-15

## 2017-03-07 PROBLEM — I70.229 CRITICAL LOWER LIMB ISCHEMIA: Chronic | Status: ACTIVE | Noted: 2017-02-14

## 2017-03-07 PROBLEM — I82.432 ACUTE DEEP VEIN THROMBOSIS (DVT) OF POPLITEAL VEIN OF LEFT LOWER EXTREMITY: Status: ACTIVE | Noted: 2017-03-07

## 2017-03-07 PROBLEM — N18.30 CKD (CHRONIC KIDNEY DISEASE) STAGE 3, GFR 30-59 ML/MIN: Chronic | Status: ACTIVE | Noted: 2017-03-07

## 2017-03-07 PROBLEM — I99.8 LOWER LIMB ISCHEMIA: Status: RESOLVED | Noted: 2017-02-14 | Resolved: 2017-03-07

## 2017-03-07 RX ORDER — SPIRONOLACTONE 25 MG/1
TABLET ORAL
Refills: 2 | COMMUNITY
Start: 2017-02-15 | End: 2017-03-08 | Stop reason: SDUPTHER

## 2017-03-07 RX ORDER — FERROUS SULFATE 325(65) MG
1 TABLET ORAL 2 TIMES DAILY
Refills: 3 | COMMUNITY
Start: 2017-01-25 | End: 2017-03-08 | Stop reason: SDUPTHER

## 2017-03-08 ENCOUNTER — OFFICE VISIT (OUTPATIENT)
Dept: FAMILY MEDICINE | Facility: CLINIC | Age: 82
End: 2017-03-08
Payer: COMMERCIAL

## 2017-03-08 ENCOUNTER — LAB VISIT (OUTPATIENT)
Dept: LAB | Facility: HOSPITAL | Age: 82
End: 2017-03-08
Attending: INTERNAL MEDICINE
Payer: COMMERCIAL

## 2017-03-08 ENCOUNTER — OFFICE VISIT (OUTPATIENT)
Dept: VASCULAR SURGERY | Facility: CLINIC | Age: 82
End: 2017-03-08
Payer: COMMERCIAL

## 2017-03-08 ENCOUNTER — HOSPITAL ENCOUNTER (OUTPATIENT)
Dept: VASCULAR SURGERY | Facility: CLINIC | Age: 82
Discharge: HOME | End: 2017-03-08
Attending: SURGERY
Payer: COMMERCIAL

## 2017-03-08 VITALS
OXYGEN SATURATION: 98 % | TEMPERATURE: 98 F | HEIGHT: 64 IN | BODY MASS INDEX: 29.2 KG/M2 | WEIGHT: 171.06 LBS | SYSTOLIC BLOOD PRESSURE: 122 MMHG | DIASTOLIC BLOOD PRESSURE: 76 MMHG | HEART RATE: 96 BPM

## 2017-03-08 VITALS
BODY MASS INDEX: 29.19 KG/M2 | SYSTOLIC BLOOD PRESSURE: 103 MMHG | DIASTOLIC BLOOD PRESSURE: 64 MMHG | WEIGHT: 171 LBS | HEART RATE: 92 BPM | TEMPERATURE: 96 F | HEIGHT: 64 IN

## 2017-03-08 DIAGNOSIS — N18.30 CKD (CHRONIC KIDNEY DISEASE) STAGE 3, GFR 30-59 ML/MIN: ICD-10-CM

## 2017-03-08 DIAGNOSIS — I48.0 PAROXYSMAL ATRIAL FIBRILLATION: Chronic | ICD-10-CM

## 2017-03-08 DIAGNOSIS — S12.100D: ICD-10-CM

## 2017-03-08 DIAGNOSIS — I70.229 CRITICAL LOWER LIMB ISCHEMIA: ICD-10-CM

## 2017-03-08 DIAGNOSIS — D64.9 ANEMIA, UNSPECIFIED TYPE: ICD-10-CM

## 2017-03-08 DIAGNOSIS — I48.0 PAROXYSMAL ATRIAL FIBRILLATION: Primary | Chronic | ICD-10-CM

## 2017-03-08 DIAGNOSIS — F02.80 ALZHEIMER'S DEMENTIA WITHOUT BEHAVIORAL DISTURBANCE, UNSPECIFIED TIMING OF DEMENTIA ONSET: ICD-10-CM

## 2017-03-08 DIAGNOSIS — I50.22 CHRONIC SYSTOLIC CONGESTIVE HEART FAILURE: ICD-10-CM

## 2017-03-08 DIAGNOSIS — R19.7 DIARRHEA, UNSPECIFIED TYPE: ICD-10-CM

## 2017-03-08 DIAGNOSIS — Z48.89 ENCOUNTER FOR SURGICAL FOLLOW-UP CARE: Primary | ICD-10-CM

## 2017-03-08 DIAGNOSIS — R32 URINARY INCONTINENCE, UNSPECIFIED TYPE: ICD-10-CM

## 2017-03-08 DIAGNOSIS — G30.9 ALZHEIMER'S DEMENTIA WITHOUT BEHAVIORAL DISTURBANCE, UNSPECIFIED TIMING OF DEMENTIA ONSET: ICD-10-CM

## 2017-03-08 DIAGNOSIS — R10.13 EPIGASTRIC ABDOMINAL PAIN: ICD-10-CM

## 2017-03-08 DIAGNOSIS — I82.432 ACUTE DEEP VEIN THROMBOSIS (DVT) OF POPLITEAL VEIN OF LEFT LOWER EXTREMITY: ICD-10-CM

## 2017-03-08 LAB
ALBUMIN SERPL BCP-MCNC: 3.2 G/DL
ALP SERPL-CCNC: 104 U/L
ALT SERPL W/O P-5'-P-CCNC: 9 U/L
ANION GAP SERPL CALC-SCNC: 10 MMOL/L
AST SERPL-CCNC: 18 U/L
BASOPHILS # BLD AUTO: 0.03 K/UL
BASOPHILS NFR BLD: 0.4 %
BILIRUB SERPL-MCNC: 0.4 MG/DL
BUN SERPL-MCNC: 24 MG/DL
CALCIUM SERPL-MCNC: 9.3 MG/DL
CHLORIDE SERPL-SCNC: 104 MMOL/L
CO2 SERPL-SCNC: 24 MMOL/L
CREAT SERPL-MCNC: 1.5 MG/DL
DIFFERENTIAL METHOD: ABNORMAL
EOSINOPHIL # BLD AUTO: 0.3 K/UL
EOSINOPHIL NFR BLD: 4.4 %
ERYTHROCYTE [DISTWIDTH] IN BLOOD BY AUTOMATED COUNT: 14.4 %
EST. GFR  (AFRICAN AMERICAN): 37.1 ML/MIN/1.73 M^2
EST. GFR  (NON AFRICAN AMERICAN): 32.2 ML/MIN/1.73 M^2
FERRITIN SERPL-MCNC: 153 NG/ML
GLUCOSE SERPL-MCNC: 111 MG/DL
HCT VFR BLD AUTO: 35.8 %
HGB BLD-MCNC: 11 G/DL
IRON SERPL-MCNC: 46 UG/DL
LYMPHOCYTES # BLD AUTO: 2.4 K/UL
LYMPHOCYTES NFR BLD: 35.6 %
MCH RBC QN AUTO: 28.4 PG
MCHC RBC AUTO-ENTMCNC: 30.7 %
MCV RBC AUTO: 92 FL
MONOCYTES # BLD AUTO: 0.5 K/UL
MONOCYTES NFR BLD: 7.7 %
NEUTROPHILS # BLD AUTO: 3.5 K/UL
NEUTROPHILS NFR BLD: 51.5 %
PLATELET # BLD AUTO: 386 K/UL
PMV BLD AUTO: 9.2 FL
POTASSIUM SERPL-SCNC: 4.1 MMOL/L
PROT SERPL-MCNC: 7 G/DL
RBC # BLD AUTO: 3.88 M/UL
SATURATED IRON: 11 %
SODIUM SERPL-SCNC: 138 MMOL/L
TOTAL IRON BINDING CAPACITY: 407 UG/DL
TRANSFERRIN SERPL-MCNC: 275 MG/DL
WBC # BLD AUTO: 6.85 K/UL

## 2017-03-08 PROCEDURE — 93926 LOWER EXTREMITY STUDY: CPT | Mod: S$GLB,,, | Performed by: SURGERY

## 2017-03-08 PROCEDURE — 82728 ASSAY OF FERRITIN: CPT

## 2017-03-08 PROCEDURE — 1160F RVW MEDS BY RX/DR IN RCRD: CPT | Mod: S$GLB,,, | Performed by: INTERNAL MEDICINE

## 2017-03-08 PROCEDURE — 36415 COLL VENOUS BLD VENIPUNCTURE: CPT | Mod: PO

## 2017-03-08 PROCEDURE — 1157F ADVNC CARE PLAN IN RCRD: CPT | Mod: S$GLB,,, | Performed by: INTERNAL MEDICINE

## 2017-03-08 PROCEDURE — 99999 PR PBB SHADOW E&M-EST. PATIENT-LVL III: CPT | Mod: PBBFAC,,, | Performed by: SURGERY

## 2017-03-08 PROCEDURE — 99999 PR PBB SHADOW E&M-EST. PATIENT-LVL III: CPT | Mod: PBBFAC,,, | Performed by: INTERNAL MEDICINE

## 2017-03-08 PROCEDURE — 1159F MED LIST DOCD IN RCRD: CPT | Mod: S$GLB,,, | Performed by: INTERNAL MEDICINE

## 2017-03-08 PROCEDURE — 99024 POSTOP FOLLOW-UP VISIT: CPT | Mod: S$GLB,,, | Performed by: SURGERY

## 2017-03-08 PROCEDURE — 85025 COMPLETE CBC W/AUTO DIFF WBC: CPT

## 2017-03-08 PROCEDURE — 84466 ASSAY OF TRANSFERRIN: CPT

## 2017-03-08 PROCEDURE — 83540 ASSAY OF IRON: CPT

## 2017-03-08 PROCEDURE — 99215 OFFICE O/P EST HI 40 MIN: CPT | Mod: S$GLB,,, | Performed by: INTERNAL MEDICINE

## 2017-03-08 PROCEDURE — 80053 COMPREHEN METABOLIC PANEL: CPT

## 2017-03-08 RX ORDER — SPIRONOLACTONE 25 MG/1
TABLET ORAL
Qty: 90 TABLET | Refills: 3 | Status: SHIPPED | OUTPATIENT
Start: 2017-03-08 | End: 2017-03-22 | Stop reason: SDUPTHER

## 2017-03-08 RX ORDER — DONEPEZIL HYDROCHLORIDE 10 MG/1
10 TABLET, FILM COATED ORAL DAILY
Qty: 30 TABLET | Refills: 11 | Status: SHIPPED | OUTPATIENT
Start: 2017-03-08 | End: 2017-03-08 | Stop reason: SDUPTHER

## 2017-03-08 RX ORDER — TRIAMTERENE AND HYDROCHLOROTHIAZIDE 37.5; 25 MG/1; MG/1
1 CAPSULE ORAL EVERY MORNING
Qty: 90 CAPSULE | Refills: 3 | Status: SHIPPED | OUTPATIENT
Start: 2017-03-08 | End: 2017-11-09

## 2017-03-08 RX ORDER — MIRTAZAPINE 30 MG/1
30 TABLET, FILM COATED ORAL NIGHTLY
Qty: 90 TABLET | Refills: 3 | Status: SHIPPED | OUTPATIENT
Start: 2017-03-08 | End: 2017-07-18

## 2017-03-08 RX ORDER — MEMANTINE HYDROCHLORIDE 10 MG/1
10 TABLET ORAL 2 TIMES DAILY
Qty: 180 TABLET | Refills: 3 | Status: SHIPPED | OUTPATIENT
Start: 2017-03-08 | End: 2017-09-13 | Stop reason: SDUPTHER

## 2017-03-08 RX ORDER — FERROUS SULFATE 325(65) MG
1 TABLET ORAL 2 TIMES DAILY
Refills: 3 | COMMUNITY
Start: 2017-03-08 | End: 2017-05-03

## 2017-03-08 RX ORDER — ATORVASTATIN CALCIUM 10 MG/1
10 TABLET, FILM COATED ORAL DAILY
Qty: 90 TABLET | Refills: 3 | Status: SHIPPED | OUTPATIENT
Start: 2017-03-08 | End: 2017-11-07 | Stop reason: SDUPTHER

## 2017-03-08 RX ORDER — METOPROLOL SUCCINATE 50 MG/1
50 TABLET, EXTENDED RELEASE ORAL DAILY
Qty: 30 TABLET | Refills: 11 | Status: SHIPPED | OUTPATIENT
Start: 2017-03-08 | End: 2017-11-07 | Stop reason: SDUPTHER

## 2017-03-08 RX ORDER — ESCITALOPRAM OXALATE 10 MG/1
10 TABLET ORAL DAILY
Qty: 90 TABLET | Refills: 3 | Status: SHIPPED | OUTPATIENT
Start: 2017-03-08 | End: 2017-11-07 | Stop reason: SDUPTHER

## 2017-03-08 RX ORDER — DONEPEZIL HYDROCHLORIDE 10 MG/1
10 TABLET, FILM COATED ORAL DAILY
Qty: 30 TABLET | Refills: 11 | Status: SHIPPED | OUTPATIENT
Start: 2017-03-08

## 2017-03-08 RX ORDER — ARIPIPRAZOLE 10 MG/1
10 TABLET ORAL DAILY
Qty: 90 TABLET | Refills: 3 | Status: SHIPPED | OUTPATIENT
Start: 2017-03-08 | End: 2017-11-07 | Stop reason: SDUPTHER

## 2017-03-08 NOTE — PROGRESS NOTES
Assessment & Plan  Problem List Items Addressed This Visit        Neuro    Dementia (Chronic) - refilled abilify, namenda, aricept; remeron for nighttime use.  lexapro as well.    Relevant Medications    aripiprazole (ABILIFY) 10 MG Tab    escitalopram oxalate (LEXAPRO) 10 MG tablet    mirtazapine (REMERON) 30 MG tablet    memantine (NAMENDA) 10 MG Tab    donepezil (ARICEPT) 10 MG tablet    Epigastric abdominal pain - not taking pantoprazole.       Cardiac    Paroxysmal atrial fibrillation - Primary (Chronic) - sinus rhythm today. Don't think she is taking metoprolol and with home logs with readings in the low 90s, it's acceptable but don't want to be uncontrolled.  Restart metoprolol; previously on 100, will restart at 50    Relevant Medications    metoprolol succinate (TOPROL-XL) 50 MG 24 hr tablet    apixaban 2.5 mg Tab    Critical lower limb ischemia left s/p thrombectomy (Chronic) - managed by vascular surgery; home health following; vascular called in [facility] to start for presumed cellulitis.    CHF (congestive heart failure) (Chronic) - will try to get old cardiology records but meds support a dx of CHF. Pt and daughter unclear on the details.  Stay on spironolactone; restart beta blocker; on statin    Relevant Medications    spironolactone (ALDACTONE) 25 MG tablet    triamterene-hydrochlorothiazide 37.5-25 mg (DYAZIDE) 37.5-25 mg per capsule    Other Relevant Orders    Comprehensive metabolic panel    Acute deep vein thrombosis (DVT) of popliteal vein of left lower extremity - DVT, AFib, and arterial clot - apixaban refilled.    Relevant Medications    apixaban 2.5 mg Tab       Renal    CKD (chronic kidney disease) stage 3, GFR 30-59 ml/min (Chronic)    Relevant Orders    Comprehensive metabolic panel       Fluids/Electrolytes/Nutrition/GI    Diarrhea (Chronic)       Musculoskeletal and Integument    Odontoid fracture with routine healing      Other Visit Diagnoses     Urinary incontinence, unspecified type      - check UA and UCx; to start bactrim rx'd by vascular    Relevant Orders    Urinalysis    Urine culture    Anemia, unspecified type  - taking iron; check CBC and iron stores; on Iron BID      Relevant Medications    ferrous sulfate 325 mg (65 mg iron) Tab tablet    Other Relevant Orders    CBC auto differential    Ferritin    Iron and TIBC          Medications Discontinued During This Encounter   Medication Reason    ginkgo biloba 60 mg Cap     butalbital-acetaminophen-caffeine -40 mg (FIORICET) -40 mg per tablet     lisinopril (PRINIVIL,ZESTRIL) 2.5 MG tablet     pantoprazole (PROTONIX) 40 MG tablet     mirabegron (MYRBETRIQ) 25 mg Tb24 ER tablet     metoprolol succinate (TOPROL-XL) 100 MG 24 hr tablet Reorder    apixaban 2.5 mg Tab Reorder    aripiprazole (ABILIFY) 10 MG Tab Reorder    atorvastatin (LIPITOR) 10 MG tablet Reorder    donepezil (ARICEPT) 10 MG tablet Reorder    escitalopram (LEXAPRO) 10 MG tablet Reorder    ferrous sulfate 325 mg (65 mg iron) Tab tablet Reorder    mirtazapine (REMERON) 30 MG tablet Reorder    NAMENDA 10 mg Tab Reorder    spironolactone (ALDACTONE) 25 MG tablet Reorder    triamterene-hydrochlorothiazide 37.5-25 mg (DYAZIDE) 37.5-25 mg per capsule Reorder    apixaban 2.5 mg Tab Reorder    donepezil (ARICEPT) 10 MG tablet Reorder       Follow-up: Return in about 1 month (around 4/8/2017).      =================================================================      Chief Complaint   Patient presents with    [facility] Care       HPI  Boubacar is a 82 y.o. female, last appointment with this clinic was Visit date not found.    No LMP recorded. Patient has had a hysterectomy.    EGD 12/30/2015 biopsies negative.  Hiatal hernia.  constipation.  1/6/2015 colonoscopy internal hemorrhoids.  DVT; 2/14/2017 [facility] Deep venous thrombosis left popliteal vein nearly completely occlusive; Left popliteal cyst; on anticoagulation for this as well as for AFib.  peripheral  artery disease legs: 2/14/2017 LE arterial dopplers: No elevated systolic velocity suggesting hemodynamically significant narrowing in the visualized arteries of the right lower extremity. Left lower extremity; occluded left common femoral artery and proximal superficial femoral artery with minimal flow seen in the mid to distal superficial femoral artery and proximal popliteal artery, with occluded distal popliteal artery and anterior tibial and peroneal are not visualized likely occluded and very low flow in the posterior tibial artery  2/15/2017 underwent thrombectomy and fasciotomy  C spine fracture: hx of C spine fracture after a fall; daughter notes pt did not wear the C spine collar during recovery and it fractured again; surgeon did not want to do re-do.  11/19/2016 CT C spine: Incompletely healed chronic appearing type II dens fracture which is status post ORIF as as discussed above. No definite new additional fracture is identified in the prior studies are available for comparison. There severe cervical spondylosis.  Incidental findings also include vascular calcification and a heterogeneous goiter  12/9/2015 renal US: Right renal cysts.  Elevated resistive indices suggesting intrinsic renal disease.  No hydronephrosis.  Urinary bladder empty at the time of the exam.  AFib, anticoagulation; found 2/2017 hospitalization  CHF: 2/15/2017 TTE: CONCLUSIONS 1 - Eccentric hypertrophy. 2 - Severely depressed left ventricular systolic function (EF 20-25%). 3 - Moderate left atrial enlargement. 4 - Moderately depressed right ventricular systolic function . 5 - Mild mitral regurgitation. 6 - Intermediate central venous pressure.   hx of CVA  dementia  anxiety    Here accompanied by Tali her daughter from whom the history is obtained.  Pt is pleasant and answers appropriately to questions but not reliable historian.  Was living in [facility] assisted living.  Unfortunately, pt without enough money to continue to live  there so now living with daughter Tali.  For the foreseeable future she'll be living with Tali.  After the thrombectomy and fasciotomy, open wound.  Had been an issue getting approval for the wound vac which was finally approved and will be placed by home health tomorrow.  She saw vascular surgery in follow up today - his plan was rx of bactrim for the wound as well as for daughter's complaint of the pt having strong smelling urine.  Has not started abx yet.  She brings in a large pill card prepared by [employer]. It's a little unclear which medications she's been taking and which she hasn't - reviewed our MAR and tried to reconcile with what she has.  The pills - we tried to identify them based on number imprinted, able to identify all but 1.  It's unclear if apixiban is one of them.    She is taking  AM:  ASA  abilify  lipitor  aricept  lexapro  Iron  b12  Spironolactone  HCTZ    PM:  remeron  Iron  namenda  Unidentified pill    So not taking lisinopril, not taking metoprolol, not taking protonix, not taking mirabegron.    Daughter wonders if she truly needs all these meds.  Has home log of BP/HR on all these meds - BPs are good < 120/80, HR variable from 80s to low 90s.  Not on beta blocker apparently.  Unclear the dx of CHF - daughter reports she had had cardiology evaluation previously - entered into pt care team.  Had previously seen neurology for the dementia.  Daughter notes strong smelling urine.  Pt with urinary incontinence.  Daughter does not think the Myrbetriq that helpful and does not appear to be on current pill card.    Patient Care Team:  Fabian Mejia MD as PCP - General (Internal Medicine)  Ed Ness MD as Consulting Physician (Cardiology)  Neal Bond MD as Consulting Physician (Neurology)  Rigo Bernard MD as Consulting Physician (Vascular Surgery)    Patient Active Problem List    Diagnosis Date Noted    Acute deep vein thrombosis (DVT) of popliteal vein of left lower  extremity 03/07/2017 2/14/2017 LE US Deep venous thrombosis left popliteal vein nearly completely occlusive; Left popliteal cyst      CKD (chronic kidney disease) stage 3, GFR 30-59 ml/min 03/07/2017 12/9/2015 renal US: Right renal cysts.  Elevated resistive indices suggesting intrinsic renal disease.  No hydronephrosis.  Urinary bladder empty at the time of the exam.      Dementia     CHF (congestive heart failure) 02/15/2017     2/15/2017 TTE: CONCLUSIONS 1 - Eccentric hypertrophy. 2 - Severely depressed left ventricular systolic function (EF 20-25%). 3 - Moderate left atrial enlargement. 4 - Moderately depressed right ventricular systolic function . 5 - Mild mitral regurgitation. 6 - Intermediate central venous pressure.       Critical lower limb ischemia left s/p thrombectomy 02/14/2017     limb ischemia due to left femoral arterial embolus   2/14/2017 LE arterial dopplers: No elevated systolic velocity suggesting hemodynamically significant narrowing in the visualized arteries of the right lower extremity. Left lower extremity; occluded left common femoral artery and proximal superficial femoral artery with minimal flow seen in the mid to distal superficial femoral artery and proximal popliteal artery, with occluded distal popliteal artery and anterior tibial and peroneal are not visualized likely occluded and very low flow in the posterior tibial artery  2/15/2017 underwent thrombectomy and fasciotomy      Paroxysmal atrial fibrillation     Odontoid fracture with routine healing 11/19/2016 11/19/2016 CT C spine: Incompletely healed chronic appearing type II dens fracture which is status post ORIF as as discussed above. No definite new additional fracture is identified in the prior studies are available for comparison. There severe cervical spondylosis.  Incidental findings also include vascular calcification and a heterogeneous goiter      Epigastric abdominal pain 12/30/2015     EGD  12/30/2015 biopsies negative.  Hiatal hernia.      Diarrhea 11/03/2014 1/6/2015 colonoscopy internal hemorrhoids.      Lower urinary tract infectious disease 10/07/2013       PAST MEDICAL HISTORY:  Past Medical History:   Diagnosis Date    Anxiety     Arthritis     Dementia     Former smoker     Hyperlipidemia     Hypertension     Stroke     at age 58       PAST SURGICAL HISTORY:  Past Surgical History:   Procedure Laterality Date    ANTERIOR VAGINAL REPAIR  1960's    COLONOSCOPY  2008    COLONOSCOPY N/A 1/5/2016    Procedure: COLONOSCOPY;  Surgeon: Daniel Alicea MD;  Location: [facility];  Service: Endoscopy;  Laterality: N/A;    HYSTERECTOMY      total    JOINT REPLACEMENT      bilateral hip    UPPER GASTROINTESTINAL ENDOSCOPY  2008     h. pylori     Family History   Problem Relation Age of Onset    Colon cancer Mother      in late 60s    Colon cancer Father        SOCIAL HISTORY:  Social History     Social History    Marital status:      Spouse name: N/A    Number of children: N/A    Years of education: N/A     Occupational History    Not on file.     Social History Main Topics    Smoking status: Former Smoker     Packs/day: 1.00     Years: 25.00     Types: Cigarettes     Quit date: 10/7/1998    Smokeless tobacco: Not on file    Alcohol use No      Comment: occ    Drug use: No    Sexual activity: Not on file     Other Topics Concern    Not on file     Social History Narrative       ALLERGIES AND MEDICATIONS: updated and reviewed.  Review of patient's allergies indicates:  No Known Allergies  Current Outpatient Prescriptions   Medication Sig Dispense Refill    apixaban 2.5 mg Tab Take 1 tablet (2.5 mg total) by mouth 2 (two) times daily. 60 tablet 11    aripiprazole (ABILIFY) 10 MG Tab Take 5 mg by mouth once daily.      aspirin (ECOTRIN) 81 MG EC tablet Take 1 tablet (81 mg total) by mouth once daily.  0    atorvastatin (LIPITOR) 10 MG tablet Take 10 mg by mouth once  daily.       butalbital-acetaminophen-caffeine -40 mg (FIORICET) -40 mg per tablet Take 1 tablet by mouth every 4 (four) hours as needed for Pain.      donepezil (ARICEPT) 10 MG tablet Take 1 tablet (10 mg total) by mouth once daily. 30 tablet 11    escitalopram (LEXAPRO) 10 MG tablet Take 10 mg by mouth once daily.       ferrous sulfate 325 mg (65 mg iron) Tab tablet Take 1 tablet by mouth 2 (two) times daily.  3    ginkgo biloba 60 mg Cap Take 1 tablet by mouth once daily.      hydrocodone-acetaminophen 5-325mg (NORCO) 5-325 mg per tablet Take 1 tablet by mouth every 4 (four) hours as needed. 41 tablet 0    lisinopril (PRINIVIL,ZESTRIL) 2.5 MG tablet Take 1 tablet (2.5 mg total) by mouth once daily. 90 tablet 3    metoprolol succinate (TOPROL-XL) 100 MG 24 hr tablet Take 100 mg by mouth once daily.       mirabegron (MYRBETRIQ) 25 mg Tb24 ER tablet Take 1 tablet (25 mg total) by mouth once daily. 30 tablet 11    mirtazapine (REMERON) 30 MG tablet Take 30 mg by mouth every evening.      NAMENDA 10 mg Tab 10 mg 2 (two) times daily.       pantoprazole (PROTONIX) 40 MG tablet Take 1 tablet (40 mg total) by mouth once daily. 30 tablet 6    senna-docusate 8.6-50 mg (PERICOLACE) 8.6-50 mg per tablet Take 1 tablet by mouth 2 (two) times daily.      spironolactone (ALDACTONE) 25 MG tablet TAKE ONE TABLET BY MOUTH ON MONDAY WEDNESDAY FRIDAY AND SATURDAY  2    triamterene-hydrochlorothiazide 37.5-25 mg (DYAZIDE) 37.5-25 mg per capsule Take 1 capsule by mouth every morning.       No current facility-administered medications for this visit.    not taking lisinopril, protonix, myrbetriq, toprol    Review of Systems   Constitutional: Negative for fever, malaise/fatigue and weight loss.   HENT: Negative for congestion.    Eyes: Negative for blurred vision and pain.   Respiratory: Negative for shortness of breath and wheezing.    Cardiovascular: Negative for chest pain, palpitations and leg swelling.  "  Gastrointestinal: Negative for abdominal pain, blood in stool, constipation, diarrhea and heartburn.   Genitourinary: Positive for frequency and urgency. Negative for dysuria and hematuria.        Strong odor   Musculoskeletal: Negative for joint pain.   Skin: Positive for rash.        Over the site of the wound   Neurological: Negative for tingling, focal weakness, weakness and headaches.   Psychiatric/Behavioral: Positive for memory loss. Negative for depression. The patient has insomnia. The patient is not nervous/anxious.        Physical Exam   Vitals:    03/08/17 1542   BP: 122/76   Pulse: 96   Temp: 98.3 °F (36.8 °C)   Weight: 77.6 kg (171 lb 1.2 oz)   Height: 5' 3.5" (1.613 m)    Body mass index is 29.83 kg/(m^2).  Weight: 77.6 kg (171 lb 1.2 oz)   Height: 5' 3.5" (161.3 cm)     Physical Exam   Constitutional: She is oriented to person, place, and time. She appears well-developed and well-nourished. No distress.   Eyes: EOM are normal.   Cardiovascular: Normal rate, regular rhythm and normal heart sounds.    No murmur heard.  Pulmonary/Chest: Effort normal and breath sounds normal.   Musculoskeletal: Normal range of motion. She exhibits edema.   1+ edema bilateral legs. The left leg is covered with fresh gauze and wrap and I did not visualize the wound. She was recently seen by vascular surgery   Neurological: She is alert and oriented to person, place, and time. Coordination normal.   Skin: Skin is warm and dry.   Psychiatric: She has a normal mood and affect. Her behavior is normal.   Pleasant, appropriate response to questions.  Did not do formal MMSE or ask questions re: orientation     "

## 2017-03-08 NOTE — PROGRESS NOTES
Boubacar Chen  03/08/2017    HPI:  Patient is a 82 y.o. female with dementia, HTN, paroxysmal atrial fibrillation with left common femoral, popliteal embolus now s/p LLE thrombectomy and faciotomy who is here today for evaluation of fasciotomy wound.  Pt's daughters are here and report Home Health is visiting for wound care and PT ([facility]: [phone]).  The patient's wound vac just arrived and the Home Health agency is requesting wound vac orders.      No MI/stroke  Tobacco use: No    Past Medical History:   Diagnosis Date    Anxiety     Arthritis     Dementia     Former smoker     Hyperlipidemia     Hypertension     Stroke     at age 58     Past Surgical History:   Procedure Laterality Date    ANTERIOR VAGINAL REPAIR  1960's    COLONOSCOPY  2008    COLONOSCOPY N/A 1/5/2016    Procedure: COLONOSCOPY;  Surgeon: Daniel Alicea MD;  Location: [facility];  Service: Endoscopy;  Laterality: N/A;    HYSTERECTOMY      total    JOINT REPLACEMENT      bilateral hip    UPPER GASTROINTESTINAL ENDOSCOPY  2008     h. pylori     Family History   Problem Relation Age of Onset    Colon cancer Mother      in late 60s    Colon cancer Father      Social History     Social History    Marital status:      Spouse name: N/A    Number of children: N/A    Years of education: N/A     Occupational History    Not on file.     Social History Main Topics    Smoking status: Former Smoker     Packs/day: 1.00     Years: 25.00     Types: Cigarettes     Quit date: 10/7/1998    Smokeless tobacco: Not on file    Alcohol use No      Comment: occ    Drug use: No    Sexual activity: Not on file     Other Topics Concern    Not on file     Social History Narrative       Current Outpatient Prescriptions:     apixaban 2.5 mg Tab, Take 1 tablet (2.5 mg total) by mouth 2 (two) times daily., Disp: 60 tablet, Rfl: 11    aripiprazole (ABILIFY) 10 MG Tab, Take 5 mg by mouth once daily., Disp: , Rfl:     aspirin  (ECOTRIN) 81 MG EC tablet, Take 1 tablet (81 mg total) by mouth once daily., Disp: , Rfl: 0    atorvastatin (LIPITOR) 10 MG tablet, Take 10 mg by mouth once daily. , Disp: , Rfl:     butalbital-acetaminophen-caffeine -40 mg (FIORICET) -40 mg per tablet, Take 1 tablet by mouth every 4 (four) hours as needed for Pain., Disp: , Rfl:     donepezil (ARICEPT) 10 MG tablet, Take 1 tablet (10 mg total) by mouth once daily., Disp: 30 tablet, Rfl: 11    escitalopram (LEXAPRO) 10 MG tablet, Take 10 mg by mouth once daily. , Disp: , Rfl:     ferrous sulfate 325 mg (65 mg iron) Tab tablet, Take 1 tablet by mouth 2 (two) times daily., Disp: , Rfl: 3    ginkgo biloba 60 mg Cap, Take 1 tablet by mouth once daily., Disp: , Rfl:     hydrocodone-acetaminophen 5-325mg (NORCO) 5-325 mg per tablet, Take 1 tablet by mouth every 4 (four) hours as needed., Disp: 41 tablet, Rfl: 0    lisinopril (PRINIVIL,ZESTRIL) 2.5 MG tablet, Take 1 tablet (2.5 mg total) by mouth once daily., Disp: 90 tablet, Rfl: 3    metoprolol succinate (TOPROL-XL) 100 MG 24 hr tablet, Take 100 mg by mouth once daily. , Disp: , Rfl:     mirtazapine (REMERON) 30 MG tablet, Take 30 mg by mouth every evening., Disp: , Rfl:     NAMENDA 10 mg Tab, 10 mg 2 (two) times daily. , Disp: , Rfl:     senna-docusate 8.6-50 mg (PERICOLACE) 8.6-50 mg per tablet, Take 1 tablet by mouth 2 (two) times daily., Disp: , Rfl:     spironolactone (ALDACTONE) 25 MG tablet, TAKE ONE TABLET BY MOUTH ON MONDAY WEDNESDAY FRIDAY AND SATURDAY, Disp: , Rfl: 2    triamterene-hydrochlorothiazide 37.5-25 mg (DYAZIDE) 37.5-25 mg per capsule, Take 1 capsule by mouth every morning., Disp: , Rfl:     mirabegron (MYRBETRIQ) 25 mg Tb24 ER tablet, Take 1 tablet (25 mg total) by mouth once daily., Disp: 30 tablet, Rfl: 11    pantoprazole (PROTONIX) 40 MG tablet, Take 1 tablet (40 mg total) by mouth once daily., Disp: 30 tablet, Rfl: 6    REVIEW OF SYSTEMS:  General: negative; ENT:  negative; Allergy and Immunology: negative; Hematological and Lymphatic: Negative; Endocrine: negative; Respiratory: no cough, shortness of breath, or wheezing; Cardiovascular: no chest pain or dyspnea on exertion; Gastrointestinal: no abdominal pain/back, change in bowel habits, or bloody stools; Genito-Urinary: dark urine noted with strong odor; no trouble voiding, or hematuria; Musculoskeletal: LLE with medial and lateral fasciotomies   Neurological: no TIA or stroke symptoms; Psychiatric: no nervousness, anxiety or depression.    PHYSICAL EXAM:   Right Arm BP - Sittin/64 (17 1050)  Left Arm BP - Sittin/58 (17 1050)  Pulse: 92  Temp: 96.3 °F (35.7 °C)      General appearance:  Alert, well-appearing, and in no distress.  Oriented to person, place, and time   Neurological: Normal speech, no focal findings noted; CN II - XII grossly intact           Musculoskeletal: Digits/nail without cyanosis/clubbing.  Normal muscle strength/tone.                 Neck: Supple, no significant adenopathy; thyroid is not enlarged                  Chest:  Clear to auscultation, no wheezes, rales or rhonchi, symmetric air entry     No use of accessory muscles             Cardiac: Normal rate and regular rhythm, S1 and S2 normal; PMI non-displaced          Abdomen: Soft, nontender, nondistended, no masses or organomegaly     No rebound tenderness noted; bowel sounds normal     No groin adenopathy      Extremities:  Left Leg with lateral fasciotomy wound with staples in place. 100% granulation tissue noted. No necrotic tissue.          Left anterior fasciotomy healing well with staples to medial site and 6 staples to the lateral site.      +2 PT pulses bilaterally     No pre-tibial edema     No ulcerations    LAB RESULTS:  Lab Results   Component Value Date    K 4.3 2017    K 4.3 2017    K 4.6 2017    CREATININE 1.2 2017    CREATININE 1.1 2017    CREATININE 1.1 2017     Lab  Results   Component Value Date    WBC 6.54 02/27/2017    WBC 6.54 02/24/2017    WBC 5.32 02/22/2017    HCT 32.0 (L) 02/27/2017    HCT 30.0 (L) 02/24/2017    HCT 29.7 (L) 02/22/2017     (H) 02/27/2017     02/24/2017     02/22/2017     No results found for: HGBA1C  IMAGING:  Diagnosis:  1. Encounter for surgical follow-up care         IMP/PLAN:  82 y.o. female with dementia, HTN, paroxysmal atrial fibrillation with left common femoral, popliteal embolus now s/p LLE thrombectomy and faciotomy who is here today with well healing anterior and lateral fasciotomy wounds.      Staples removed from medial and lateral wounds. Wound cleaned and dressing applied.      1. Encourage increased fluid intake  2. Bactrim  3. Wound vac orders to home health  4. F/u in 2 weeks for wound check    HARINDRE Chang, APRN, FNP-BC  Nurse Practitioner  Vascular and Endovascular Surgery    Vascular Attending  Agree with above  Cont eliquis and ASA  Re-start wound vac at home    Rigo Bernard MD FACS

## 2017-03-09 NOTE — PROGRESS NOTES
[facility]     HOME HEALTH ORDERS  FACE TO FACE ENCOUNTER     Patient Name: Boubacar Chen  YOB: 1934     PCP: Primary Doctor No   PCP Address: None  PCP Phone Number: None  PCP Fax: None     Encounter Date: 03/01/2017     Admit to Home Health     Diagnoses:  Active Hospital Problems     Diagnosis   POA    *Lower limb ischemia [I99.8]   Yes    Critical lower limb ischemia [I99.8]   Yes    Lower urinary tract infectious disease [N39.0]   Yes       Resolved Hospital Problems     Diagnosis Date Resolved POA   No resolved problems to display.         No future appointments.           I have seen and examined this patient face to face today. My clinical findings that support the need for the home health skilled services and home bound status are the following:  Requiring assistive device to leave home due to unsteady gait caused by Surgery and dementia.  Medical restrictions requiring assistance of another human to leave home due to Unstable ambulation, Post surgery monitoring and Wound care needs.  Mental confusion making it unsafe for patient to leave home alone due to Dementia.     Allergies:Review of patient's allergies indicates:  No Known Allergies     Diet: cardiac diet     Activities: activity as tolerated and ambulate in house     Nursing:   SN to complete comprehensive assessment including routine vital signs. Instruct on disease process and s/s of complications to report to MD. Review/verify medication list sent home with the patient at time of discharge and instruct patient/caregiver as needed. Frequency may be adjusted depending on start of care date.     Notify MD if SBP > 160 or < 90; DBP > 90 or < 50; HR > 120 or < 50; Temp > 101;         CONSULTS:   Physical Therapy to evaluate and treat. Evaluate for home safety and equipment needs; Establish/upgrade home exercise program. Perform / instruct on therapeutic exercises, gait training, transfer training, and Range  of Motion.  Occupational Therapy to evaluate and treat. Evaluate home environment for safety and equipment needs. Perform/Instruct on transfers, ADL training, ROM, and therapeutic exercises.  Speech Therapy to evaluate and treat for Cognition.   to evaluate for community resources/long-range planning.  Aide to provide assistance with personal care, ADLs, and vital signs.           WOUND CARE ORDERS  yes: Wound Vac:  Location: Left lateral lower leg   Vac changes every Monday, Wednesday and Friday.  Cleanse wound with wound cleanser  Large black foam  Continuous suction 125mmHg  13cm x 3.5cm x 3mm     Dry gauze daily to left groin incision site.            Medications: Review discharge medications with patient and family and provide education.           Current Discharge Medication List            START taking these medications     Details   apixaban 2.5 mg Tab Take 1 tablet (2.5 mg total) by mouth 2 (two) times daily.  Qty: 60 tablet, Refills: 11       aspirin (ECOTRIN) 81 MG EC tablet Take 1 tablet (81 mg total) by mouth once daily.  Refills: 0       hydrocodone-acetaminophen 5-325mg (NORCO) 5-325 mg per tablet Take 1 tablet by mouth every 4 (four) hours as needed.  Qty: 41 tablet, Refills: 0       lisinopril (PRINIVIL,ZESTRIL) 2.5 MG tablet Take 1 tablet (2.5 mg total) by mouth once daily.  Qty: 90 tablet, Refills: 3       senna-docusate 8.6-50 mg (PERICOLACE) 8.6-50 mg per tablet Take 1 tablet by mouth 2 (two) times daily.                CONTINUE these medications which have NOT CHANGED     Details   aripiprazole (ABILIFY) 10 MG Tab Take 5 mg by mouth once daily.       atorvastatin (LIPITOR) 10 MG tablet Take 10 mg by mouth once daily.        butalbital-acetaminophen-caffeine -40 mg (FIORICET) -40 mg per tablet Take 1 tablet by mouth every 4 (four) hours as needed for Pain.       donepezil (ARICEPT) 10 MG tablet Take 10 mg by mouth once daily.        escitalopram (LEXAPRO) 10 MG tablet  Take 10 mg by mouth once daily.        ginkgo biloba 60 mg Cap Take 1 tablet by mouth once daily.       metoprolol succinate (TOPROL-XL) 100 MG 24 hr tablet Take 100 mg by mouth once daily.        mirabegron (MYRBETRIQ) 25 mg Tb24 ER tablet Take 1 tablet (25 mg total) by mouth once daily.  Qty: 30 tablet, Refills: 11     Associated Diagnoses: Urinary frequency; History of recurrent UTIs; Urinary urgency       mirtazapine (REMERON) 30 MG tablet Take 30 mg by mouth every evening.       NAMENDA 10 mg Tab 10 mg 2 (two) times daily.        pantoprazole (PROTONIX) 40 MG tablet Take 1 tablet (40 mg total) by mouth once daily.  Qty: 30 tablet, Refills: 6     Associated Diagnoses: History of Helicobacter pylori infection; Dyspepsia; Epigastric pain       triamterene-hydrochlorothiazide 37.5-25 mg (DYAZIDE) 37.5-25 mg per capsule Take 1 capsule by mouth every morning.                 I certify that this patient is confined to her home and needs intermittent skilled nursing care, physical therapy, speech therapy and occupational therapy.

## 2017-03-15 NOTE — PROGRESS NOTES
Cr bumped at OV compared to previous.  Did not see lisinopril as a regular med in the med packet they brought to OV.  CBC with mild normocytic anemia, iron stores midlly low but ferritin was OK.  Suspect anemia due to chronic kidney disease.  Results to pt/daughter.  Stay hydrated.  Take all meds.  Will put in recall for 1 month.

## 2017-03-20 DIAGNOSIS — T81.31XA SURGICAL WOUND BREAKDOWN, INITIAL ENCOUNTER: ICD-10-CM

## 2017-03-20 RX ORDER — SULFAMETHOXAZOLE AND TRIMETHOPRIM 800; 160 MG/1; MG/1
1 TABLET ORAL 2 TIMES DAILY
Qty: 14 TABLET | Refills: 0 | Status: SHIPPED | OUTPATIENT
Start: 2017-03-20 | End: 2017-03-22 | Stop reason: SDUPTHER

## 2017-03-22 ENCOUNTER — OFFICE VISIT (OUTPATIENT)
Dept: VASCULAR SURGERY | Facility: CLINIC | Age: 82
End: 2017-03-22
Payer: COMMERCIAL

## 2017-03-22 VITALS
WEIGHT: 171 LBS | HEIGHT: 64 IN | SYSTOLIC BLOOD PRESSURE: 107 MMHG | BODY MASS INDEX: 29.19 KG/M2 | TEMPERATURE: 98 F | DIASTOLIC BLOOD PRESSURE: 57 MMHG | HEART RATE: 72 BPM

## 2017-03-22 DIAGNOSIS — I50.22 CHRONIC SYSTOLIC CONGESTIVE HEART FAILURE: ICD-10-CM

## 2017-03-22 PROCEDURE — 99999 PR PBB SHADOW E&M-EST. PATIENT-LVL III: CPT | Mod: PBBFAC,,, | Performed by: SURGERY

## 2017-03-22 PROCEDURE — 99024 POSTOP FOLLOW-UP VISIT: CPT | Mod: S$GLB,,, | Performed by: SURGERY

## 2017-03-22 RX ORDER — SULFAMETHOXAZOLE AND TRIMETHOPRIM 800; 160 MG/1; MG/1
1 TABLET ORAL 2 TIMES DAILY
Qty: 6 TABLET | Refills: 0 | Status: SHIPPED | OUTPATIENT
Start: 2017-03-22 | End: 2017-05-03 | Stop reason: ALTCHOICE

## 2017-03-22 RX ORDER — SPIRONOLACTONE 25 MG/1
25 TABLET ORAL DAILY
Qty: 90 TABLET | Refills: 0 | Status: SHIPPED | OUTPATIENT
Start: 2017-03-22 | End: 2017-11-07

## 2017-03-22 RX ORDER — HYDROCODONE BITARTRATE AND ACETAMINOPHEN 5; 325 MG/1; MG/1
1 TABLET ORAL EVERY 6 HOURS PRN
Qty: 31 TABLET | Refills: 0 | Status: SHIPPED | OUTPATIENT
Start: 2017-03-22 | End: 2017-05-03

## 2017-03-22 NOTE — PROGRESS NOTES
Boubacar Chen  03/22/2017    HPI:  Patient is a 82 y.o. female with dementia, HTN, paroxysmal atrial fibrillation with left common femoral, popliteal embolus now s/p LLE thrombectomy and faciotomy who is here today for evaluation of fasciotomy wound.  Pt's daughters are here and report Home Health is visiting for wound care and PT ([facility]: [phone]).  The patient's wound vac just arrived and the Home Health agency is requesting wound vac orders.      No MI/stroke  Tobacco use: No    Past Medical History:   Diagnosis Date    Anxiety     Arthritis     Dementia     Former smoker     Hyperlipidemia     Hypertension     Stroke     at age 58     Past Surgical History:   Procedure Laterality Date    ANTERIOR VAGINAL REPAIR  1960's    COLONOSCOPY  2008    COLONOSCOPY N/A 1/5/2016    Procedure: COLONOSCOPY;  Surgeon: Daniel Alicea MD;  Location: [facility];  Service: Endoscopy;  Laterality: N/A;    HYSTERECTOMY      total    JOINT REPLACEMENT      bilateral hip    UPPER GASTROINTESTINAL ENDOSCOPY  2008     h. pylori     Family History   Problem Relation Age of Onset    Colon cancer Mother      in late 60s    Colon cancer Father      Social History     Social History    Marital status:      Spouse name: N/A    Number of children: N/A    Years of education: N/A     Occupational History    Not on file.     Social History Main Topics    Smoking status: Former Smoker     Packs/day: 1.00     Years: 25.00     Types: Cigarettes     Quit date: 10/7/1998    Smokeless tobacco: Not on file    Alcohol use No      Comment: occ    Drug use: No    Sexual activity: Not on file     Other Topics Concern    Not on file     Social History Narrative       Current Outpatient Prescriptions:     apixaban 2.5 mg Tab, Take 1 tablet (2.5 mg total) by mouth 2 (two) times daily., Disp: 60 tablet, Rfl: 11    aripiprazole (ABILIFY) 10 MG Tab, Take 1 tablet (10 mg total) by mouth once daily., Disp: 90  tablet, Rfl: 3    aspirin (ECOTRIN) 81 MG EC tablet, Take 1 tablet (81 mg total) by mouth once daily., Disp: , Rfl: 0    atorvastatin (LIPITOR) 10 MG tablet, Take 1 tablet (10 mg total) by mouth once daily., Disp: 90 tablet, Rfl: 3    donepezil (ARICEPT) 10 MG tablet, Take 1 tablet (10 mg total) by mouth once daily., Disp: 30 tablet, Rfl: 11    escitalopram oxalate (LEXAPRO) 10 MG tablet, Take 1 tablet (10 mg total) by mouth once daily., Disp: 90 tablet, Rfl: 3    ferrous sulfate 325 mg (65 mg iron) Tab tablet, Take 1 tablet (325 mg total) by mouth 2 (two) times daily., Disp: , Rfl: 3    hydrocodone-acetaminophen 5-325mg (NORCO) 5-325 mg per tablet, Take 1 tablet by mouth every 6 (six) hours as needed for Pain., Disp: 31 tablet, Rfl: 0    memantine (NAMENDA) 10 MG Tab, Take 1 tablet (10 mg total) by mouth 2 (two) times daily., Disp: 180 tablet, Rfl: 3    mirtazapine (REMERON) 30 MG tablet, Take 1 tablet (30 mg total) by mouth every evening., Disp: 90 tablet, Rfl: 3    senna-docusate 8.6-50 mg (PERICOLACE) 8.6-50 mg per tablet, Take 1 tablet by mouth 2 (two) times daily., Disp: , Rfl:     spironolactone (ALDACTONE) 25 MG tablet, Take 1 tablet (25 mg total) by mouth once daily., Disp: 90 tablet, Rfl: 0    sulfamethoxazole-trimethoprim 800-160mg (BACTRIM DS) 800-160 mg Tab, Take 1 tablet by mouth 2 (two) times daily., Disp: 6 tablet, Rfl: 0    triamterene-hydrochlorothiazide 37.5-25 mg (DYAZIDE) 37.5-25 mg per capsule, Take 1 capsule by mouth every morning., Disp: 90 capsule, Rfl: 3    metoprolol succinate (TOPROL-XL) 50 MG 24 hr tablet, Take 1 tablet (50 mg total) by mouth once daily., Disp: 30 tablet, Rfl: 11    REVIEW OF SYSTEMS:  General: negative; ENT: negative; Allergy and Immunology: negative; Hematological and Lymphatic: Negative; Endocrine: negative; Respiratory: no cough, shortness of breath, or wheezing; Cardiovascular: no chest pain or dyspnea on exertion; Gastrointestinal: no abdominal  pain/back, change in bowel habits, or bloody stools; Genito-Urinary: dark urine noted with strong odor; no trouble voiding, or hematuria; Musculoskeletal: LLE with medial and lateral fasciotomies   Neurological: no TIA or stroke symptoms; Psychiatric: no nervousness, anxiety or depression.    PHYSICAL EXAM:   Right Arm BP - Sittin/57 (17 0930)  Left Arm BP - Sittin/57 (17 0930)  Pulse: 72  Temp: 97.7 °F (36.5 °C)      General appearance:  Alert, well-appearing, and in no distress.  Oriented to person, place, and time   Neurological: Normal speech, no focal findings noted; CN II - XII grossly intact           Musculoskeletal: Digits/nail without cyanosis/clubbing.  Normal muscle strength/tone.                 Neck: Supple, no significant adenopathy; thyroid is not enlarged                  Chest:  Clear to auscultation, no wheezes, rales or rhonchi, symmetric air entry     No use of accessory muscles             Cardiac: Normal rate and regular rhythm, S1 and S2 normal; PMI non-displaced          Abdomen: Soft, nontender, nondistended, no masses or organomegaly     No rebound tenderness noted; bowel sounds normal     No groin adenopathy      Extremities:  Left Leg with lateral fasciotomy wound with staples in place. 100% granulation tissue noted. No necrotic tissue.          Left anterior fasciotomy healing well with staples to medial site and 6 staples to the lateral site.      +2 PT pulses bilaterally     No pre-tibial edema     No ulcerations    LAB RESULTS:  Lab Results   Component Value Date    K 4.1 2017    K 4.3 2017    K 4.3 2017    CREATININE 1.5 (H) 2017    CREATININE 1.2 2017    CREATININE 1.1 2017     Lab Results   Component Value Date    WBC 6.85 2017    WBC 6.54 2017    WBC 6.54 2017    HCT 35.8 (L) 2017    HCT 32.0 (L) 2017    HCT 30.0 (L) 2017     (H) 2017     (H) 2017      02/24/2017     No results found for: HGBA1C  IMAGING:  Diagnosis:  1. Surgical wound infection, initial encounter  sulfamethoxazole-trimethoprim 800-160mg (BACTRIM DS) 800-160 mg Tab   2. Chronic systolic congestive heart failure  spironolactone (ALDACTONE) 25 MG tablet       IMP/PLAN:  82 y.o. female with dementia, HTN, paroxysmal atrial fibrillation with left common femoral, popliteal embolus now s/p LLE thrombectomy and faciotomy who is here today with well healing anterior and lateral fasciotomy wounds.      Staples removed from medial and lateral wounds. Wound cleaned and dressing applied.      1. Encourage increased fluid intake  2. Bactrim  3. Wound vac orders to home health    HARINDER Chang, APRN, FNP-BC  Nurse Practitioner  Vascular and Endovascular Surgery    Vascular Attending  Agree with above  2+ R PT pulse  No infection; good granulation tissue  Cont eliquis and ASA  Decrease wound vac at home to 75 pressure  Family does not want STSG; cautery done at bedside.  Expect wound vac can come off in ~2 weeks    Rigo Bernard MD FACS

## 2017-03-31 ENCOUNTER — DOCUMENTATION ONLY (OUTPATIENT)
Dept: VASCULAR SURGERY | Facility: CLINIC | Age: 82
End: 2017-03-31

## 2017-03-31 NOTE — TREATMENT PLAN
Jessica RN at [facility] ([phone]) called to report on patient's status: She reported that the patient's leg wound is healing well and the wound vac is no longer needed and is causing bruising to the periwound.  The RN reports that the wound has decreased in size to 0.2 cm and looks good and does not appear infected.    Instructed Jessica to stop wound vac.  Apply medihoney to the wound, cover with ABD pad and wrap with kerlix.     HARINDER Chang, APRN, FNP-BC  Nurse Practitioner  Vascular and Endovascular Surgery

## 2017-04-07 DIAGNOSIS — F02.80 ALZHEIMER'S DEMENTIA WITHOUT BEHAVIORAL DISTURBANCE, UNSPECIFIED TIMING OF DEMENTIA ONSET: Primary | Chronic | ICD-10-CM

## 2017-04-07 DIAGNOSIS — G30.9 ALZHEIMER'S DEMENTIA WITHOUT BEHAVIORAL DISTURBANCE, UNSPECIFIED TIMING OF DEMENTIA ONSET: Primary | Chronic | ICD-10-CM

## 2017-05-03 ENCOUNTER — OFFICE VISIT (OUTPATIENT)
Dept: WOUND CARE | Facility: CLINIC | Age: 82
End: 2017-05-03
Payer: COMMERCIAL

## 2017-05-03 VITALS
WEIGHT: 165.19 LBS | BODY MASS INDEX: 29.27 KG/M2 | HEIGHT: 63 IN | SYSTOLIC BLOOD PRESSURE: 113 MMHG | TEMPERATURE: 99 F | HEART RATE: 97 BPM | DIASTOLIC BLOOD PRESSURE: 67 MMHG

## 2017-05-03 DIAGNOSIS — L92.9 ABNORMAL GRANULATION TISSUE: ICD-10-CM

## 2017-05-03 DIAGNOSIS — T81.31XA DISRUPTION OF EXTERNAL OPERATION (SURGICAL) WOUND: ICD-10-CM

## 2017-05-03 PROBLEM — T81.49XA SURGICAL WOUND INFECTION: Status: ACTIVE | Noted: 2017-05-03

## 2017-05-03 PROBLEM — I70.229 CRITICAL LOWER LIMB ISCHEMIA: Chronic | Status: RESOLVED | Noted: 2017-02-14 | Resolved: 2017-05-03

## 2017-05-03 PROCEDURE — 99214 OFFICE O/P EST MOD 30 MIN: CPT | Mod: PBBFAC,25 | Performed by: NURSE PRACTITIONER

## 2017-05-03 PROCEDURE — 17250 CHEM CAUT OF GRANLTJ TISSUE: CPT | Mod: S$GLB,,, | Performed by: NURSE PRACTITIONER

## 2017-05-03 PROCEDURE — 99999 PR PBB SHADOW E&M-EST. PATIENT-LVL IV: CPT | Mod: PBBFAC,,, | Performed by: NURSE PRACTITIONER

## 2017-05-03 PROCEDURE — 17250 CHEM CAUT OF GRANLTJ TISSUE: CPT | Mod: PBBFAC | Performed by: NURSE PRACTITIONER

## 2017-05-03 NOTE — MR AVS SNAPSHOT
[facility] - [facility]ound [facility]  [address]  [facility] [address]  Phone: [phone]                  Boubacar Chen   5/3/2017 10:00 AM   Office Visit    Description:  Female : 1934   Provider:  Anusha Vines NP   Department:  [facility]           Reason for Visit     Wound Check           Diagnoses this Visit        Comments    Surgical wound infection, initial encounter         Disruption of external operation (surgical) wound         Abnormal granulation tissue                To Do List           Future Appointments        Provider Department Dept Phone    2017 2:45 PM KRISTIN Winkler MD [facility] - Urology [phone]    2017 2:00 PM [facility]ANDRIA [facility] [phone]      Goals (5 Years of Data)     None      Follow-Up and Disposition     Return in about 3 weeks (around 2017) for Wound evaluation.      [facility]     [facility] -  Assistance  Unless otherwise directed by your provider, please contact [facility], our nurse care line that is available for  assistance.     Registered nurses in the [facility] provide: appointment scheduling, clinical advisement, health education, and other advisory services.  Call: [phone] (toll free)               Medications           Message regarding Medications     Verify the changes and/or additions to your medication regime listed below are the same as discussed with your clinician today.  If any of these changes or additions are incorrect, please notify your healthcare provider.        STOP taking these medications     sulfamethoxazole-trimethoprim 800-160mg (BACTRIM DS) 800-160 mg Tab Take 1 tablet by mouth 2 (two) times daily.    butalbital-acetaminop-caf-cod -26-30 mg Cap Take by mouth.    ferrous sulfate 325 mg (65 mg iron) Tab tablet Take 1 tablet (325 mg total) by mouth 2 (two) times daily.    hydrocodone-acetaminophen 5-325mg (NORCO)  "5-325 mg per tablet Take 1 tablet by mouth every 6 (six) hours as needed for Pain.           Verify that the below list of medications is an accurate representation of the medications you are currently taking.  If none reported, the list may be blank. If incorrect, please contact your healthcare provider. Carry this list with you in case of emergency.           Current Medications     apixaban 2.5 mg Tab Take 1 tablet (2.5 mg total) by mouth 2 (two) times daily.    aripiprazole (ABILIFY) 10 MG Tab Take 1 tablet (10 mg total) by mouth once daily.    aspirin (ECOTRIN) 81 MG EC tablet Take 1 tablet (81 mg total) by mouth once daily.    atorvastatin (LIPITOR) 10 MG tablet Take 1 tablet (10 mg total) by mouth once daily.    donepezil (ARICEPT) 10 MG tablet Take 1 tablet (10 mg total) by mouth once daily.    escitalopram oxalate (LEXAPRO) 10 MG tablet Take 1 tablet (10 mg total) by mouth once daily.    memantine (NAMENDA) 10 MG Tab Take 1 tablet (10 mg total) by mouth 2 (two) times daily.    metoprolol succinate (TOPROL-XL) 50 MG 24 hr tablet Take 1 tablet (50 mg total) by mouth once daily.    mirtazapine (REMERON) 30 MG tablet Take 1 tablet (30 mg total) by mouth every evening.    senna-docusate 8.6-50 mg (PERICOLACE) 8.6-50 mg per tablet Take 1 tablet by mouth 2 (two) times daily.    spironolactone (ALDACTONE) 25 MG tablet Take 1 tablet (25 mg total) by mouth once daily.    triamterene-hydrochlorothiazide 37.5-25 mg (DYAZIDE) 37.5-25 mg per capsule Take 1 capsule by mouth every morning.           Clinical Reference Information           Your Vitals Were     BP Pulse Temp Height Weight BMI    113/67 (BP Location: Right arm, Patient Position: Sitting, BP Method: Automatic) 97 98.7 °F (37.1 °C) 5' 3" (1.6 m) 74.9 kg (165 lb 3.2 oz) 29.26 kg/m2      Blood Pressure          Most Recent Value    BP  113/67      Allergies as of 5/3/2017     No Known Allergies      Immunizations Administered on Date of Encounter - 5/3/2017     " None      Instructions    Wound Care  Taking proper care of your wound will help it heal. Your healthcare provider may show you how to clean and dress the wound. He or she will also explain how to tell if the wound is healing normally. Here are the basic steps:      A wound that's not healing normally may be dark in color or have white streaks.    Wash Your Hands  · Use liquid soap and lather for 2 minutes. Scrub between your fingers and under your nails.  · Rinse with warm water, keeping your fingers pointing down.  · Use a paper towel to dry your hands and to turn off the faucet.  Remove the Used Dressing  · Set up your supplies.  · Put on disposable gloves if youre dressing a wound for someone else or your wound is infected.  · Gently take off the old dressing. If you have a drain or tube in the wound, be careful not to pull on it.  · Loosen the tape by pulling gently toward the wound.  · Remove the dressing one layer at a time and put it in a plastic bag.  · Remove your gloves.  Inspect and Dress the Wound  · Each time you change the dressing, inspect the wound carefully to be sure its healing normally.  · Wash your hands again. Put on a new pair of gloves if youre dressing a wound for someone else or if your wound is infected.  · Clean and dress the wound as directed by your doctor or nurse. If you have a drain or tube, be careful not to pull on it.  · Put all supplies in a plastic bag; seal the bag and put it in the trash.  · Be sure to wash your hands again.  Call your healthcare provider if you see any of the following signs of a problem:   · Bleeding that soaks the dressing  · Pink fluid weeping from the wound  · Increased drainage or drainage that is yellow, yellow-green, or foul-smelling  · Increased swelling or pain, or redness or swelling in the skin around the wound  · A change in the color of the wound  · An increase in the size of the wound  · A fever over 101.0°F, increased fatigue, or a loss of  appetite.       © 5526-6390 [facility] [facility], [address] [place] [address], [place] [address]. All rights reserved. This information is not intended as a substitute for professional medical care. Always follow your healthcare professional's instructions.    Nutrition and MyPlate: Protein Foods   This group includes foods that are high in protein. Protein helps the body build new cells and keeps tissues healthy. Most Americans get enough protein without even trying. It can be harder for vegetarians, but plenty of non-meat foods are rich in protein, too. Its best to get protein from a variety of sources.   Nutrient-Rich Choices   Theres a lot more to this food group than just meat and beans. It also includes nuts, seeds, and eggs. There are all sorts of nutrient-rich choices:   Chicken and turkey with the skin removed   Fish and shellfish   Lean beef, pork, or lamb (without visible fat)   Soy products, such as tofu, soybeans (edamame), tempeh, or soymilk   Black beans, kidney beans, lambert beans, chickpeas (garbanzo beans), and lentils (Note: beans and peas count as both a protein and a vegetable)   Peanuts, almonds, walnuts, sesame seeds, and sunflower  seeds, as well as foods made from these (such as peanut butter or tahini)  Eggs and foods made with eggs (such as quiche or frittata)  What Makes Meat and Beans Less Healthy?   Fatty meat is not healthy. Before you cook meat, trim off all the fat you can see. Chicken and turkey skin is also high in fat, and should be removed before cooking.   Breading and frying make food less healthy. This includes dishes like fried chicken, fried fish, and refried beans.   Sausage and lunch meats tend to be high in fat and salt. Buy low-fat, low-sodium versions.  One Small Change   Make a meal that includes a non-meat source of protein (such as tofu, lentils, or any other food listed above). Have a better idea? Write it here:   _____________________________________________________________    © 9321-7954 Shelley [facility], [address] [place] [address], [place] [address]. All rights reserved. This information is not intended as a substitute for professional medical care. Always follow your healthcare professional's instructions.      Keep your dressing dry.  Use a cast protector for showering.  On the day the dressing is changed you may remove it and shower with a mild soap such as Dove.  Irrigate the wound with lukewarm water for 5 minutes, then dry thoroughly.  Apply skin prep to the periwound area.  Apply a foam border dressing to the wound.        Language Assistance Services     ATTENTION: Language assistance services are available, free of charge. Please call [phone].      ATENCIÓN: Si collin pablo, tiene a pelletier disposición servicios gratuitos de asistencia lingüística. Llame al [phone].     CHÚ Ý: N?u b?n nói Ti?ng Vi?t, có các d?ch v? h? tr? ngôn ng? mi?n phí dành cho b?n. G?i s? [phone].         [facility] complies with applicable Federal civil rights laws and does not discriminate on the basis of race, color, national origin, age, disability, or sex.

## 2017-05-03 NOTE — PATIENT INSTRUCTIONS
Wound Care  Taking proper care of your wound will help it heal. Your healthcare provider may show you how to clean and dress the wound. He or she will also explain how to tell if the wound is healing normally. Here are the basic steps:      A wound that's not healing normally may be dark in color or have white streaks.    Wash Your Hands  · Use liquid soap and lather for 2 minutes. Scrub between your fingers and under your nails.  · Rinse with warm water, keeping your fingers pointing down.  · Use a paper towel to dry your hands and to turn off the faucet.  Remove the Used Dressing  · Set up your supplies.  · Put on disposable gloves if youre dressing a wound for someone else or your wound is infected.  · Gently take off the old dressing. If you have a drain or tube in the wound, be careful not to pull on it.  · Loosen the tape by pulling gently toward the wound.  · Remove the dressing one layer at a time and put it in a plastic bag.  · Remove your gloves.  Inspect and Dress the Wound  · Each time you change the dressing, inspect the wound carefully to be sure its healing normally.  · Wash your hands again. Put on a new pair of gloves if youre dressing a wound for someone else or if your wound is infected.  · Clean and dress the wound as directed by your doctor or nurse. If you have a drain or tube, be careful not to pull on it.  · Put all supplies in a plastic bag; seal the bag and put it in the trash.  · Be sure to wash your hands again.  Call your healthcare provider if you see any of the following signs of a problem:   · Bleeding that soaks the dressing  · Pink fluid weeping from the wound  · Increased drainage or drainage that is yellow, yellow-green, or foul-smelling  · Increased swelling or pain, or redness or swelling in the skin around the wound  · A change in the color of the wound  · An increase in the size of the wound  · A fever over 101.0°F, increased fatigue, or a loss of appetite.       ©  4303-3134 Shelley [facility], [address] [place] [address], [place] [address]. All rights reserved. This information is not intended as a substitute for professional medical care. Always follow your healthcare professional's instructions.    Nutrition and MyPlate: Protein Foods   This group includes foods that are high in protein. Protein helps the body build new cells and keeps tissues healthy. Most Americans get enough protein without even trying. It can be harder for vegetarians, but plenty of non-meat foods are rich in protein, too. Its best to get protein from a variety of sources.   Nutrient-Rich Choices   Theres a lot more to this food group than just meat and beans. It also includes nuts, seeds, and eggs. There are all sorts of nutrient-rich choices:   Chicken and turkey with the skin removed   Fish and shellfish   Lean beef, pork, or lamb (without visible fat)   Soy products, such as tofu, soybeans (edamame), tempeh, or soymilk   Black beans, kidney beans, lambert beans, chickpeas (garbanzo beans), and lentils (Note: beans and peas count as both a protein and a vegetable)   Peanuts, almonds, walnuts, sesame seeds, and sunflower  seeds, as well as foods made from these (such as peanut butter or tahini)  Eggs and foods made with eggs (such as quiche or frittata)  What Makes Meat and Beans Less Healthy?   Fatty meat is not healthy. Before you cook meat, trim off all the fat you can see. Chicken and turkey skin is also high in fat, and should be removed before cooking.   Breading and frying make food less healthy. This includes dishes like fried chicken, fried fish, and refried beans.   Sausage and lunch meats tend to be high in fat and salt. Buy low-fat, low-sodium versions.  One Small Change   Make a meal that includes a non-meat source of protein (such as tofu, lentils, or any other food listed above). Have a better idea? Write it here:   _____________________________________________________________   © 2000-2011  [employer]ley [facility], [address] [place] [address], [place] [address]. All rights reserved. This information is not intended as a substitute for professional medical care. Always follow your healthcare professional's instructions.      Keep your dressing dry.  Use a cast protector for showering.  On the day the dressing is changed you may remove it and shower with a mild soap such as Dove.  Irrigate the wound with lukewarm water for 5 minutes, then dry thoroughly.  Apply skin prep to the periwound area.  Apply a foam border dressing to the wound.

## 2017-05-09 ENCOUNTER — OFFICE VISIT (OUTPATIENT)
Dept: UROLOGY | Facility: CLINIC | Age: 82
End: 2017-05-09
Payer: COMMERCIAL

## 2017-05-09 VITALS
HEART RATE: 68 BPM | HEIGHT: 65 IN | WEIGHT: 167.31 LBS | SYSTOLIC BLOOD PRESSURE: 120 MMHG | BODY MASS INDEX: 27.88 KG/M2 | DIASTOLIC BLOOD PRESSURE: 72 MMHG

## 2017-05-09 DIAGNOSIS — R35.1 NOCTURIA MORE THAN TWICE PER NIGHT: ICD-10-CM

## 2017-05-09 DIAGNOSIS — R35.0 URINARY FREQUENCY: Primary | ICD-10-CM

## 2017-05-09 DIAGNOSIS — R39.15 URINARY URGENCY: ICD-10-CM

## 2017-05-09 PROCEDURE — 99214 OFFICE O/P EST MOD 30 MIN: CPT | Mod: S$GLB,,, | Performed by: UROLOGY

## 2017-05-09 PROCEDURE — 99999 PR PBB SHADOW E&M-EST. PATIENT-LVL III: CPT | Mod: PBBFAC,,, | Performed by: UROLOGY

## 2017-05-09 PROCEDURE — 99213 OFFICE O/P EST LOW 20 MIN: CPT | Mod: PBBFAC | Performed by: UROLOGY

## 2017-05-09 RX ORDER — LISINOPRIL 10 MG/1
10 TABLET ORAL DAILY
COMMUNITY
End: 2017-06-27

## 2017-05-09 NOTE — PROGRESS NOTES
Subjective:       Patient ID: Boubacar Chen is a 82 y.o. female who was referred by Self, Aaareferral    Chief Complaint:   Chief Complaint   Patient presents with    Urinary Tract Infection     new pt coming in for elevated creatinine       Patient is here with daughters with c/o urinary frequency and malodorous urine x 2 months. They are also concerned about her creatnine being elevated (1.5), previous creatnine have been within normal limits. Patient has Dementia and has been living with daughters since February. Daughters state that it is difficult to get patient to drink water during the day, but patient will drink tea and juice. They report urinary frequency, hesitancy, and leakage.  Leakage occurs occasionally when patient stands up. She wears 3-5 diapers/day and nocturia x4-5. Daughters also report occasional bedwetting. Denies hx of kidney stones, hematuria, dysuria, or constipation.      ACTIVE MEDICAL ISSUES:  Patient Active Problem List   Diagnosis    Lower urinary tract infectious disease    Diarrhea    Epigastric abdominal pain    Paroxysmal atrial fibrillation    Acute deep vein thrombosis (DVT) of popliteal vein of left lower extremity    CKD (chronic kidney disease) stage 3, GFR 30-59 ml/min    CHF (congestive heart failure)    Odontoid fracture with routine healing    Dementia    Surgical wound infection    Disruption of external operation (surgical) wound    Abnormal granulation tissue       PAST MEDICAL HISTORY  Past Medical History:   Diagnosis Date    Anxiety     Arthritis     Dementia     Former smoker     Hyperlipidemia     Hypertension     Stroke     at age 58       PAST SURGICAL HISTORY:  Past Surgical History:   Procedure Laterality Date    ANTERIOR VAGINAL REPAIR  1960's    COLONOSCOPY  2008    COLONOSCOPY N/A 1/5/2016    Procedure: COLONOSCOPY;  Surgeon: Daniel Alicea MD;  Location: [facility];  Service: Endoscopy;  Laterality: N/A;    HYSTERECTOMY       total    JOINT REPLACEMENT      bilateral hip    UPPER GASTROINTESTINAL ENDOSCOPY  2008     h. pylori       SOCIAL HISTORY:  Social History   Substance Use Topics    Smoking status: Former Smoker     Packs/day: 1.00     Years: 25.00     Types: Cigarettes     Quit date: 10/7/1998    Smokeless tobacco: Never Used    Alcohol use No      Comment: occ       FAMILY HISTORY:  Family History   Problem Relation Age of Onset    Colon cancer Mother      in late 60s    Colon cancer Father        ALLERGIES AND MEDICATIONS: updated and reviewed.  Review of patient's allergies indicates:  No Known Allergies  Current Outpatient Prescriptions   Medication Sig    apixaban 2.5 mg Tab Take 1 tablet (2.5 mg total) by mouth 2 (two) times daily. (Patient taking differently: Take 10 mg by mouth 2 (two) times daily. )    aripiprazole (ABILIFY) 10 MG Tab Take 1 tablet (10 mg total) by mouth once daily.    aspirin (ECOTRIN) 81 MG EC tablet Take 1 tablet (81 mg total) by mouth once daily.    atorvastatin (LIPITOR) 10 MG tablet Take 1 tablet (10 mg total) by mouth once daily.    donepezil (ARICEPT) 10 MG tablet Take 1 tablet (10 mg total) by mouth once daily.    escitalopram oxalate (LEXAPRO) 10 MG tablet Take 1 tablet (10 mg total) by mouth once daily.    lisinopril 10 MG tablet Take 10 mg by mouth once daily.    memantine (NAMENDA) 10 MG Tab Take 1 tablet (10 mg total) by mouth 2 (two) times daily.    metoprolol succinate (TOPROL-XL) 50 MG 24 hr tablet Take 1 tablet (50 mg total) by mouth once daily.    mirtazapine (REMERON) 30 MG tablet Take 1 tablet (30 mg total) by mouth every evening.    spironolactone (ALDACTONE) 25 MG tablet Take 1 tablet (25 mg total) by mouth once daily.    triamterene-hydrochlorothiazide 37.5-25 mg (DYAZIDE) 37.5-25 mg per capsule Take 1 capsule by mouth every morning.    mirabegron (MYRBETRIQ) 50 mg Tb24 Take 1 tablet (50 mg total) by mouth once daily.     No current facility-administered  "medications for this visit.        Review of Systems   Constitutional: Negative for activity change, chills, fatigue, fever and unexpected weight change.   Eyes: Negative for discharge, redness and visual disturbance.   Respiratory: Negative for cough, shortness of breath and wheezing.    Cardiovascular: Negative for chest pain and leg swelling.   Gastrointestinal: Negative for abdominal distention, abdominal pain, constipation, diarrhea, nausea and vomiting.   Genitourinary: Positive for difficulty urinating and frequency. Negative for dysuria, hematuria and urgency.   Musculoskeletal: Negative for arthralgias, joint swelling and myalgias.   Skin: Negative for color change and rash.   Neurological: Negative for dizziness and light-headedness.   Psychiatric/Behavioral: Negative for behavioral problems and confusion. The patient is not nervous/anxious.        Objective:      Vitals:    05/09/17 1441   BP: 120/72   Pulse: 68   Weight: 75.9 kg (167 lb 5.3 oz)   Height: 5' 5" (1.651 m)     Physical Exam   Constitutional: She appears well-developed.   HENT:   Head: Normocephalic and atraumatic.   Nose: Nose normal.   Eyes: Conjunctivae are normal. Right eye exhibits no discharge. Left eye exhibits no discharge.   Neck: Normal range of motion. Neck supple. No tracheal deviation present. No thyromegaly present.   Cardiovascular: Normal rate and regular rhythm.    Pulmonary/Chest: Effort normal. No respiratory distress. She has no wheezes.   Abdominal: Soft. She exhibits no distension. There is no hepatosplenomegaly. There is no tenderness. There is no CVA tenderness. No hernia.   Musculoskeletal: Normal range of motion. She exhibits no edema.   Neurological: She is alert.   Dementia     Skin: Skin is warm and dry. No rash noted. No erythema.     Psychiatric: She has a normal mood and affect. Her behavior is normal. Judgment normal.       Urine dipstick shows patient unable to produce specimen.  Micro exam: not " done.    Assessment:       1. Urinary frequency    2. Urinary urgency    3. Nocturia more than twice per night          Plan:   1. Urinary frequency    - Urine culture  - mirabegron (MYRBETRIQ) 50 mg Tb24; Take 1 tablet (50 mg total) by mouth once daily.  Dispense: 30 tablet; Refill: 11  - US Retroperitoneal Complete (Kidney and; Future  - Basic metabolic panel; Future    2. Urinary urgency      3. Nocturia more than twice per night                  Return in about 6 weeks (around 6/20/2017) for Review X-ray, Follow up.

## 2017-05-09 NOTE — MR AVS SNAPSHOT
[facility]  [address]  [place] [address]  Phone: [phone]                  Boubacar Chen   2017 2:45 PM   Office Visit    Description:  Female : 1934   Provider:  KRISTIN Winkler MD   Department:  [facility]           Reason for Visit     Urinary Tract Infection           Diagnoses this Visit        Comments    Urinary frequency    -  Primary     Urinary urgency         Nocturia more than twice per night                To Do List           Future Appointments        Provider Department Dept Phone    2017 2:00 PM Anusha Vines NP [facility] [phone]      Goals (5 Years of Data)     None      Follow-Up and Disposition     Return in about 6 weeks (around 2017) for Review X-ray, Follow up.       These Medications        Disp Refills Start End    mirabegron (MYRBETRIQ) 50 mg Tb24 30 tablet 11 2017    Take 1 tablet (50 mg total) by mouth once daily. - Oral    Pharmacy: [facility] - ANDREY LEDESMA [address] #: [phone]         [facility]     [facility] Nurse Care Line -  Assistance  Unless otherwise directed by your provider, please contact [facility], our nurse care line that is available for  assistance.     Registered nurses in the [facility] provide: appointment scheduling, clinical advisement, health education, and other advisory services.  Call: [phone] (toll free)               Medications           Message regarding Medications     Verify the changes and/or additions to your medication regime listed below are the same as discussed with your clinician today.  If any of these changes or additions are incorrect, please notify your healthcare provider.        START taking these NEW medications        Refills    mirabegron (MYRBETRIQ) 50 mg Tb24 11    Sig: Take 1 tablet (50 mg total) by mouth once daily.    Class: Normal    Route: Oral  "     STOP taking these medications     senna-docusate 8.6-50 mg (PERICOLACE) 8.6-50 mg per tablet Take 1 tablet by mouth 2 (two) times daily.           Verify that the below list of medications is an accurate representation of the medications you are currently taking.  If none reported, the list may be blank. If incorrect, please contact your healthcare provider. Carry this list with you in case of emergency.           Current Medications     apixaban 2.5 mg Tab Take 1 tablet (2.5 mg total) by mouth 2 (two) times daily.    aripiprazole (ABILIFY) 10 MG Tab Take 1 tablet (10 mg total) by mouth once daily.    aspirin (ECOTRIN) 81 MG EC tablet Take 1 tablet (81 mg total) by mouth once daily.    atorvastatin (LIPITOR) 10 MG tablet Take 1 tablet (10 mg total) by mouth once daily.    donepezil (ARICEPT) 10 MG tablet Take 1 tablet (10 mg total) by mouth once daily.    escitalopram oxalate (LEXAPRO) 10 MG tablet Take 1 tablet (10 mg total) by mouth once daily.    lisinopril 10 MG tablet Take 10 mg by mouth once daily.    memantine (NAMENDA) 10 MG Tab Take 1 tablet (10 mg total) by mouth 2 (two) times daily.    metoprolol succinate (TOPROL-XL) 50 MG 24 hr tablet Take 1 tablet (50 mg total) by mouth once daily.    mirtazapine (REMERON) 30 MG tablet Take 1 tablet (30 mg total) by mouth every evening.    spironolactone (ALDACTONE) 25 MG tablet Take 1 tablet (25 mg total) by mouth once daily.    triamterene-hydrochlorothiazide 37.5-25 mg (DYAZIDE) 37.5-25 mg per capsule Take 1 capsule by mouth every morning.    mirabegron (MYRBETRIQ) 50 mg Tb24 Take 1 tablet (50 mg total) by mouth once daily.           Clinical Reference Information           Your Vitals Were     BP Pulse Height Weight BMI    120/72 68 5' 5" (1.651 m) 75.9 kg (167 lb 5.3 oz) 27.85 kg/m2      Blood Pressure          Most Recent Value    BP  120/72      Allergies as of 5/9/2017     No Known Allergies      Immunizations Administered on Date of Encounter - 5/9/2017  "    None      Orders Placed During Today's Visit      Normal Orders This Visit    Urine culture     Future Labs/Procedures Expected by Expires    Basic metabolic panel  5/9/2017 5/9/2018    US Retroperitoneal Complete (Kidney and  5/9/2017 5/9/2018      Language Assistance Services     ATTENTION: Language assistance services are available, free of charge. Please call [phone].      ATENCIÓN: Si habla español, tiene a pelletier disposición servicios gratuitos de asistencia lingüística. Llame al [phone].     CHÚ Ý: N?u b?n nói Ti?ng Vi?t, có các d?ch v? h? tr? ngôn ng? mi?n phí dành cho b?n. G?i s? [phone].         [facility] complies with applicable Federal civil rights laws and does not discriminate on the basis of race, color, national origin, age, disability, or sex.

## 2017-05-10 ENCOUNTER — LAB VISIT (OUTPATIENT)
Dept: LAB | Facility: HOSPITAL | Age: 82
End: 2017-05-10
Attending: UROLOGY
Payer: COMMERCIAL

## 2017-05-10 DIAGNOSIS — N39.0 URINARY TRACT INFECTION, SITE NOT SPECIFIED: Primary | ICD-10-CM

## 2017-05-10 DIAGNOSIS — N39.0 URINARY TRACT INFECTION, SITE NOT SPECIFIED: ICD-10-CM

## 2017-05-10 LAB
BACTERIA #/AREA URNS HPF: ABNORMAL /HPF
BILIRUB UR QL STRIP: NEGATIVE
CLARITY UR: ABNORMAL
COLOR UR: YELLOW
GLUCOSE UR QL STRIP: NEGATIVE
HGB UR QL STRIP: NEGATIVE
KETONES UR QL STRIP: NEGATIVE
LEUKOCYTE ESTERASE UR QL STRIP: ABNORMAL
MICROSCOPIC COMMENT: ABNORMAL
NITRITE UR QL STRIP: POSITIVE
PH UR STRIP: 5 [PH] (ref 5–8)
PROT UR QL STRIP: NEGATIVE
RBC #/AREA URNS HPF: 2 /HPF (ref 0–4)
SP GR UR STRIP: 1.01 (ref 1–1.03)
SQUAMOUS #/AREA URNS HPF: 4 /HPF
URN SPEC COLLECT METH UR: ABNORMAL
UROBILINOGEN UR STRIP-ACNC: NEGATIVE EU/DL
WBC #/AREA URNS HPF: 15 /HPF (ref 0–5)

## 2017-05-10 PROCEDURE — 87077 CULTURE AEROBIC IDENTIFY: CPT

## 2017-05-10 PROCEDURE — 81000 URINALYSIS NONAUTO W/SCOPE: CPT

## 2017-05-10 PROCEDURE — 87186 SC STD MICRODIL/AGAR DIL: CPT

## 2017-05-10 PROCEDURE — 87088 URINE BACTERIA CULTURE: CPT

## 2017-05-10 PROCEDURE — 87086 URINE CULTURE/COLONY COUNT: CPT

## 2017-05-11 ENCOUNTER — TELEPHONE (OUTPATIENT)
Dept: VASCULAR SURGERY | Facility: CLINIC | Age: 82
End: 2017-05-11

## 2017-05-11 NOTE — TELEPHONE ENCOUNTER
Spoke to pt daughter brandon, her mother is in pain . She stated not in severe pain , but having mild pain. i told her if the pt is in severe pain then she should go to the ED, she then stated that its not that bad ,but will consider it. I made a appt for next week with Dr. Bernard

## 2017-05-11 NOTE — TELEPHONE ENCOUNTER
----- Message from Dee Miller sent at 5/11/2017 12:31 PM CDT -----  Contact: brandon/daughter  [phone]//caller states that she needs to speak with nurse in ref to pt being in a lot of pain in her leg and needs to know if she should bring her in or bring her to the hospital//please call//thank you

## 2017-05-12 ENCOUNTER — TELEPHONE (OUTPATIENT)
Dept: UROLOGY | Facility: CLINIC | Age: 82
End: 2017-05-12

## 2017-05-12 DIAGNOSIS — N30.20 CHRONIC CYSTITIS: ICD-10-CM

## 2017-05-12 DIAGNOSIS — N30.20 CHRONIC CYSTITIS: Primary | ICD-10-CM

## 2017-05-12 LAB — BACTERIA UR CULT: NORMAL

## 2017-05-12 RX ORDER — CEPHALEXIN 500 MG/1
500 CAPSULE ORAL EVERY 8 HOURS
Qty: 21 CAPSULE | Refills: 0 | Status: SHIPPED | OUTPATIENT
Start: 2017-05-12 | End: 2017-05-19

## 2017-05-12 RX ORDER — CEPHALEXIN 500 MG/1
500 CAPSULE ORAL EVERY 8 HOURS
Qty: 21 CAPSULE | Refills: 0 | Status: SHIPPED | OUTPATIENT
Start: 2017-05-12 | End: 2017-05-12 | Stop reason: SDUPTHER

## 2017-05-12 NOTE — TELEPHONE ENCOUNTER
Spoke to pt's daughter (brandon) advise rx sent to [facility]./shantell

## 2017-05-12 NOTE — TELEPHONE ENCOUNTER
----- Message from Robina Salguero sent at 5/12/2017  2:50 PM CDT -----  Contact: daughter -Tali   Patient's daughter calling . She states the prescription for Keflex was suppose to go to the [facility] on [address]    .         pts  #   [phone]      LL

## 2017-05-17 ENCOUNTER — HOSPITAL ENCOUNTER (OUTPATIENT)
Dept: VASCULAR SURGERY | Facility: CLINIC | Age: 82
Discharge: HOME | End: 2017-05-17
Admitting: SURGERY
Payer: COMMERCIAL

## 2017-05-17 ENCOUNTER — OFFICE VISIT (OUTPATIENT)
Dept: VASCULAR SURGERY | Facility: CLINIC | Age: 82
End: 2017-05-17
Payer: COMMERCIAL

## 2017-05-17 VITALS
SYSTOLIC BLOOD PRESSURE: 121 MMHG | DIASTOLIC BLOOD PRESSURE: 73 MMHG | HEIGHT: 65 IN | TEMPERATURE: 99 F | WEIGHT: 168 LBS | BODY MASS INDEX: 27.99 KG/M2 | HEART RATE: 83 BPM

## 2017-05-17 DIAGNOSIS — I70.229 CRITICAL LOWER LIMB ISCHEMIA: Primary | ICD-10-CM

## 2017-05-17 DIAGNOSIS — I48.0 PAROXYSMAL ATRIAL FIBRILLATION: Primary | Chronic | ICD-10-CM

## 2017-05-17 DIAGNOSIS — I70.229 CRITICAL LOWER LIMB ISCHEMIA: ICD-10-CM

## 2017-05-17 PROCEDURE — 99213 OFFICE O/P EST LOW 20 MIN: CPT | Mod: PBBFAC,25 | Performed by: SURGERY

## 2017-05-17 PROCEDURE — 99214 OFFICE O/P EST MOD 30 MIN: CPT | Mod: S$GLB,,, | Performed by: SURGERY

## 2017-05-17 PROCEDURE — 99999 PR PBB SHADOW E&M-EST. PATIENT-LVL III: CPT | Mod: PBBFAC,,, | Performed by: SURGERY

## 2017-05-17 PROCEDURE — 93924 LWR XTR VASC STDY BILAT: CPT | Mod: S$GLB,,, | Performed by: SURGERY

## 2017-05-17 NOTE — PROGRESS NOTES
Boubacar Chen  05/17/2017    HPI:  Patient is a 82 y.o. female with dementia, HTN, paroxysmal atrial fibrillation with left common femoral, popliteal embolus now s/p LLE thrombectomy and faciotomy who is here today for evaluation of fasciotomy wound.  Pt's daughters are here and report Home Health is visiting for wound care and PT ([facility]: [phone]).  The patient's wound vac just arrived and the Home Health agency is requesting wound vac orders.      No MI/stroke  Tobacco use: No    Past Medical History:   Diagnosis Date    Anxiety     Arthritis     Dementia     Former smoker     Hyperlipidemia     Hypertension     Stroke     at age 58     Past Surgical History:   Procedure Laterality Date    ANTERIOR VAGINAL REPAIR  1960's    COLONOSCOPY  2008    COLONOSCOPY N/A 1/5/2016    Procedure: COLONOSCOPY;  Surgeon: Daniel Alicea MD;  Location: [facility];  Service: Endoscopy;  Laterality: N/A;    HYSTERECTOMY      total    JOINT REPLACEMENT      bilateral hip    UPPER GASTROINTESTINAL ENDOSCOPY  2008     h. pylori     Family History   Problem Relation Age of Onset    Colon cancer Mother      in late 60s    Colon cancer Father      Social History     Social History    Marital status:      Spouse name: N/A    Number of children: N/A    Years of education: N/A     Occupational History    Not on file.     Social History Main Topics    Smoking status: Former Smoker     Packs/day: 1.00     Years: 25.00     Types: Cigarettes     Quit date: 10/7/1998    Smokeless tobacco: Never Used    Alcohol use No      Comment: occ    Drug use: No    Sexual activity: No     Other Topics Concern    Not on file     Social History Narrative       Current Outpatient Prescriptions:     apixaban 2.5 mg Tab, Take 1 tablet (2.5 mg total) by mouth 2 (two) times daily. (Patient taking differently: Take 10 mg by mouth 2 (two) times daily. ), Disp: 60 tablet, Rfl: 11    aripiprazole (ABILIFY) 10 MG  Tab, Take 1 tablet (10 mg total) by mouth once daily., Disp: 90 tablet, Rfl: 3    aspirin (ECOTRIN) 81 MG EC tablet, Take 1 tablet (81 mg total) by mouth once daily., Disp: , Rfl: 0    atorvastatin (LIPITOR) 10 MG tablet, Take 1 tablet (10 mg total) by mouth once daily., Disp: 90 tablet, Rfl: 3    cephALEXin (KEFLEX) 500 MG capsule, Take 1 capsule (500 mg total) by mouth every 8 (eight) hours., Disp: 21 capsule, Rfl: 0    donepezil (ARICEPT) 10 MG tablet, Take 1 tablet (10 mg total) by mouth once daily., Disp: 30 tablet, Rfl: 11    escitalopram oxalate (LEXAPRO) 10 MG tablet, Take 1 tablet (10 mg total) by mouth once daily., Disp: 90 tablet, Rfl: 3    memantine (NAMENDA) 10 MG Tab, Take 1 tablet (10 mg total) by mouth 2 (two) times daily., Disp: 180 tablet, Rfl: 3    metoprolol succinate (TOPROL-XL) 50 MG 24 hr tablet, Take 1 tablet (50 mg total) by mouth once daily., Disp: 30 tablet, Rfl: 11    mirabegron (MYRBETRIQ) 50 mg Tb24, Take 1 tablet (50 mg total) by mouth once daily., Disp: 30 tablet, Rfl: 11    mirtazapine (REMERON) 30 MG tablet, Take 1 tablet (30 mg total) by mouth every evening., Disp: 90 tablet, Rfl: 3    spironolactone (ALDACTONE) 25 MG tablet, Take 1 tablet (25 mg total) by mouth once daily., Disp: 90 tablet, Rfl: 0    triamterene-hydrochlorothiazide 37.5-25 mg (DYAZIDE) 37.5-25 mg per capsule, Take 1 capsule by mouth every morning., Disp: 90 capsule, Rfl: 3    lisinopril 10 MG tablet, Take 10 mg by mouth once daily., Disp: , Rfl:     REVIEW OF SYSTEMS:  General: negative; ENT: negative; Allergy and Immunology: negative; Hematological and Lymphatic: Negative; Endocrine: negative; Respiratory: no cough, shortness of breath, or wheezing; Cardiovascular: no chest pain or dyspnea on exertion; Gastrointestinal: no abdominal pain/back, change in bowel habits, or bloody stools; Genito-Urinary: dark urine noted with strong odor; no trouble voiding, or hematuria; Musculoskeletal: LLE with medial  and lateral fasciotomies   Neurological: no TIA or stroke symptoms; Psychiatric: no nervousness, anxiety or depression.    PHYSICAL EXAM:   Right Arm BP - Sittin/73 (17 1143)  Left Arm BP - Sittin/73 (17 1143)  Pulse: 83  Temp: 98.6 °F (37 °C)      General appearance:  Alert, well-appearing, and in no distress.  Oriented to person, place, and time   Neurological: Normal speech, no focal findings noted; CN II - XII grossly intact           Musculoskeletal: Digits/nail without cyanosis/clubbing.  Normal muscle strength/tone.                 Neck: Supple, no significant adenopathy; thyroid is not enlarged                  Chest:  Clear to auscultation, no wheezes, rales or rhonchi, symmetric air entry     No use of accessory muscles             Cardiac: Normal rate and regular rhythm, S1 and S2 normal; PMI non-displaced          Abdomen: Soft, nontender, nondistended, no masses or organomegaly     No rebound tenderness noted; bowel sounds normal     No groin adenopathy      Extremities:   Left Leg with lateral fasciotomy wound with staples in place. 100% granulation tissue noted. No necrotic tissue.       Left anterior fasciotomy healing well with staples to medial site and 6 staples to the lateral site.      +2 PT pulses bilaterally     No pre-tibial edema     No ulcerations    LAB RESULTS:  Lab Results   Component Value Date    K 4.1 2017    K 4.3 2017    K 4.3 2017    CREATININE 1.5 (H) 2017    CREATININE 1.2 2017    CREATININE 1.1 2017     Lab Results   Component Value Date    WBC 6.85 2017    WBC 6.54 2017    WBC 6.54 2017    HCT 35.8 (L) 2017    HCT 32.0 (L) 2017    HCT 30.0 (L) 2017     (H) 2017     (H) 2017     2017     No results found for: HGBA1C  IMAGING:  US: triphasic flow in L lower leg    IMP/PLAN:  82 y.o. female with dementia, HTN, paroxysmal atrial fibrillation with  left common femoral, popliteal embolus now s/p LLE thrombectomy and faciotomy - doing well  2+ R PT pulse  No infection; good granulation tissue  Cont eliquis and ASA  Decrease wound vac at home to 75 pressure  Family had not wanted STSG- almost healed; cautery done at bedside.  Fu in 1 yr    Rigo Bernard MD FACS

## 2017-06-07 ENCOUNTER — TELEPHONE (OUTPATIENT)
Dept: UROLOGY | Facility: CLINIC | Age: 82
End: 2017-06-07

## 2017-06-07 ENCOUNTER — HOSPITAL ENCOUNTER (OUTPATIENT)
Dept: RADIOLOGY | Facility: HOSPITAL | Age: 82
Discharge: HOME | End: 2017-06-07
Attending: UROLOGY
Payer: COMMERCIAL

## 2017-06-07 DIAGNOSIS — R35.0 URINARY FREQUENCY: ICD-10-CM

## 2017-06-07 PROCEDURE — 76770 US EXAM ABDO BACK WALL COMP: CPT | Mod: 26,,, | Performed by: RADIOLOGY

## 2017-06-07 PROCEDURE — 76770 US EXAM ABDO BACK WALL COMP: CPT | Mod: TC

## 2017-06-07 NOTE — TELEPHONE ENCOUNTER
Spoke to pt's daughter (brandon)  asked how mom was she states doing well she does wear diapers. i did advise her since pvr was 200 per ultrasound dept. For them to monitor her an if urination pattern changes to contact office. If her diapers after a few hours are dry also contact office or go to urgent care.  understands this./shantell

## 2017-06-16 ENCOUNTER — TELEPHONE (OUTPATIENT)
Dept: WOUND CARE | Facility: CLINIC | Age: 82
End: 2017-06-16

## 2017-06-16 NOTE — TELEPHONE ENCOUNTER
----- Message from Deb Strange RN sent at 6/15/2017  6:00 PM CDT -----  Contact: Elsy/ALAYNA  Attempted to call back.  Left a voicemail, informing Elsy that we tried to return her call.  Please see below and call Elsy back at [phone].      ----- Message -----  From: Lillian Oconnell  Sent: 6/15/2017  10:42 AM  To: Jasmyn Tavares Staff    Caller states she needs to speak with nurse in ref to wound vac.Please call Elsy back @ [phone].Thank you.    
Unknown

## 2017-06-21 ENCOUNTER — TELEPHONE (OUTPATIENT)
Dept: FAMILY MEDICINE | Facility: CLINIC | Age: 82
End: 2017-06-21

## 2017-06-21 DIAGNOSIS — R11.0 NAUSEA: Primary | ICD-10-CM

## 2017-06-21 RX ORDER — ONDANSETRON 4 MG/1
4 TABLET, ORALLY DISINTEGRATING ORAL EVERY 8 HOURS PRN
Qty: 12 TABLET | Refills: 0 | Status: SHIPPED | OUTPATIENT
Start: 2017-06-21 | End: 2017-06-21 | Stop reason: SDUPTHER

## 2017-06-21 RX ORDER — ONDANSETRON 4 MG/1
4 TABLET, ORALLY DISINTEGRATING ORAL EVERY 8 HOURS PRN
Qty: 12 TABLET | Refills: 0 | Status: SHIPPED | OUTPATIENT
Start: 2017-06-21 | End: 2017-07-18 | Stop reason: SDUPTHER

## 2017-06-21 NOTE — TELEPHONE ENCOUNTER
rx sent.  Spoke with david - pt now lives with her - previusly was with other sister.  Pt with nausea.  Low bp, has been off of lisinopril.  Dyspnea with walking previously tolerance was 2 blocks and now 1 block.  Has upcoming OV with cardiology

## 2017-06-21 NOTE — TELEPHONE ENCOUNTER
----- Message from Preethi Gonzalez sent at 6/21/2017  1:46 PM CDT -----  Contact: [phone]  Daughter called stating that patient has been suffering with nausea for 3 days and requesting either Zofran or Phenergan. Please send script to [facility] on [address].    Thanks!

## 2017-06-27 ENCOUNTER — OFFICE VISIT (OUTPATIENT)
Dept: CARDIOLOGY | Facility: CLINIC | Age: 82
End: 2017-06-27
Payer: COMMERCIAL

## 2017-06-27 VITALS
WEIGHT: 168.63 LBS | DIASTOLIC BLOOD PRESSURE: 56 MMHG | BODY MASS INDEX: 31.03 KG/M2 | OXYGEN SATURATION: 95 % | HEART RATE: 102 BPM | SYSTOLIC BLOOD PRESSURE: 90 MMHG | HEIGHT: 62 IN

## 2017-06-27 DIAGNOSIS — I48.0 PAROXYSMAL ATRIAL FIBRILLATION: Chronic | ICD-10-CM

## 2017-06-27 DIAGNOSIS — I82.432 ACUTE DEEP VEIN THROMBOSIS (DVT) OF POPLITEAL VEIN OF LEFT LOWER EXTREMITY: ICD-10-CM

## 2017-06-27 DIAGNOSIS — G30.9 ALZHEIMER'S DEMENTIA WITHOUT BEHAVIORAL DISTURBANCE, UNSPECIFIED TIMING OF DEMENTIA ONSET: Primary | Chronic | ICD-10-CM

## 2017-06-27 DIAGNOSIS — I10 HYPERTENSION: ICD-10-CM

## 2017-06-27 DIAGNOSIS — F02.80 ALZHEIMER'S DEMENTIA WITHOUT BEHAVIORAL DISTURBANCE, UNSPECIFIED TIMING OF DEMENTIA ONSET: Primary | Chronic | ICD-10-CM

## 2017-06-27 DIAGNOSIS — I50.22 CHRONIC SYSTOLIC CONGESTIVE HEART FAILURE: Chronic | ICD-10-CM

## 2017-06-27 DIAGNOSIS — N18.30 CKD (CHRONIC KIDNEY DISEASE) STAGE 3, GFR 30-59 ML/MIN: Chronic | ICD-10-CM

## 2017-06-27 DIAGNOSIS — I50.23 ACUTE ON CHRONIC SYSTOLIC CONGESTIVE HEART FAILURE: ICD-10-CM

## 2017-06-27 PROCEDURE — 99213 OFFICE O/P EST LOW 20 MIN: CPT | Mod: PBBFAC,25 | Performed by: INTERNAL MEDICINE

## 2017-06-27 PROCEDURE — 93005 ELECTROCARDIOGRAM TRACING: CPT | Mod: PBBFAC | Performed by: INTERNAL MEDICINE

## 2017-06-27 PROCEDURE — 1126F AMNT PAIN NOTED NONE PRSNT: CPT | Mod: S$GLB,,, | Performed by: INTERNAL MEDICINE

## 2017-06-27 PROCEDURE — 99214 OFFICE O/P EST MOD 30 MIN: CPT | Mod: S$GLB,,, | Performed by: INTERNAL MEDICINE

## 2017-06-27 PROCEDURE — 99999 PR PBB SHADOW E&M-EST. PATIENT-LVL III: CPT | Mod: PBBFAC,,, | Performed by: INTERNAL MEDICINE

## 2017-06-27 PROCEDURE — 1159F MED LIST DOCD IN RCRD: CPT | Mod: S$GLB,,, | Performed by: INTERNAL MEDICINE

## 2017-06-27 PROCEDURE — 1157F ADVNC CARE PLAN IN RCRD: CPT | Mod: S$GLB,,, | Performed by: INTERNAL MEDICINE

## 2017-06-27 PROCEDURE — 93000 ELECTROCARDIOGRAM COMPLETE: CPT | Mod: S$GLB,,, | Performed by: INTERNAL MEDICINE

## 2017-07-05 ENCOUNTER — HOSPITAL ENCOUNTER (OUTPATIENT)
Dept: CARDIOLOGY | Facility: HOSPITAL | Age: 82
Discharge: HOME | End: 2017-07-05
Attending: INTERNAL MEDICINE
Payer: COMMERCIAL

## 2017-07-05 DIAGNOSIS — F02.80 ALZHEIMER'S DEMENTIA WITHOUT BEHAVIORAL DISTURBANCE, UNSPECIFIED TIMING OF DEMENTIA ONSET: Chronic | ICD-10-CM

## 2017-07-05 DIAGNOSIS — N18.30 CKD (CHRONIC KIDNEY DISEASE) STAGE 3, GFR 30-59 ML/MIN: Chronic | ICD-10-CM

## 2017-07-05 DIAGNOSIS — I48.0 PAROXYSMAL ATRIAL FIBRILLATION: Chronic | ICD-10-CM

## 2017-07-05 DIAGNOSIS — G30.9 ALZHEIMER'S DEMENTIA WITHOUT BEHAVIORAL DISTURBANCE, UNSPECIFIED TIMING OF DEMENTIA ONSET: Chronic | ICD-10-CM

## 2017-07-05 DIAGNOSIS — I50.22 CHRONIC SYSTOLIC CONGESTIVE HEART FAILURE: Chronic | ICD-10-CM

## 2017-07-05 DIAGNOSIS — I82.432 ACUTE DEEP VEIN THROMBOSIS (DVT) OF POPLITEAL VEIN OF LEFT LOWER EXTREMITY: ICD-10-CM

## 2017-07-05 DIAGNOSIS — I50.23 ACUTE ON CHRONIC SYSTOLIC CONGESTIVE HEART FAILURE: ICD-10-CM

## 2017-07-05 LAB
ESTIMATED PA SYSTOLIC PRESSURE: 30.67
GLOBAL PERICARDIAL EFFUSION: NORMAL
MITRAL VALVE MOBILITY: NORMAL
MITRAL VALVE REGURGITATION: NORMAL
RETIRED EF AND QEF - SEE NOTES: 55 (ref 55–65)
TRICUSPID VALVE REGURGITATION: NORMAL

## 2017-07-05 PROCEDURE — 93306 TTE W/DOPPLER COMPLETE: CPT

## 2017-07-05 PROCEDURE — 93306 TTE W/DOPPLER COMPLETE: CPT | Mod: 26,,, | Performed by: INTERNAL MEDICINE

## 2017-07-12 ENCOUNTER — OFFICE VISIT (OUTPATIENT)
Dept: CARDIOLOGY | Facility: CLINIC | Age: 82
End: 2017-07-12
Payer: COMMERCIAL

## 2017-07-12 VITALS
DIASTOLIC BLOOD PRESSURE: 72 MMHG | HEIGHT: 62 IN | WEIGHT: 168.88 LBS | OXYGEN SATURATION: 93 % | SYSTOLIC BLOOD PRESSURE: 128 MMHG | BODY MASS INDEX: 31.08 KG/M2 | HEART RATE: 68 BPM

## 2017-07-12 DIAGNOSIS — F02.80 ALZHEIMER'S DEMENTIA WITHOUT BEHAVIORAL DISTURBANCE, UNSPECIFIED TIMING OF DEMENTIA ONSET: Chronic | ICD-10-CM

## 2017-07-12 DIAGNOSIS — G30.9 ALZHEIMER'S DEMENTIA WITHOUT BEHAVIORAL DISTURBANCE, UNSPECIFIED TIMING OF DEMENTIA ONSET: Chronic | ICD-10-CM

## 2017-07-12 DIAGNOSIS — I50.22 CHRONIC SYSTOLIC CONGESTIVE HEART FAILURE: Chronic | ICD-10-CM

## 2017-07-12 DIAGNOSIS — I48.0 PAROXYSMAL ATRIAL FIBRILLATION: Primary | Chronic | ICD-10-CM

## 2017-07-12 DIAGNOSIS — I82.432 ACUTE DEEP VEIN THROMBOSIS (DVT) OF POPLITEAL VEIN OF LEFT LOWER EXTREMITY: ICD-10-CM

## 2017-07-12 PROCEDURE — 1159F MED LIST DOCD IN RCRD: CPT | Mod: S$GLB,,, | Performed by: INTERNAL MEDICINE

## 2017-07-12 PROCEDURE — 1157F ADVNC CARE PLAN IN RCRD: CPT | Mod: S$GLB,,, | Performed by: INTERNAL MEDICINE

## 2017-07-12 PROCEDURE — 99999 PR PBB SHADOW E&M-EST. PATIENT-LVL III: CPT | Mod: PBBFAC,,, | Performed by: INTERNAL MEDICINE

## 2017-07-12 PROCEDURE — 1126F AMNT PAIN NOTED NONE PRSNT: CPT | Mod: S$GLB,,, | Performed by: INTERNAL MEDICINE

## 2017-07-12 PROCEDURE — 99213 OFFICE O/P EST LOW 20 MIN: CPT | Mod: PBBFAC | Performed by: INTERNAL MEDICINE

## 2017-07-12 PROCEDURE — 99213 OFFICE O/P EST LOW 20 MIN: CPT | Mod: S$GLB,,, | Performed by: INTERNAL MEDICINE

## 2017-07-12 NOTE — PROGRESS NOTES
Subjective:    Patient ID:  Boubacar Chen is a 82 y.o. female who presents for follow-up of Results      HPI     Dementia, cardiomyopathy - EF 20% ( improved to 55% by echo 7/5/17),  HTN, paroxysmal atrial fibrillation on eliquis, left common femoral, popliteal embolus now s/p LLE thrombectomy and faciotomy 2/14/17     Referred by Dr Mejia  Previously followed by Dr Ness     Followed by vascular surgery at [facility]  Patient is a 82 y.o. female with dementia, HTN, paroxysmal atrial fibrillation with left common femoral, popliteal embolus now s/p LLE thrombectomy and faciotomy who is here today for evaluation of fasciotomy wound.  Pt's daughters are here and report Home Health is visiting for wound care and PT ([facility]: [phone]).  The patient's wound vac just arrived and the Home Health agency is requesting wound vac orders.       Echo 2/15/17    1 - Eccentric hypertrophy.     2 - Severely depressed left ventricular systolic function (EF 20-25%).     3 - Moderate left atrial enlargement.     4 - Moderately depressed right ventricular systolic function .     5 - Mild mitral regurgitation.     6 - Intermediate central venous pressure.     Echo 7/5/17    1 - Normal left ventricular systolic function (EF 55-60%).     2 - No wall motion abnormalities.     3 - Eccentric hypertrophy.     4 - Mildly depressed right ventricular systolic function .     5 - Mild MV prolapse with mild to moderate mitral regurgitation.     6 - Mild tricuspid regurgitation.     7 - The estimated PA systolic pressure is 31 mmHg.      Dementia is advanced - she was living in a NH - now with her daughter  CHAUDHRY has worsened recently  BP runs low    Labs 7/5/17  K 4.1  Cr 1.0      BP improved  CHAUDHRY improved  Denies CP    Review of Systems   Constitution: Negative for decreased appetite.   HENT: Negative for ear discharge.    Eyes: Negative for blurred vision.   Respiratory: Negative for hemoptysis.    Endocrine: Negative for  polyphagia.   Hematologic/Lymphatic: Negative for adenopathy.   Skin: Negative for color change.   Musculoskeletal: Negative for joint swelling.   Neurological: Negative for brief paralysis.   Psychiatric/Behavioral: Negative for hallucinations.        Objective:    Physical Exam   Constitutional: She is oriented to person, place, and time. She appears well-developed and well-nourished.   HENT:   Head: Normocephalic and atraumatic.   Eyes: Conjunctivae are normal. Pupils are equal, round, and reactive to light.   Neck: Normal range of motion. Neck supple.   Cardiovascular: Normal rate, normal heart sounds and intact distal pulses.    Pulmonary/Chest: Effort normal and breath sounds normal.   Abdominal: Soft. Bowel sounds are normal.   Musculoskeletal: Normal range of motion.   Neurological: She is alert and oriented to person, place, and time.   Skin: Skin is warm and dry.         Assessment:       1. Paroxysmal atrial fibrillation    2. Chronic systolic congestive heart failure    3. Acute deep vein thrombosis (DVT) of popliteal vein of left lower extremity    4. Alzheimer's dementia without behavioral disturbance, unspecified timing of dementia onset         Plan:       EF significantly improved on current echo  BP more stable  OV 3 months

## 2017-07-18 ENCOUNTER — OFFICE VISIT (OUTPATIENT)
Dept: FAMILY MEDICINE | Facility: CLINIC | Age: 82
End: 2017-07-18
Payer: MEDICARE

## 2017-07-18 VITALS
WEIGHT: 172.63 LBS | HEART RATE: 90 BPM | BODY MASS INDEX: 29.47 KG/M2 | HEIGHT: 64 IN | TEMPERATURE: 98 F | SYSTOLIC BLOOD PRESSURE: 110 MMHG | OXYGEN SATURATION: 93 % | DIASTOLIC BLOOD PRESSURE: 71 MMHG

## 2017-07-18 DIAGNOSIS — F02.80 ALZHEIMER'S DEMENTIA WITHOUT BEHAVIORAL DISTURBANCE, UNSPECIFIED TIMING OF DEMENTIA ONSET: Primary | Chronic | ICD-10-CM

## 2017-07-18 DIAGNOSIS — Z78.0 ASYMPTOMATIC MENOPAUSAL STATE: ICD-10-CM

## 2017-07-18 DIAGNOSIS — R21 RASH: ICD-10-CM

## 2017-07-18 DIAGNOSIS — R11.0 NAUSEA: ICD-10-CM

## 2017-07-18 DIAGNOSIS — G47.00 INSOMNIA, UNSPECIFIED TYPE: ICD-10-CM

## 2017-07-18 DIAGNOSIS — Z13.820 SCREENING FOR OSTEOPOROSIS: ICD-10-CM

## 2017-07-18 DIAGNOSIS — G30.9 ALZHEIMER'S DEMENTIA WITHOUT BEHAVIORAL DISTURBANCE, UNSPECIFIED TIMING OF DEMENTIA ONSET: Primary | Chronic | ICD-10-CM

## 2017-07-18 PROCEDURE — 99214 OFFICE O/P EST MOD 30 MIN: CPT | Mod: PBBFAC,PO | Performed by: INTERNAL MEDICINE

## 2017-07-18 PROCEDURE — 99999 PR PBB SHADOW E&M-EST. PATIENT-LVL IV: CPT | Mod: PBBFAC,,, | Performed by: INTERNAL MEDICINE

## 2017-07-18 PROCEDURE — 1157F ADVNC CARE PLAN IN RCRD: CPT | Mod: S$GLB,,, | Performed by: INTERNAL MEDICINE

## 2017-07-18 PROCEDURE — 1126F AMNT PAIN NOTED NONE PRSNT: CPT | Mod: S$GLB,,, | Performed by: INTERNAL MEDICINE

## 2017-07-18 PROCEDURE — 99214 OFFICE O/P EST MOD 30 MIN: CPT | Mod: S$GLB,,, | Performed by: INTERNAL MEDICINE

## 2017-07-18 PROCEDURE — 1159F MED LIST DOCD IN RCRD: CPT | Mod: S$GLB,,, | Performed by: INTERNAL MEDICINE

## 2017-07-18 RX ORDER — TRAZODONE HYDROCHLORIDE 50 MG/1
TABLET ORAL
Qty: 30 TABLET | Refills: 11 | Status: SHIPPED | OUTPATIENT
Start: 2017-07-18 | End: 2017-09-13 | Stop reason: SDUPTHER

## 2017-07-18 RX ORDER — ONDANSETRON 4 MG/1
4 TABLET, ORALLY DISINTEGRATING ORAL EVERY 12 HOURS PRN
Qty: 60 TABLET | Refills: 5 | Status: SHIPPED | OUTPATIENT
Start: 2017-07-18 | End: 2017-11-09 | Stop reason: SDUPTHER

## 2017-07-18 NOTE — PATIENT INSTRUCTIONS
If trazodone doesn't work or has SE, call - will try lose dose seroquel.    Stop the remeron and use trazodone instead    Stay on all other meds

## 2017-07-18 NOTE — PROGRESS NOTES
This note was created by combination of typed  and Dragon dictation.  Transcription errors may be present.  If there are any questions, please contact me.    Assessment & Plan  Alzheimer's dementia without behavioral disturbance, unspecified timing of dementia onset  Insomnia, unspecified type  -Remeron low-dose is better suited for insomnia, the higher dose not as much.  Trying to keep the regimen simple, we'll try her with trazodone low-dose, can increase as needed or until side effects develop.  If still uncontrolled may consider low-dose Seroquel.  -     trazodone (DESYREL) 50 MG tablet; Start with 1/2 tablet near bedtime; up to 2 tablets at a time if needed  Dispense: 30 tablet; Refill: 11    Nausea-she takes it twice a day, refilled.  -     ondansetron (ZOFRAN-ODT) 4 MG TbDL; Take 1 tablet (4 mg total) by mouth every 12 (twelve) hours as needed.  Dispense: 60 tablet; Refill: 5    Asymptomatic menopausal state   Screening for osteoporosis- start taking multivitamin with calcium and iron.  If positive for osteoporosis I would avoid oral bisphosphonate but I would consider IV bisphosphonate.  Dementia, would like to prevent fracture.  -     DXA Bone Density Spine And Hip; Future; Expected date: 07/18/2017    Rash-looks more like an abrasion, abscess scratch, more than it does from bedsore.  She can try and apply zinc oxide to the area but it looks like it's on its way to healing.  -     zinc oxide 10 % Crea; Apply to affected area daily to twice a day  Dispense: 50 g; Refill: 0    Other orders  -     Cancel: Lipid panel; Future; Expected date: 07/18/2017  -     Cancel: DXA Bone Density Appendicular Skeleton; Future; Expected date: 07/18/2017        There are no discontinued medications.    Follow-up: No Follow-up on file. plan for follow-up 3 months.      =================================================================      Chief Complaint   Patient presents with    Medication Refill     Discuss meds     Results     Labs    Sinusitis       HPI  Boubacar is a 82 y.o. female, last appointment with this clinic was 3/8/2017.    No LMP recorded. Patient has had a hysterectomy.    Last seen in March.   Here with her daughters from whom the history is obtained.  Secondary to dementia in the patient.    EGD 12/30/2015 biopsies negative.  Hiatal hernia.  constipation.  1/6/2015 colonoscopy internal hemorrhoids.  DVT; 2/14/2017 LE US Deep venous thrombosis left popliteal vein nearly completely occlusive; Left popliteal cyst; on anticoagulation for this as well as for AFib.  peripheral artery disease legs: 2/14/2017 LE arterial dopplers: No elevated systolic velocity suggesting hemodynamically significant narrowing in the visualized arteries of the right lower extremity. Left lower extremity; occluded left common femoral artery and proximal superficial femoral artery with minimal flow seen in the mid to distal superficial femoral artery and proximal popliteal artery, with occluded distal popliteal artery and anterior tibial and peroneal are not visualized likely occluded and very low flow in the posterior tibial artery  2/15/2017 underwent thrombectomy and fasciotomy  C spine fracture: hx of C spine fracture after a fall; daughter notes pt did not wear the C spine collar during recovery and it fractured again; surgeon did not want to do re-do.  11/19/2016 CT C spine: Incompletely healed chronic appearing type II dens fracture which is status post ORIF as as discussed above. No definite new additional fracture is identified in the prior studies are available for comparison. There severe cervical spondylosis.  Incidental findings also include vascular calcification and a heterogeneous goiter  12/9/2015 renal US: Right renal cysts.  Elevated resistive indices suggesting intrinsic renal disease.  No hydronephrosis.  Urinary bladder empty at the time of the exam.  AFib, anticoagulation; found 2/2017 hospitalization; restarted  metoprolol in March.   CHF: 2/15/2017 TTE LVEF 20-25%; improved to 55% on TTE 7/5/2017.  hx of CVA  Dementia, Abilify, Namenda, Aricept.  Remeron  Anxiety - Remeron and lexapro    The Remeron- family wonders if pt developing tolerance b/c not sleeping very well.  Will wake up in the middle of the night.  The Namenda - BID; around noon - more confusion.   The other night - pt woke up and left the apartment - she lives with daughter.  Daughter did not hear her.  Plan is to get another lock for the door.  Family notes skin breakdown on the buttock that is hardening. desitin and curtis's butt paste.  Triple antibiotic cream OTC with some improvement.    Patient Care Team:  Fabian Mejia MD as PCP - General (Internal Medicine)  Ed Ness MD as Consulting Physician (Cardiology)  Neal Bond MD as Consulting Physician (Neurology)  Rigo Bernard MD as Consulting Physician (Vascular Surgery)    Patient Active Problem List    Diagnosis Date Noted    Acute on chronic systolic congestive heart failure 06/27/2017    Critical lower limb ischemia     Surgical wound infection 05/03/2017    Disruption of external operation (surgical) wound 05/03/2017    Abnormal granulation tissue 05/03/2017    Acute deep vein thrombosis (DVT) of popliteal vein of left lower extremity 03/07/2017 2/14/2017 LE US Deep venous thrombosis left popliteal vein nearly completely occlusive; Left popliteal cyst      CKD (chronic kidney disease) stage 3, GFR 30-59 ml/min 03/07/2017 12/9/2015 renal US: Right renal cysts.  Elevated resistive indices suggesting intrinsic renal disease.  No hydronephrosis.  Urinary bladder empty at the time of the exam.      Dementia     CHF (congestive heart failure) 02/15/2017     2/15/2017 TTE: CONCLUSIONS 1 - Eccentric hypertrophy. 2 - Severely depressed left ventricular systolic function (EF 20-25%). 3 - Moderate left atrial enlargement. 4 - Moderately depressed right ventricular  systolic function . 5 - Mild mitral regurgitation. 6 - Intermediate central venous pressure.       Paroxysmal atrial fibrillation     Odontoid fracture with routine healing 11/19/2016 11/19/2016 CT C spine: Incompletely healed chronic appearing type II dens fracture which is status post ORIF as as discussed above. No definite new additional fracture is identified in the prior studies are available for comparison. There severe cervical spondylosis.  Incidental findings also include vascular calcification and a heterogeneous goiter      Epigastric abdominal pain 12/30/2015     EGD 12/30/2015 biopsies negative.  Hiatal hernia.      Diarrhea 11/03/2014 1/6/2015 colonoscopy internal hemorrhoids.      Lower urinary tract infectious disease 10/07/2013       PAST MEDICAL HISTORY:  Past Medical History:   Diagnosis Date    Anxiety     Arthritis     Dementia     Former smoker     Hyperlipidemia     Hypertension     Stroke     at age 58       PAST SURGICAL HISTORY:  Past Surgical History:   Procedure Laterality Date    ANTERIOR VAGINAL REPAIR  1960's    COLONOSCOPY  2008    COLONOSCOPY N/A 1/5/2016    Procedure: COLONOSCOPY;  Surgeon: Daniel Alicea MD;  Location: [facility];  Service: Endoscopy;  Laterality: N/A;    HYSTERECTOMY      total    JOINT REPLACEMENT      bilateral hip    UPPER GASTROINTESTINAL ENDOSCOPY  2008     h. pylori       SOCIAL HISTORY:  Social History     Social History    Marital status:      Spouse name: N/A    Number of children: N/A    Years of education: N/A     Occupational History    Not on file.     Social History Main Topics    Smoking status: Former Smoker     Packs/day: 1.00     Years: 25.00     Types: Cigarettes     Quit date: 10/7/1998    Smokeless tobacco: Never Used    Alcohol use No      Comment: occ    Drug use: No    Sexual activity: No     Other Topics Concern    Not on file     Social History Narrative    No narrative on file  "      ALLERGIES AND MEDICATIONS: updated and reviewed.  Review of patient's allergies indicates:  No Known Allergies  Current Outpatient Prescriptions   Medication Sig Dispense Refill    aripiprazole (ABILIFY) 10 MG Tab Take 1 tablet (10 mg total) by mouth once daily. 90 tablet 3    aspirin (ECOTRIN) 81 MG EC tablet Take 1 tablet (81 mg total) by mouth once daily.  0    atorvastatin (LIPITOR) 10 MG tablet Take 1 tablet (10 mg total) by mouth once daily. 90 tablet 3    donepezil (ARICEPT) 10 MG tablet Take 1 tablet (10 mg total) by mouth once daily. 30 tablet 11    escitalopram oxalate (LEXAPRO) 10 MG tablet Take 1 tablet (10 mg total) by mouth once daily. 90 tablet 3    memantine (NAMENDA) 10 MG Tab Take 1 tablet (10 mg total) by mouth 2 (two) times daily. 180 tablet 3    metoprolol succinate (TOPROL-XL) 50 MG 24 hr tablet Take 1 tablet (50 mg total) by mouth once daily. 30 tablet 11    mirabegron (MYRBETRIQ) 50 mg Tb24 Take 1 tablet (50 mg total) by mouth once daily. 30 tablet 11    mirtazapine (REMERON) 30 MG tablet Take 1 tablet (30 mg total) by mouth every evening. 90 tablet 3    ondansetron (ZOFRAN-ODT) 4 MG TbDL Take 1 tablet (4 mg total) by mouth every 8 (eight) hours as needed. 12 tablet 0    apixaban 2.5 mg Tab Take 1 tablet (2.5 mg total) by mouth 2 (two) times daily. (Patient taking differently: Take 10 mg by mouth 2 (two) times daily. ) 60 tablet 11     No current facility-administered medications for this visit.        ROS    Physical Exam   Vitals:    07/18/17 1310   BP: 110/71   BP Location: Right arm   Patient Position: Sitting   BP Method: Automatic   Pulse: 90   Temp: 98.2 °F (36.8 °C)   TempSrc: Oral   SpO2: (!) 93%   Weight: 78.3 kg (172 lb 9.9 oz)   Height: 5' 3.5" (1.613 m)    Body mass index is 30.1 kg/m².  Weight: 78.3 kg (172 lb 9.9 oz)   Height: 5' 3.5" (161.3 cm)     Physical Exam   Constitutional: She appears well-developed and well-nourished. No distress.   Eyes: EOM are " normal.   Cardiovascular: Normal rate, regular rhythm and normal heart sounds.    No murmur heard.  Pulmonary/Chest: Effort normal and breath sounds normal.   Musculoskeletal: Normal range of motion.   Neurological: She is alert. She exhibits normal muscle tone. Coordination normal.   Skin: Skin is warm and dry.   On the right buttock, towards the abbie cleft, looks sick a healing abrasion, without surrounding induration or erythema.  Maybe some generalized mild erythema spanning the midline of the abbie cleft superiorly without weeping or skin breakdown   Psychiatric: She has a normal mood and affect.   Quiet throughout the exam

## 2017-09-13 ENCOUNTER — OFFICE VISIT (OUTPATIENT)
Dept: FAMILY MEDICINE | Facility: CLINIC | Age: 82
End: 2017-09-13
Payer: MEDICARE

## 2017-09-13 VITALS
BODY MASS INDEX: 29.53 KG/M2 | HEART RATE: 78 BPM | DIASTOLIC BLOOD PRESSURE: 84 MMHG | SYSTOLIC BLOOD PRESSURE: 148 MMHG | WEIGHT: 173 LBS | HEIGHT: 64 IN | TEMPERATURE: 98 F

## 2017-09-13 DIAGNOSIS — R31.9 HEMATURIA: Primary | ICD-10-CM

## 2017-09-13 DIAGNOSIS — G47.00 INSOMNIA, UNSPECIFIED TYPE: ICD-10-CM

## 2017-09-13 DIAGNOSIS — G30.9 ALZHEIMER'S DEMENTIA WITHOUT BEHAVIORAL DISTURBANCE, UNSPECIFIED TIMING OF DEMENTIA ONSET: Chronic | ICD-10-CM

## 2017-09-13 DIAGNOSIS — R09.81 NASAL CONGESTION: ICD-10-CM

## 2017-09-13 DIAGNOSIS — G43.909 MIGRAINE WITHOUT STATUS MIGRAINOSUS, NOT INTRACTABLE, UNSPECIFIED MIGRAINE TYPE: ICD-10-CM

## 2017-09-13 DIAGNOSIS — F02.80 ALZHEIMER'S DEMENTIA WITHOUT BEHAVIORAL DISTURBANCE, UNSPECIFIED TIMING OF DEMENTIA ONSET: Chronic | ICD-10-CM

## 2017-09-13 PROCEDURE — 3077F SYST BP >= 140 MM HG: CPT | Mod: S$GLB,,, | Performed by: INTERNAL MEDICINE

## 2017-09-13 PROCEDURE — 99999 PR PBB SHADOW E&M-EST. PATIENT-LVL III: CPT | Mod: PBBFAC,,, | Performed by: INTERNAL MEDICINE

## 2017-09-13 PROCEDURE — 1157F ADVNC CARE PLAN IN RCRD: CPT | Mod: S$GLB,,, | Performed by: INTERNAL MEDICINE

## 2017-09-13 PROCEDURE — 3079F DIAST BP 80-89 MM HG: CPT | Mod: S$GLB,,, | Performed by: INTERNAL MEDICINE

## 2017-09-13 PROCEDURE — 99213 OFFICE O/P EST LOW 20 MIN: CPT | Mod: PBBFAC,PO | Performed by: INTERNAL MEDICINE

## 2017-09-13 PROCEDURE — 1126F AMNT PAIN NOTED NONE PRSNT: CPT | Mod: S$GLB,,, | Performed by: INTERNAL MEDICINE

## 2017-09-13 PROCEDURE — 99214 OFFICE O/P EST MOD 30 MIN: CPT | Mod: S$GLB,,, | Performed by: INTERNAL MEDICINE

## 2017-09-13 PROCEDURE — 1159F MED LIST DOCD IN RCRD: CPT | Mod: S$GLB,,, | Performed by: INTERNAL MEDICINE

## 2017-09-13 RX ORDER — SUMATRIPTAN 50 MG/1
50 TABLET, FILM COATED ORAL DAILY
Qty: 9 TABLET | Refills: 1 | Status: SHIPPED | OUTPATIENT
Start: 2017-09-13 | End: 2017-11-09 | Stop reason: SDUPTHER

## 2017-09-13 RX ORDER — FLUTICASONE PROPIONATE 50 MCG
1 SPRAY, SUSPENSION (ML) NASAL DAILY
Qty: 16 G | Refills: 11 | Status: SHIPPED | OUTPATIENT
Start: 2017-09-13

## 2017-09-13 RX ORDER — MIRTAZAPINE 15 MG/1
15 TABLET, FILM COATED ORAL NIGHTLY
Qty: 30 TABLET | Refills: 11 | Status: SHIPPED | OUTPATIENT
Start: 2017-09-13 | End: 2017-10-24

## 2017-09-13 RX ORDER — TRAZODONE HYDROCHLORIDE 50 MG/1
150 TABLET ORAL NIGHTLY
Qty: 90 TABLET | Refills: 11 | Status: SHIPPED | OUTPATIENT
Start: 2017-09-13 | End: 2017-10-24

## 2017-09-13 RX ORDER — MEMANTINE HYDROCHLORIDE 10 MG/1
10 TABLET ORAL 2 TIMES DAILY
Qty: 180 TABLET | Refills: 3 | Status: SHIPPED | OUTPATIENT
Start: 2017-09-13

## 2017-09-13 NOTE — PROGRESS NOTES
This note was created by combination of typed  and Dragon dictation.  Transcription errors may be present.  If there are any questions, please contact me.    Assessment & Plan  Hematuria-unable to leave a urine sample today, sent family home with sample container.  -     Urinalysis; Future; Expected date: 09/13/2017  -     Urine culture; Future; Expected date: 09/13/2017    Nasal congestion-family notes congestion with clear discharge and possible contribution to headaches.  Trial of Flonase.  -     fluticasone (FLONASE) 50 mcg/actuation nasal spray; 1 spray by Each Nare route once daily.  Dispense: 16 g; Refill: 11    Insomnia, unspecified type  Alzheimer's dementia without behavioral disturbance, unspecified timing of dementia onset-trazodone she can take 2:58 at night.  She can also take 1 around midafternoon if she is agitated.  Stay on Lexapro.  Family would like a prescription for Remeron to have just in case for breakthrough insomnia.  I sent that in to the pharmacy.  -     trazodone (DESYREL) 50 MG tablet; Take 3 tablets (150 mg total) by mouth every evening. Start with 1/2 tablet near bedtime; up to 2 tablets at a time if needed  Dispense: 90 tablet; Refill: 11  -     mirtazapine (REMERON) 15 MG tablet; Take 1 tablet (15 mg total) by mouth every evening.  Dispense: 30 tablet; Refill: 11  -     memantine (NAMENDA) 10 MG Tab; Take 1 tablet (10 mg total) by mouth 2 (two) times daily.  Dispense: 180 tablet; Refill: 3    Migraine without status migrainosus, not intractable, unspecified migraine type-sent in prescription for I'm Imitrex.  Patient with a history of migraines in the past.  -     sumatriptan (IMITREX) 50 MG tablet; Take 1 tablet (50 mg total) by mouth once daily.  Dispense: 9 tablet; Refill: 1    Medications Discontinued During This Encounter   Medication Reason    trazodone (DESYREL) 50 MG tablet Reorder    memantine (NAMENDA) 10 MG Tab Reorder       Follow-up: No Follow-up on  file.      =================================================================      Chief Complaint   Patient presents with    Headache    Hematuria       BRADY Hamlin is a 82 y.o. female, last appointment with this clinic was 7/18/2017.    History obtained from the family, patient with advanced dementia    No LMP recorded. Patient has had a hysterectomy.    EGD 12/30/2015 biopsies negative.  Hiatal hernia.  constipation.  1/6/2015 colonoscopy internal hemorrhoids.  DVT; 2/14/2017 LE US Deep venous thrombosis left popliteal vein nearly completely occlusive; Left popliteal cyst; on anticoagulation for this as well as for AFib.  peripheral artery disease legs: 2/14/2017 LE arterial dopplers: No elevated systolic velocity suggesting hemodynamically significant narrowing in the visualized arteries of the right lower extremity. Left lower extremity; occluded left common femoral artery and proximal superficial femoral artery with minimal flow seen in the mid to distal superficial femoral artery and proximal popliteal artery, with occluded distal popliteal artery and anterior tibial and peroneal are not visualized likely occluded and very low flow in the posterior tibial artery  2/15/2017 underwent thrombectomy and fasciotomy  C spine fracture: hx of C spine fracture after a fall; daughter notes pt did not wear the C spine collar during recovery and it fractured again; surgeon did not want to do re-do.  11/19/2016 CT C spine: Incompletely healed chronic appearing type II dens fracture which is status post ORIF as as discussed above. No definite new additional fracture is identified in the prior studies are available for comparison. There severe cervical spondylosis.  Incidental findings also include vascular calcification and a heterogeneous goiter  12/9/2015 renal US: Right renal cysts.  Elevated resistive indices suggesting intrinsic renal disease.  No hydronephrosis.  Urinary bladder empty at the time of the exam.  AFib,  anticoagulation; found 2/2017 hospitalization; restarted metoprolol in March.   CHF: 2/15/2017 TTE LVEF 20-25%; improved to 55% on TTE 7/5/2017.  hx of CVA  Dementia, Abilify, Namenda, Aricept.  Remeron  Anxiety - Remeron and lexapro.  7/2017 started on trazodone and stopped remeron.  Consider seroquel if worsening agitation.    Daughter david saw blood in the pad (urinary incontinence).  She is very sure that this is coming from the urine and not from the stools.  Patient has some loose stools.  She eats plenty of fruits and vegetables.  Patient does not seem to be complaining of anything.    Daughter notes pt will c/o headache in the past 2 weeks.  Some nasal congestion.  Tried zyrtec without relief.  One day she vomited.  Daughter tried pt with imitrex (pt with migraines in the past).  Did seem to help.  Daughter is requesting that the patient get her own perception for I'm Imitrex.  History of Fioricet as well as fevers it with codeine.  Plain Fioricet didn't help with headaches in the past but diffuse it with codeine did.  I would like to minimize her exposure to narcotics given her advanced dementia.    Daughter notes that the patient generally does well with the trazodone and it does help her sleep but she doesn't sleep through the night.  She estimates maybe 5 out of 30 days in a month the patient will get very agitated in the afternoons and want to leave the apartment.  Daughter had previously overlapped the Remeron with the trazodone and found the both did help.  She is currently on monotherapy with trazodone 2 tablets a day, denies obvious side effects.  Daughter would like to have Remeron on hand in case.  However I will try a patient with trazodone as needed if she starts getting agitated in mid afternoon and then keep 2 at night.    Patient Care Team:  Fabian Mejia MD as PCP - General (Internal Medicine)  Ed Ness MD as Consulting Physician (Cardiology)  Neal Bond MD as Consulting  Physician (Neurology)  Rigo Bernard MD as Consulting Physician (Vascular Surgery)    Patient Active Problem List    Diagnosis Date Noted    Acute on chronic systolic congestive heart failure 06/27/2017    Critical lower limb ischemia     Surgical wound infection 05/03/2017    Disruption of external operation (surgical) wound 05/03/2017    Abnormal granulation tissue 05/03/2017    Acute deep vein thrombosis (DVT) of popliteal vein of left lower extremity 03/07/2017 2/14/2017 LE US Deep venous thrombosis left popliteal vein nearly completely occlusive; Left popliteal cyst      CKD (chronic kidney disease) stage 3, GFR 30-59 ml/min 03/07/2017 12/9/2015 renal US: Right renal cysts.  Elevated resistive indices suggesting intrinsic renal disease.  No hydronephrosis.  Urinary bladder empty at the time of the exam.      Dementia     CHF (congestive heart failure) 02/15/2017     2/15/2017 TTE: CONCLUSIONS 1 - Eccentric hypertrophy. 2 - Severely depressed left ventricular systolic function (EF 20-25%). 3 - Moderate left atrial enlargement. 4 - Moderately depressed right ventricular systolic function . 5 - Mild mitral regurgitation. 6 - Intermediate central venous pressure.       Paroxysmal atrial fibrillation     Odontoid fracture with routine healing 11/19/2016 11/19/2016 CT C spine: Incompletely healed chronic appearing type II dens fracture which is status post ORIF as as discussed above. No definite new additional fracture is identified in the prior studies are available for comparison. There severe cervical spondylosis.  Incidental findings also include vascular calcification and a heterogeneous goiter      Epigastric abdominal pain 12/30/2015     EGD 12/30/2015 biopsies negative.  Hiatal hernia.      Diarrhea 11/03/2014 1/6/2015 colonoscopy internal hemorrhoids.      Lower urinary tract infectious disease 10/07/2013       PAST MEDICAL HISTORY:  Past Medical History:   Diagnosis Date     Anxiety     Arthritis     Dementia     Former smoker     Hyperlipidemia     Hypertension     Stroke     at age 58       PAST SURGICAL HISTORY:  Past Surgical History:   Procedure Laterality Date    ANTERIOR VAGINAL REPAIR  1960's    COLONOSCOPY  2008    COLONOSCOPY N/A 1/5/2016    Procedure: COLONOSCOPY;  Surgeon: Daniel Alicea MD;  Location: [facility];  Service: Endoscopy;  Laterality: N/A;    HYSTERECTOMY      total    JOINT REPLACEMENT      bilateral hip    UPPER GASTROINTESTINAL ENDOSCOPY  2008     h. pylori       SOCIAL HISTORY:  Social History     Social History    Marital status:      Spouse name: N/A    Number of children: N/A    Years of education: N/A     Occupational History    Not on file.     Social History Main Topics    Smoking status: Former Smoker     Packs/day: 1.00     Years: 25.00     Types: Cigarettes     Quit date: 10/7/1998    Smokeless tobacco: Never Used    Alcohol use No      Comment: occ    Drug use: No    Sexual activity: No     Other Topics Concern    Not on file     Social History Narrative    No narrative on file       ALLERGIES AND MEDICATIONS: updated and reviewed.  Review of patient's allergies indicates:  No Known Allergies  Current Outpatient Prescriptions   Medication Sig Dispense Refill    apixaban 2.5 mg Tab Take 1 tablet (2.5 mg total) by mouth 2 (two) times daily. (Patient taking differently: Take 10 mg by mouth 2 (two) times daily. ) 60 tablet 11    aripiprazole (ABILIFY) 10 MG Tab Take 1 tablet (10 mg total) by mouth once daily. 90 tablet 3    aspirin (ECOTRIN) 81 MG EC tablet Take 1 tablet (81 mg total) by mouth once daily.  0    atorvastatin (LIPITOR) 10 MG tablet Take 1 tablet (10 mg total) by mouth once daily. 90 tablet 3    donepezil (ARICEPT) 10 MG tablet Take 1 tablet (10 mg total) by mouth once daily. 30 tablet 11    escitalopram oxalate (LEXAPRO) 10 MG tablet Take 1 tablet (10 mg total) by mouth once daily. 90 tablet 3     "memantine (NAMENDA) 10 MG Tab Take 1 tablet (10 mg total) by mouth 2 (two) times daily. 180 tablet 3    metoprolol succinate (TOPROL-XL) 50 MG 24 hr tablet Take 1 tablet (50 mg total) by mouth once daily. 30 tablet 11    mirabegron (MYRBETRIQ) 50 mg Tb24 Take 1 tablet (50 mg total) by mouth once daily. 30 tablet 11    ondansetron (ZOFRAN-ODT) 4 MG TbDL Take 1 tablet (4 mg total) by mouth every 12 (twelve) hours as needed. 60 tablet 5    trazodone (DESYREL) 50 MG tablet Start with 1/2 tablet near bedtime; up to 2 tablets at a time if needed 30 tablet 11    zinc oxide 10 % Crea Apply to affected area daily to twice a day 50 g 0     No current facility-administered medications for this visit.        Review of Systems   Unable to perform ROS: Dementia       Physical Exam   Vitals:    09/13/17 1031   BP: (!) 148/84   Pulse: 78   Temp: 97.9 °F (36.6 °C)   Weight: 78.5 kg (173 lb)   Height: 5' 3.5" (1.613 m)    Body mass index is 30.16 kg/m².  Weight: 78.5 kg (173 lb)   Height: 5' 3.5" (161.3 cm)     Physical Exam   Constitutional: She appears well-developed and well-nourished. No distress.   HENT:   No maxillary or frontal sinus tenderness   Eyes: EOM are normal.   Cardiovascular: Normal rate, regular rhythm and normal heart sounds.    No murmur heard.  Pulmonary/Chest: Effort normal and breath sounds normal.   Abdominal: Soft. There is no tenderness. There is no guarding.   Musculoskeletal: Normal range of motion.   Neurological: She is alert. Coordination normal.   Skin: Skin is warm and dry.   Psychiatric: She has a normal mood and affect.   Quiet, appropriate response to questions     "

## 2017-09-14 ENCOUNTER — TELEPHONE (OUTPATIENT)
Dept: FAMILY MEDICINE | Facility: CLINIC | Age: 82
End: 2017-09-14

## 2017-09-14 ENCOUNTER — LAB VISIT (OUTPATIENT)
Dept: LAB | Facility: HOSPITAL | Age: 82
End: 2017-09-14
Attending: INTERNAL MEDICINE
Payer: COMMERCIAL

## 2017-09-14 DIAGNOSIS — N30.90 CYSTITIS: Primary | ICD-10-CM

## 2017-09-14 DIAGNOSIS — R31.9 HEMATURIA: ICD-10-CM

## 2017-09-14 LAB
BACTERIA #/AREA URNS HPF: ABNORMAL /HPF
BILIRUB UR QL STRIP: NEGATIVE
CLARITY UR: ABNORMAL
COLOR UR: YELLOW
GLUCOSE UR QL STRIP: NEGATIVE
HGB UR QL STRIP: ABNORMAL
KETONES UR QL STRIP: NEGATIVE
LEUKOCYTE ESTERASE UR QL STRIP: ABNORMAL
MICROSCOPIC COMMENT: ABNORMAL
NITRITE UR QL STRIP: NEGATIVE
PH UR STRIP: 5 [PH] (ref 5–8)
PROT UR QL STRIP: NEGATIVE
RBC #/AREA URNS HPF: 2 /HPF (ref 0–4)
SP GR UR STRIP: 1.02 (ref 1–1.03)
SQUAMOUS #/AREA URNS HPF: 3 /HPF
URN SPEC COLLECT METH UR: ABNORMAL
UROBILINOGEN UR STRIP-ACNC: NEGATIVE EU/DL
WBC #/AREA URNS HPF: 15 /HPF (ref 0–5)

## 2017-09-14 PROCEDURE — 87086 URINE CULTURE/COLONY COUNT: CPT

## 2017-09-14 PROCEDURE — 81000 URINALYSIS NONAUTO W/SCOPE: CPT

## 2017-09-14 RX ORDER — SULFAMETHOXAZOLE AND TRIMETHOPRIM 800; 160 MG/1; MG/1
1 TABLET ORAL 2 TIMES DAILY
Qty: 14 TABLET | Refills: 0 | Status: SHIPPED | OUTPATIENT
Start: 2017-09-14 | End: 2017-09-21

## 2017-09-14 NOTE — TELEPHONE ENCOUNTER
----- Message from Negra Desai sent at 9/13/2017  1:46 PM CDT -----  Contact: Self  Pt calling regarding meds that was suppose be sent to pharmacy. Please call [phone].

## 2017-09-14 NOTE — TELEPHONE ENCOUNTER
Patient states her prescription for UTI was not at the pharmacy. She states she discussed with Dr. Mejia on her office visit on 09/13/17. Please Advise

## 2017-09-16 LAB — BACTERIA UR CULT: NO GROWTH

## 2017-09-18 ENCOUNTER — TELEPHONE (OUTPATIENT)
Dept: FAMILY MEDICINE | Facility: CLINIC | Age: 82
End: 2017-09-18

## 2017-09-18 NOTE — PROGRESS NOTES
UA positive heme but RBC 2.  WBC present.  UCx negative.  Started on empiric treatment.  Continue abx.

## 2017-09-18 NOTE — TELEPHONE ENCOUNTER
I notified the patient/ daughter, and advised the patient/daughter of Dr Mejia's recommendations. The patient verbalized understanding of the provider's recommendations.

## 2017-10-11 DIAGNOSIS — I50.22 CHRONIC SYSTOLIC CONGESTIVE HEART FAILURE: Chronic | ICD-10-CM

## 2017-10-11 DIAGNOSIS — F02.80 ALZHEIMER'S DEMENTIA WITHOUT BEHAVIORAL DISTURBANCE, UNSPECIFIED TIMING OF DEMENTIA ONSET: Primary | Chronic | ICD-10-CM

## 2017-10-11 DIAGNOSIS — G30.9 ALZHEIMER'S DEMENTIA WITHOUT BEHAVIORAL DISTURBANCE, UNSPECIFIED TIMING OF DEMENTIA ONSET: Primary | Chronic | ICD-10-CM

## 2017-10-11 NOTE — PROGRESS NOTES
Caretaker has  and pt's other daughter is considering nursing home placement requesting assistance.  Will submit request to case mgmt.

## 2017-10-18 ENCOUNTER — OUTPATIENT CASE MANAGEMENT (OUTPATIENT)
Dept: ADMINISTRATIVE | Facility: OTHER | Age: 82
End: 2017-10-18

## 2017-10-18 ENCOUNTER — OFFICE VISIT (OUTPATIENT)
Dept: CARDIOLOGY | Facility: CLINIC | Age: 82
End: 2017-10-18
Payer: MEDICARE

## 2017-10-18 VITALS
BODY MASS INDEX: 29.96 KG/M2 | OXYGEN SATURATION: 95 % | WEIGHT: 175.5 LBS | HEIGHT: 64 IN | HEART RATE: 106 BPM | DIASTOLIC BLOOD PRESSURE: 57 MMHG | SYSTOLIC BLOOD PRESSURE: 115 MMHG

## 2017-10-18 DIAGNOSIS — I50.23 ACUTE ON CHRONIC SYSTOLIC CONGESTIVE HEART FAILURE: ICD-10-CM

## 2017-10-18 DIAGNOSIS — I82.432 ACUTE DEEP VEIN THROMBOSIS (DVT) OF POPLITEAL VEIN OF LEFT LOWER EXTREMITY: ICD-10-CM

## 2017-10-18 DIAGNOSIS — F02.80 ALZHEIMER'S DEMENTIA WITHOUT BEHAVIORAL DISTURBANCE, UNSPECIFIED TIMING OF DEMENTIA ONSET: Primary | Chronic | ICD-10-CM

## 2017-10-18 DIAGNOSIS — G30.9 ALZHEIMER'S DEMENTIA WITHOUT BEHAVIORAL DISTURBANCE, UNSPECIFIED TIMING OF DEMENTIA ONSET: Primary | Chronic | ICD-10-CM

## 2017-10-18 DIAGNOSIS — I10 HTN (HYPERTENSION): ICD-10-CM

## 2017-10-18 DIAGNOSIS — I50.22 CHRONIC SYSTOLIC CONGESTIVE HEART FAILURE: Chronic | ICD-10-CM

## 2017-10-18 DIAGNOSIS — I48.0 PAROXYSMAL ATRIAL FIBRILLATION: Chronic | ICD-10-CM

## 2017-10-18 PROCEDURE — 93000 ELECTROCARDIOGRAM COMPLETE: CPT | Mod: S$GLB,,, | Performed by: INTERNAL MEDICINE

## 2017-10-18 PROCEDURE — 99214 OFFICE O/P EST MOD 30 MIN: CPT | Mod: PBBFAC,25 | Performed by: INTERNAL MEDICINE

## 2017-10-18 PROCEDURE — 99214 OFFICE O/P EST MOD 30 MIN: CPT | Mod: S$GLB,,, | Performed by: INTERNAL MEDICINE

## 2017-10-18 PROCEDURE — 93005 ELECTROCARDIOGRAM TRACING: CPT | Mod: PBBFAC | Performed by: INTERNAL MEDICINE

## 2017-10-18 PROCEDURE — 99999 PR PBB SHADOW E&M-EST. PATIENT-LVL IV: CPT | Mod: PBBFAC,,, | Performed by: INTERNAL MEDICINE

## 2017-10-18 RX ORDER — DOXYCYCLINE 100 MG/1
100 CAPSULE ORAL EVERY 12 HOURS
Qty: 14 CAPSULE | Refills: 0 | Status: SHIPPED | OUTPATIENT
Start: 2017-10-18

## 2017-10-18 NOTE — PROGRESS NOTES
Subjective:    Patient ID:  Boubacar Chen is a 83 y.o. female who presents for follow-up of Atrial Fibrillation      HPI     Dementia, cardiomyopathy - EF 20% ( improved to 55% by echo 7/5/17),  HTN, paroxysmal atrial fibrillation on eliquis, left common femoral, popliteal embolus now s/p LLE thrombectomy and faciotomy 2/14/17     Referred by Dr Mejia  Previously followed by Dr Ness     Followed by vascular surgery at [facility]  Patient is a 82 y.o. female with dementia, HTN, paroxysmal atrial fibrillation with left common femoral, popliteal embolus now s/p LLE thrombectomy and faciotomy who is here today for evaluation of fasciotomy wound.  Pt's daughters are here and report Home Health is visiting for wound care and PT ([facility]: [phone]).  The patient's wound vac just arrived and the Home Health agency is requesting wound vac orders.       Echo 2/15/17    1 - Eccentric hypertrophy.     2 - Severely depressed left ventricular systolic function (EF 20-25%).     3 - Moderate left atrial enlargement.     4 - Moderately depressed right ventricular systolic function .     5 - Mild mitral regurgitation.     6 - Intermediate central venous pressure.      Echo 7/5/17    1 - Normal left ventricular systolic function (EF 55-60%).     2 - No wall motion abnormalities.     3 - Eccentric hypertrophy.     4 - Mildly depressed right ventricular systolic function .     5 - Mild MV prolapse with mild to moderate mitral regurgitation.     6 - Mild tricuspid regurgitation.     7 - The estimated PA systolic pressure is 31 mmHg.      Dementia is advanced - she was living in a NH - now with her daughter but looking for [facility] again    EKG sinus tachycardia 108 otherwise ok  Suffering with an URI  Denies CP or SOB         Review of Systems   Constitution: Negative for decreased appetite.   HENT: Negative for ear discharge.    Eyes: Negative for blurred vision.   Respiratory: Negative for hemoptysis.    Endocrine:  Negative for polyphagia.   Hematologic/Lymphatic: Negative for adenopathy.   Skin: Negative for color change.   Musculoskeletal: Negative for joint swelling.   Neurological: Negative for brief paralysis.   Psychiatric/Behavioral: Negative for hallucinations.        Objective:    Physical Exam   Constitutional: She is oriented to person, place, and time. She appears well-developed and well-nourished.   HENT:   Head: Normocephalic and atraumatic.   Eyes: Conjunctivae are normal. Pupils are equal, round, and reactive to light.   Neck: Normal range of motion. Neck supple.   Cardiovascular: Normal rate, normal heart sounds and intact distal pulses.    Pulmonary/Chest: Effort normal and breath sounds normal.   Abdominal: Soft. Bowel sounds are normal.   Musculoskeletal: Normal range of motion.   Neurological: She is alert and oriented to person, place, and time.   Skin: Skin is warm and dry.         Assessment:       1. Alzheimer's dementia without behavioral disturbance, unspecified timing of dementia onset    2. Paroxysmal atrial fibrillation    3. Chronic systolic congestive heart failure    4. Acute on chronic systolic congestive heart failure    5. Acute deep vein thrombosis (DVT) of popliteal vein of left lower extremity         Plan:       Doxycycline for URI  OV 6 months

## 2017-10-18 NOTE — PROGRESS NOTES
Please note the following patients information has been forwarded to [facility] for Case Management or .    Please see the media section in patient's chart for additional details.    Please contact Outpatient Complex care Management at ext [phone] with any questions.    Thank you,    Mindy Saenz, SSC

## 2017-10-20 ENCOUNTER — TELEPHONE (OUTPATIENT)
Dept: FAMILY MEDICINE | Facility: CLINIC | Age: 82
End: 2017-10-20

## 2017-10-20 NOTE — TELEPHONE ENCOUNTER
----- Message from Robina Salguero sent at 10/19/2017 12:20 PM CDT -----  Contact: daughter - Tali   Patient's daughter is requesting to speak to Dr Mejia directly prior to her mother's appt next week .  [phone]      LL

## 2017-10-24 ENCOUNTER — OFFICE VISIT (OUTPATIENT)
Dept: FAMILY MEDICINE | Facility: CLINIC | Age: 82
End: 2017-10-24
Payer: COMMERCIAL

## 2017-10-24 VITALS
HEIGHT: 64 IN | OXYGEN SATURATION: 98 % | TEMPERATURE: 98 F | BODY MASS INDEX: 30.11 KG/M2 | SYSTOLIC BLOOD PRESSURE: 114 MMHG | DIASTOLIC BLOOD PRESSURE: 70 MMHG | HEART RATE: 60 BPM | WEIGHT: 176.38 LBS

## 2017-10-24 DIAGNOSIS — F02.80 ALZHEIMER'S DEMENTIA WITHOUT BEHAVIORAL DISTURBANCE, UNSPECIFIED TIMING OF DEMENTIA ONSET: Primary | Chronic | ICD-10-CM

## 2017-10-24 DIAGNOSIS — Z11.1 SCREENING-PULMONARY TB: ICD-10-CM

## 2017-10-24 DIAGNOSIS — G30.9 ALZHEIMER'S DEMENTIA WITHOUT BEHAVIORAL DISTURBANCE, UNSPECIFIED TIMING OF DEMENTIA ONSET: Primary | Chronic | ICD-10-CM

## 2017-10-24 DIAGNOSIS — Z23 NEEDS FLU SHOT: ICD-10-CM

## 2017-10-24 PROBLEM — I50.23 ACUTE ON CHRONIC SYSTOLIC CONGESTIVE HEART FAILURE: Status: RESOLVED | Noted: 2017-06-27 | Resolved: 2017-10-24

## 2017-10-24 PROBLEM — T81.49XA SURGICAL WOUND INFECTION: Status: RESOLVED | Noted: 2017-05-03 | Resolved: 2017-10-24

## 2017-10-24 PROCEDURE — G0008 ADMIN INFLUENZA VIRUS VAC: HCPCS | Mod: S$GLB,,, | Performed by: INTERNAL MEDICINE

## 2017-10-24 PROCEDURE — 86580 TB INTRADERMAL TEST: CPT | Mod: PBBFAC,PO

## 2017-10-24 PROCEDURE — 90662 IIV NO PRSV INCREASED AG IM: CPT | Mod: S$GLB,,, | Performed by: INTERNAL MEDICINE

## 2017-10-24 PROCEDURE — 99214 OFFICE O/P EST MOD 30 MIN: CPT | Mod: 25,S$GLB,, | Performed by: INTERNAL MEDICINE

## 2017-10-24 PROCEDURE — 99213 OFFICE O/P EST LOW 20 MIN: CPT | Mod: PBBFAC,PO | Performed by: INTERNAL MEDICINE

## 2017-10-24 PROCEDURE — 99999 PR PBB SHADOW E&M-EST. PATIENT-LVL III: CPT | Mod: PBBFAC,,, | Performed by: INTERNAL MEDICINE

## 2017-10-24 PROCEDURE — G0008 ADMIN INFLUENZA VIRUS VAC: HCPCS | Mod: PBBFAC,PO

## 2017-10-24 PROCEDURE — 86580 TB INTRADERMAL TEST: CPT | Mod: S$GLB,,, | Performed by: INTERNAL MEDICINE

## 2017-10-24 RX ORDER — QUETIAPINE FUMARATE 25 MG/1
25 TABLET, FILM COATED ORAL NIGHTLY
Qty: 30 TABLET | Refills: 5 | Status: SHIPPED | OUTPATIENT
Start: 2017-10-24 | End: 2017-11-07 | Stop reason: SDUPTHER

## 2017-10-24 NOTE — PROGRESS NOTES
Influenza and PPD vaccine administered, siomara well. Instructed to wait 15mins for observation, no reaction noted @ time of discharge.instructed to have read within 48-72 hours. Verbalized understanding.

## 2017-10-24 NOTE — PATIENT INSTRUCTIONS
IF YOU FEEL TOO MUCH MEDS - CAN STOP THE MEDS FOR ALZHEIMERS = DONEPAZIL, THE MEMANTINE.  KEEP THE ABILIFY.    KEEP THE LEXAPRO AND KEEP THE ABILIFY.    MAY TRY SEROQUEL INSTEAD OF TRAZODONE AND INSTEAD OF REMERON.  MAY TAKE 25 TO 50 MG OF SEROQUEL DAILY.

## 2017-10-24 NOTE — PROGRESS NOTES
This note was created by combination of typed  and Dragon dictation.  Transcription errors may be present.  If there are any questions, please contact me.    Assessment & Plan  Alzheimer's dementia without behavioral disturbance, unspecified timing of dementia onset - daughter is working on nursing home placement but finances are an issue currently.  We'll go ahead and place PPD today.  If she finds a place that has availability and they have lab or x-ray requirements she can schedule a lab visit.  In regards to medications, polypharmacy.  The patient has not had an issues with taking pills.  4.  Issues with sleep, daughter is aware of the need to try and keep her occupied and avoid napping during the day but being caretaker for the patient is extremity difficult.  Trazodone and Remeron didn't really help.  I sent in a prescription for Seroquel.  She is on the fence about whether to start the patient on this.  If she does, she can stop the Remeron and stop the trazodone.  Really as she finds neither are of benefit she can stop the Remeron and trazodone now regardless of whether she starts seroquel.  She can also stop the memantine and donepazil if too many meds.  Stay on lexapro and abilify.  -     QUEtiapine (SEROQUEL) 25 MG Tab; Take 1 tablet (25 mg total) by mouth every evening.  Dispense: 30 tablet; Refill: 5    Screening-pulmonary TB  -     POCT TB Skin Test Read    Needs flu shot  -     Influenza - High Dose (65+) (PF) (IM)        Medications Discontinued During This Encounter   Medication Reason    mirtazapine (REMERON) 15 MG tablet     trazodone (DESYREL) 50 MG tablet Therapy not effective       Follow-up: No Follow-up on file.      =================================================================      Chief Complaint   Patient presents with    trying to get into living center     ppd and flu shot       HPI  Boubacar is a 83 y.o. female, last appointment with this clinic was 9/13/2017.    No LMP  recorded. Patient has had a hysterectomy.    Daughter Leighann who is caretaker  of drug overdose.  So now with other daughter Tali.  This daughter works too long hours and so they need nursing home placement.      [facility] facility has availability but too expensive up front.  [facility] no current openings.  [facility] has a bed but needs financial statements but pt's  is not going to be forthcoming.  Bank statements, stubs, etc.  [facility] just needs a PPD.  [facility]'s wants other things like EKG, CXR, labs.      Turns out Leighann was not giving the pt meds as prescribed.  Trazodone without efficacy even at 3 tablets.  Remeron wasn't completely effective either.  Patient sleeps on the couch in front of the TV a lot which results in nighttime agitation and energy.  The patient is a wandering risk, family has had to barricade the front door but she will try to get out anyway.      Caretaker notes that pt doesn't always seem to recognize her.  Pt nonverbal now for the most part.    Patient Care Team:  Fabian Mejia MD as PCP - General (Internal Medicine)  Ed Ness MD as Consulting Physician (Cardiology)  Neal Bond MD as Consulting Physician (Neurology)  Rigo Bernard MD as Consulting Physician (Vascular Surgery)    Patient Active Problem List    Diagnosis Date Noted    Acute on chronic systolic congestive heart failure 2017    Critical lower limb ischemia     Surgical wound infection 2017    Disruption of external operation (surgical) wound 2017    Abnormal granulation tissue 2017    Acute deep vein thrombosis (DVT) of popliteal vein of left lower extremity 2017 LE US Deep venous thrombosis left popliteal vein nearly completely occlusive; Left popliteal cyst      CKD (chronic kidney disease) stage 3, GFR 30-59 ml/min 2017 renal US: Right renal cysts.  Elevated resistive indices suggesting intrinsic renal disease.  No  hydronephrosis.  Urinary bladder empty at the time of the exam.      Dementia     CHF (congestive heart failure) 02/15/2017     2/15/2017 TTE: CONCLUSIONS 1 - Eccentric hypertrophy. 2 - Severely depressed left ventricular systolic function (EF 20-25%). 3 - Moderate left atrial enlargement. 4 - Moderately depressed right ventricular systolic function . 5 - Mild mitral regurgitation. 6 - Intermediate central venous pressure.       Paroxysmal atrial fibrillation     Odontoid fracture with routine healing 11/19/2016 11/19/2016 CT C spine: Incompletely healed chronic appearing type II dens fracture which is status post ORIF as as discussed above. No definite new additional fracture is identified in the prior studies are available for comparison. There severe cervical spondylosis.  Incidental findings also include vascular calcification and a heterogeneous goiter      Epigastric abdominal pain 12/30/2015     EGD 12/30/2015 biopsies negative.  Hiatal hernia.      Diarrhea 11/03/2014 1/6/2015 colonoscopy internal hemorrhoids.      Lower urinary tract infectious disease 10/07/2013       PAST MEDICAL HISTORY:  Past Medical History:   Diagnosis Date    Anxiety     Arthritis     Dementia     Former smoker     Hyperlipidemia     Hypertension     Stroke     at age 58       PAST SURGICAL HISTORY:  Past Surgical History:   Procedure Laterality Date    ANTERIOR VAGINAL REPAIR  1960's    COLONOSCOPY  2008    COLONOSCOPY N/A 1/5/2016    Procedure: COLONOSCOPY;  Surgeon: Daniel Alicea MD;  Location: [facility];  Service: Endoscopy;  Laterality: N/A;    HYSTERECTOMY      total    JOINT REPLACEMENT      bilateral hip    UPPER GASTROINTESTINAL ENDOSCOPY  2008     h. pylori       SOCIAL HISTORY:  Social History     Social History    Marital status:      Spouse name: N/A    Number of children: N/A    Years of education: N/A     Occupational History    Not on file.     Social History Main Topics     Smoking status: Former Smoker     Packs/day: 1.00     Years: 25.00     Types: Cigarettes     Quit date: 10/7/1998    Smokeless tobacco: Never Used    Alcohol use No      Comment: occ    Drug use: No    Sexual activity: No     Other Topics Concern    Not on file     Social History Narrative    No narrative on file       ALLERGIES AND MEDICATIONS: updated and reviewed.  Review of patient's allergies indicates:  No Known Allergies  Current Outpatient Prescriptions   Medication Sig Dispense Refill    apixaban 2.5 mg Tab Take 1 tablet (2.5 mg total) by mouth 2 (two) times daily. (Patient taking differently: Take 10 mg by mouth 2 (two) times daily. ) 60 tablet 11    aripiprazole (ABILIFY) 10 MG Tab Take 1 tablet (10 mg total) by mouth once daily. 90 tablet 3    aspirin (ECOTRIN) 81 MG EC tablet Take 1 tablet (81 mg total) by mouth once daily.  0    atorvastatin (LIPITOR) 10 MG tablet Take 1 tablet (10 mg total) by mouth once daily. 90 tablet 3    donepezil (ARICEPT) 10 MG tablet Take 1 tablet (10 mg total) by mouth once daily. 30 tablet 11    doxycycline (VIBRAMYCIN) 100 MG Cap Take 1 capsule (100 mg total) by mouth every 12 (twelve) hours. 14 capsule 0    escitalopram oxalate (LEXAPRO) 10 MG tablet Take 1 tablet (10 mg total) by mouth once daily. 90 tablet 3    fluticasone (FLONASE) 50 mcg/actuation nasal spray 1 spray by Each Nare route once daily. 16 g 11    memantine (NAMENDA) 10 MG Tab Take 1 tablet (10 mg total) by mouth 2 (two) times daily. 180 tablet 3    metoprolol succinate (TOPROL-XL) 50 MG 24 hr tablet Take 1 tablet (50 mg total) by mouth once daily. 30 tablet 11    mirabegron (MYRBETRIQ) 50 mg Tb24 Take 1 tablet (50 mg total) by mouth once daily. 30 tablet 11    mirtazapine (REMERON) 15 MG tablet Take 1 tablet (15 mg total) by mouth every evening. 30 tablet 11    ondansetron (ZOFRAN-ODT) 4 MG TbDL Take 1 tablet (4 mg total) by mouth every 12 (twelve) hours as needed. 60 tablet 5     "sumatriptan (IMITREX) 50 MG tablet Take 1 tablet (50 mg total) by mouth once daily. 9 tablet 1    trazodone (DESYREL) 50 MG tablet Take 3 tablets (150 mg total) by mouth every evening. Start with 1/2 tablet near bedtime; up to 2 tablets at a time if needed 90 tablet 11    zinc oxide 10 % Crea Apply to affected area daily to twice a day 50 g 0     No current facility-administered medications for this visit.        Review of Systems   Constitutional: Negative for chills and fever.   Neurological: Positive for headaches.        Daughter notes that the patient will complain of headaches.  Thinks it's related to the history of cervical fracture and poor healing.  Has tried ibuprofen for her.  Has not tried Tylenol       Physical Exam   Vitals:    10/24/17 1304   BP: 114/70   Pulse: 60   Temp: 98.3 °F (36.8 °C)   SpO2: 98%   Weight: 80 kg (176 lb 5.9 oz)   Height: 5' 3.5" (1.613 m)    Body mass index is 30.75 kg/m².  Weight: 80 kg (176 lb 5.9 oz)   Height: 5' 3.5" (161.3 cm)     Physical Exam   Constitutional: She appears well-developed and well-nourished. No distress.   Cardiovascular: Normal rate and regular rhythm.    Pulmonary/Chest: Effort normal.   Abdominal: Soft. She exhibits no distension and no mass. There is no tenderness. There is no guarding.   Musculoskeletal: She exhibits no edema.   Torticollis of the neck, left side.   Neurological: She exhibits normal muscle tone.   Skin: No rash noted.   Psychiatric:   No eye contact.  Flat affect.  Nonverbal     "

## 2017-10-26 LAB
TB INDURATION - 48 HR READ: NORMAL MM
TB INDURATION - 72 HR READ: NORMAL MM
TB SKIN TEST - 48 HR READ: NORMAL
TB SKIN TEST - 72 HR READ: NORMAL

## 2017-11-07 ENCOUNTER — TELEPHONE (OUTPATIENT)
Dept: VASCULAR SURGERY | Facility: CLINIC | Age: 82
End: 2017-11-07

## 2017-11-07 ENCOUNTER — TELEPHONE (OUTPATIENT)
Dept: FAMILY MEDICINE | Facility: CLINIC | Age: 82
End: 2017-11-07

## 2017-11-07 DIAGNOSIS — G30.9 ALZHEIMER'S DEMENTIA WITHOUT BEHAVIORAL DISTURBANCE, UNSPECIFIED TIMING OF DEMENTIA ONSET: ICD-10-CM

## 2017-11-07 DIAGNOSIS — F02.80 ALZHEIMER'S DEMENTIA WITHOUT BEHAVIORAL DISTURBANCE, UNSPECIFIED TIMING OF DEMENTIA ONSET: ICD-10-CM

## 2017-11-07 DIAGNOSIS — I48.0 PAROXYSMAL ATRIAL FIBRILLATION: Chronic | ICD-10-CM

## 2017-11-07 DIAGNOSIS — I82.439: Primary | ICD-10-CM

## 2017-11-07 DIAGNOSIS — I82.432 ACUTE DEEP VEIN THROMBOSIS (DVT) OF POPLITEAL VEIN OF LEFT LOWER EXTREMITY: ICD-10-CM

## 2017-11-07 RX ORDER — ESCITALOPRAM OXALATE 10 MG/1
10 TABLET ORAL DAILY
Qty: 90 TABLET | Refills: 3 | Status: SHIPPED | OUTPATIENT
Start: 2017-11-07

## 2017-11-07 RX ORDER — SPIRONOLACTONE 25 MG/1
25 TABLET ORAL DAILY
Qty: 90 TABLET | Refills: 0 | Status: SHIPPED | OUTPATIENT
Start: 2017-11-07 | End: 2018-03-18 | Stop reason: SDUPTHER

## 2017-11-07 RX ORDER — LISINOPRIL 2.5 MG/1
2.5 TABLET ORAL DAILY
Qty: 90 TABLET | Refills: 3 | COMMUNITY
Start: 2017-11-07 | End: 2017-11-07 | Stop reason: SDUPTHER

## 2017-11-07 RX ORDER — SPIRONOLACTONE 25 MG/1
25 TABLET ORAL DAILY
Qty: 90 TABLET | Refills: 0 | COMMUNITY
Start: 2017-11-07 | End: 2017-11-07 | Stop reason: SDUPTHER

## 2017-11-07 RX ORDER — ARIPIPRAZOLE 10 MG/1
10 TABLET ORAL DAILY
Qty: 90 TABLET | Refills: 3 | Status: SHIPPED | OUTPATIENT
Start: 2017-11-07

## 2017-11-07 RX ORDER — ATORVASTATIN CALCIUM 10 MG/1
10 TABLET, FILM COATED ORAL DAILY
Qty: 90 TABLET | Refills: 3 | Status: SHIPPED | OUTPATIENT
Start: 2017-11-07 | End: 2018-03-27 | Stop reason: SDUPTHER

## 2017-11-07 RX ORDER — ASPIRIN 81 MG/1
81 TABLET ORAL DAILY
Qty: 90 TABLET | Refills: 3 | Status: SHIPPED | OUTPATIENT
Start: 2017-11-07 | End: 2018-11-07

## 2017-11-07 RX ORDER — METOPROLOL SUCCINATE 50 MG/1
50 TABLET, EXTENDED RELEASE ORAL DAILY
Qty: 30 TABLET | Refills: 11 | Status: SHIPPED | OUTPATIENT
Start: 2017-11-07 | End: 2018-03-27 | Stop reason: SDUPTHER

## 2017-11-07 RX ORDER — QUETIAPINE FUMARATE 25 MG/1
25 TABLET, FILM COATED ORAL NIGHTLY
Qty: 30 TABLET | Refills: 5 | Status: SHIPPED | OUTPATIENT
Start: 2017-11-07 | End: 2018-11-07

## 2017-11-07 RX ORDER — LISINOPRIL 2.5 MG/1
2.5 TABLET ORAL DAILY
Qty: 90 TABLET | Refills: 3 | Status: SHIPPED | OUTPATIENT
Start: 2017-11-07 | End: 2018-11-07

## 2017-11-07 NOTE — TELEPHONE ENCOUNTER
----- Message from Ketty Cordero sent at 11/7/2017  8:46 AM CST -----  Contact: Daughter - Tali  Patient's daughter called to check on the status of her request to get all the information she need to get patient in a nursing facility. Please call at  [phone]

## 2017-11-07 NOTE — TELEPHONE ENCOUNTER
----- Message from Estephania Cordero sent at 11/6/2017 11:48 AM CST -----  Contact: Tali Woodson-daughter  Patient had a clot removed from her left foot by Marco Antonio in February 2017. She has been complaining of that foot burning lately. Daughter would like to bring her in to see Marco Antonio to get foot looked at. Please call back.

## 2017-11-07 NOTE — TELEPHONE ENCOUNTER
Needs a hard copy of all meds for assisted living.  Daughter will  hard copy and all her other test when available.    She stated she has been on aldactone and lisinopril but both have been taking off her medcard.

## 2017-11-08 ENCOUNTER — TELEPHONE (OUTPATIENT)
Dept: FAMILY MEDICINE | Facility: CLINIC | Age: 82
End: 2017-11-08

## 2017-11-08 NOTE — TELEPHONE ENCOUNTER
----- Message from Holley Reddy sent at 11/8/2017  1:26 PM CST -----  Contact: Tali (daughter )[phone]  Refills :Dyazide ,sumatriptan (IMITREX) 50 MG tablet,ondansetron (ZOFRAN-ODT) 4 MG TbDL pt wants a hard copy of these Thanks !

## 2017-11-08 NOTE — TELEPHONE ENCOUNTER
As per other message... All of her rx's  Have been printed along with all her paperwork for nursing home.

## 2017-11-09 DIAGNOSIS — I50.22 CHRONIC SYSTOLIC CONGESTIVE HEART FAILURE: ICD-10-CM

## 2017-11-09 DIAGNOSIS — G43.909 MIGRAINE WITHOUT STATUS MIGRAINOSUS, NOT INTRACTABLE, UNSPECIFIED MIGRAINE TYPE: ICD-10-CM

## 2017-11-09 DIAGNOSIS — R11.0 NAUSEA: ICD-10-CM

## 2017-11-09 RX ORDER — TRIAMTERENE AND HYDROCHLOROTHIAZIDE 37.5; 25 MG/1; MG/1
1 CAPSULE ORAL EVERY MORNING
Qty: 90 CAPSULE | Refills: 3 | COMMUNITY
Start: 2017-11-09 | End: 2017-11-09 | Stop reason: SDUPTHER

## 2017-11-09 RX ORDER — ONDANSETRON 4 MG/1
4 TABLET, ORALLY DISINTEGRATING ORAL EVERY 12 HOURS PRN
Qty: 60 TABLET | Refills: 5 | Status: SHIPPED | OUTPATIENT
Start: 2017-11-09

## 2017-11-09 RX ORDER — TRIAMTERENE AND HYDROCHLOROTHIAZIDE 37.5; 25 MG/1; MG/1
1 CAPSULE ORAL EVERY MORNING
Qty: 90 CAPSULE | Refills: 3 | Status: SHIPPED | OUTPATIENT
Start: 2017-11-09 | End: 2018-03-27 | Stop reason: SDUPTHER

## 2017-11-09 RX ORDER — SUMATRIPTAN 50 MG/1
50 TABLET, FILM COATED ORAL DAILY
Qty: 9 TABLET | Refills: 3 | Status: SHIPPED | OUTPATIENT
Start: 2017-11-09 | End: 2017-12-09

## 2017-11-22 ENCOUNTER — HOSPITAL ENCOUNTER (OUTPATIENT)
Dept: VASCULAR SURGERY | Facility: CLINIC | Age: 82
Discharge: HOME | End: 2017-11-22
Attending: SURGERY
Payer: COMMERCIAL

## 2017-11-22 ENCOUNTER — OFFICE VISIT (OUTPATIENT)
Dept: VASCULAR SURGERY | Facility: CLINIC | Age: 82
End: 2017-11-22
Payer: COMMERCIAL

## 2017-11-22 VITALS
SYSTOLIC BLOOD PRESSURE: 78 MMHG | WEIGHT: 171 LBS | HEART RATE: 91 BPM | TEMPERATURE: 98 F | DIASTOLIC BLOOD PRESSURE: 48 MMHG | BODY MASS INDEX: 28.49 KG/M2 | HEIGHT: 65 IN

## 2017-11-22 DIAGNOSIS — I73.9 PAD (PERIPHERAL ARTERY DISEASE): ICD-10-CM

## 2017-11-22 DIAGNOSIS — I82.439: ICD-10-CM

## 2017-11-22 DIAGNOSIS — I82.432 ACUTE DEEP VEIN THROMBOSIS (DVT) OF POPLITEAL VEIN OF LEFT LOWER EXTREMITY: Primary | ICD-10-CM

## 2017-11-22 DIAGNOSIS — M79.606 LEG PAIN: ICD-10-CM

## 2017-11-22 PROCEDURE — 93925 LOWER EXTREMITY STUDY: CPT | Mod: PBBFAC | Performed by: SURGERY

## 2017-11-22 PROCEDURE — 93925 LOWER EXTREMITY STUDY: CPT | Mod: 26,S$PBB,, | Performed by: SURGERY

## 2017-11-22 PROCEDURE — 99999 PR PBB SHADOW E&M-EST. PATIENT-LVL IV: CPT | Mod: PBBFAC,,, | Performed by: SURGERY

## 2017-11-22 PROCEDURE — 93970 EXTREMITY STUDY: CPT | Mod: PBBFAC | Performed by: SURGERY

## 2017-11-22 PROCEDURE — 99214 OFFICE O/P EST MOD 30 MIN: CPT | Mod: S$GLB,,, | Performed by: SURGERY

## 2017-11-22 PROCEDURE — 93970 EXTREMITY STUDY: CPT | Mod: S$GLB,,, | Performed by: SURGERY

## 2017-11-22 PROCEDURE — 99214 OFFICE O/P EST MOD 30 MIN: CPT | Mod: PBBFAC | Performed by: SURGERY

## 2018-01-03 NOTE — PROGRESS NOTES
Subjective:    Patient ID:  Boubacar Chen is a 82 y.o. female who presents for evaluation of Establish Care      HPI   Dementia, cardiomyopathy - EF 20%,  HTN, paroxysmal atrial fibrillation on eliquis, left common femoral, popliteal embolus now s/p LLE thrombectomy and faciotomy 2/14/17    Referred by Dr Mejia  Previously followed by Dr Ness    Followed by vascular surgery at [facility]  Patient is a 82 y.o. female with dementia, HTN, paroxysmal atrial fibrillation with left common femoral, popliteal embolus now s/p LLE thrombectomy and faciotomy who is here today for evaluation of fasciotomy wound.  Pt's daughters are here and report Home Health is visiting for wound care and PT ([facility]: [phone]).  The patient's wound vac just arrived and the Home Health agency is requesting wound vac orders.      Echo 2/15/17    1 - Eccentric hypertrophy.     2 - Severely depressed left ventricular systolic function (EF 20-25%).     3 - Moderate left atrial enlargement.     4 - Moderately depressed right ventricular systolic function .     5 - Mild mitral regurgitation.     6 - Intermediate central venous pressure.     Dementia is advanced - she was living in a NH - now with her daughter  CHAUDHRY has worsened recently  BP runs low  EKG NSR - ok    Review of Systems   Constitution: Negative for decreased appetite.   HENT: Negative for ear discharge.    Eyes: Negative for blurred vision.   Respiratory: Negative for hemoptysis.    Endocrine: Negative for polyphagia.   Hematologic/Lymphatic: Negative for adenopathy.   Skin: Negative for color change.   Musculoskeletal: Negative for joint swelling.   Neurological: Negative for brief paralysis.   Psychiatric/Behavioral: Negative for hallucinations.        Objective:    Physical Exam   Constitutional: She is oriented to person, place, and time. She appears well-developed and well-nourished.   HENT:   Head: Normocephalic and atraumatic.   Eyes: Conjunctivae are normal.  Pupils are equal, round, and reactive to light.   Neck: Normal range of motion. Neck supple.   Cardiovascular: Normal rate, normal heart sounds and intact distal pulses.    Pulmonary/Chest: Effort normal and breath sounds normal.   Abdominal: Soft. Bowel sounds are normal.   Musculoskeletal: Normal range of motion.   Neurological: She is alert and oriented to person, place, and time.   Skin: Skin is warm and dry.         Assessment:       1. Alzheimer's dementia without behavioral disturbance, unspecified timing of dementia onset    2. Paroxysmal atrial fibrillation    3. Chronic systolic congestive heart failure    4. Acute deep vein thrombosis (DVT) of popliteal vein of left lower extremity    5. CKD (chronic kidney disease) stage 3, GFR 30-59 ml/min         Plan:       Will hold dyazide for hypotension  Repeat echo, BNP, BMP for worsening CHAUDHRY  Poor AICD candidate with underlying dementia             Detail Level: Simple Detail Level: Zone

## 2018-01-11 ENCOUNTER — OFFICE VISIT (OUTPATIENT)
Dept: FAMILY MEDICINE | Facility: CLINIC | Age: 83
End: 2018-01-11
Payer: COMMERCIAL

## 2018-01-11 ENCOUNTER — HOSPITAL ENCOUNTER (OUTPATIENT)
Dept: RADIOLOGY | Facility: HOSPITAL | Age: 83
Discharge: HOME | End: 2018-01-11
Attending: FAMILY MEDICINE
Payer: COMMERCIAL

## 2018-01-11 ENCOUNTER — TELEPHONE (OUTPATIENT)
Dept: FAMILY MEDICINE | Facility: CLINIC | Age: 83
End: 2018-01-11

## 2018-01-11 VITALS
WEIGHT: 157.19 LBS | OXYGEN SATURATION: 97 % | TEMPERATURE: 98 F | BODY MASS INDEX: 26.19 KG/M2 | SYSTOLIC BLOOD PRESSURE: 118 MMHG | DIASTOLIC BLOOD PRESSURE: 60 MMHG | HEART RATE: 97 BPM | HEIGHT: 65 IN

## 2018-01-11 DIAGNOSIS — R05.9 COUGHING: Primary | ICD-10-CM

## 2018-01-11 DIAGNOSIS — R09.82 PND (POST-NASAL DRIP): ICD-10-CM

## 2018-01-11 DIAGNOSIS — R09.81 NASAL CONGESTION: ICD-10-CM

## 2018-01-11 DIAGNOSIS — R05.9 COUGHING: ICD-10-CM

## 2018-01-11 DIAGNOSIS — I70.0 ATHEROSCLEROSIS OF AORTA: ICD-10-CM

## 2018-01-11 PROCEDURE — 99213 OFFICE O/P EST LOW 20 MIN: CPT | Mod: PBBFAC,PO | Performed by: FAMILY MEDICINE

## 2018-01-11 PROCEDURE — 99213 OFFICE O/P EST LOW 20 MIN: CPT | Mod: S$GLB,,, | Performed by: FAMILY MEDICINE

## 2018-01-11 PROCEDURE — 71046 X-RAY EXAM CHEST 2 VIEWS: CPT | Mod: 26,,, | Performed by: RADIOLOGY

## 2018-01-11 PROCEDURE — 99999 PR PBB SHADOW E&M-EST. PATIENT-LVL III: CPT | Mod: PBBFAC,,, | Performed by: FAMILY MEDICINE

## 2018-01-11 PROCEDURE — 71046 X-RAY EXAM CHEST 2 VIEWS: CPT | Mod: TC,PO,FY

## 2018-01-11 RX ORDER — PROMETHAZINE HYDROCHLORIDE AND DEXTROMETHORPHAN HYDROBROMIDE 6.25; 15 MG/5ML; MG/5ML
5 SYRUP ORAL 3 TIMES DAILY PRN
Qty: 118 ML | Refills: 1 | Status: SHIPPED | OUTPATIENT
Start: 2018-01-11

## 2018-01-11 NOTE — PROGRESS NOTES
Office Visit    Patient Name: Boubacar Chen    : 1934  MRN: [id number]      Assessment/Plan:  Boubacar Chen is a 83 y.o. female who presents today for :    Coughing  -     X-Ray Chest PA And Lateral; Future; Expected date: 2018  -     promethazine-dextromethorphan (PROMETHAZINE-DM) 6.25-15 mg/5 mL Syrp; Take 5 mLs by mouth 3 (three) times daily as needed (cough).  Dispense: 118 mL; Refill: 1    Nasal congestion    PND (post-nasal drip)      -Mild symptoms, most likely viral etiology  -AFVSS - advised pt's daughter on natural progression of viral URI with reassurance provided   -advised pt that cough may last up to 2-3 weeks  -advised frequent hand washing, rest, and plenty of fluids.   -     Increase your water intake to 64-80 oz daily to help thin mucus  -     Nasal Saline spray (Over the counter Lamb spray or Ayr)  2 sprays each nostril 2-3 times a day for nasal congestion  -     Tylenol every 4-6 hours as needed for fever, headaches, sore throat, ear pain, bodyaches, and/or nasal/sinus inflammation    -may call or RTC if pt develops fever, or if pain and general Sx worsens.    CXR done in clinic reviewed and no signs of consolidation.    Atherosclerosis of aorta  -continue with ASA      Follow-up for worsening Sx. Urgent care/ED precautions provided.     This note was created by combination of typed  and Dragon dictation.  Transcription errors may be present.  If there are any questions, please contact me.        ----------------------------------------------------------------------------------------------------------------------      HPI:  Boubacar is a 83 y.o. female who presents today for:    Cough and Sinus Problem        This patient has multiple medical diagnoses as noted below.     Visit date not found.    Patient is here today for evaluation of drainage and coughing that patient has had for the past week, not getting worse  +cough productive of clear phlegm  No facial  pressure and pain  Normal appetite   +sick contact with similar Sx (son in law)  Medications tried at home: OTC meds    No body aches  No ear pain  No N/V/F/C/abd pain/SOB/CP/diarrhea.          Additional ROS    No dysphagia/sore throat  No CP/SOB/palpitations/swelling  No nausea/vomiting/abd pain/no diarrhea, no constipation, blood in stool  No rashes    Patient Active Problem List   Diagnosis    Lower urinary tract infectious disease    Diarrhea    Epigastric abdominal pain    Paroxysmal atrial fibrillation    Acute deep vein thrombosis (DVT) of popliteal vein of left lower extremity    CKD (chronic kidney disease) stage 3, GFR 30-59 ml/min    CHF (congestive heart failure)    Odontoid fracture with routine healing    Dementia    Disruption of external operation (surgical) wound    Abnormal granulation tissue    Critical lower limb ischemia    Leg pain    PAD (peripheral artery disease)       Current Medications  Medications reviewed and updated.       Current Outpatient Prescriptions:     apixaban 2.5 mg Tab, Take 1 tablet (2.5 mg total) by mouth 2 (two) times daily., Disp: 60 tablet, Rfl: 11    ARIPiprazole (ABILIFY) 10 MG Tab, Take 1 tablet (10 mg total) by mouth once daily., Disp: 90 tablet, Rfl: 3    aspirin (ECOTRIN) 81 MG EC tablet, Take 1 tablet (81 mg total) by mouth once daily., Disp: 90 tablet, Rfl: 3    atorvastatin (LIPITOR) 10 MG tablet, Take 1 tablet (10 mg total) by mouth once daily., Disp: 90 tablet, Rfl: 3    donepezil (ARICEPT) 10 MG tablet, Take 1 tablet (10 mg total) by mouth once daily., Disp: 30 tablet, Rfl: 11    doxycycline (VIBRAMYCIN) 100 MG Cap, Take 1 capsule (100 mg total) by mouth every 12 (twelve) hours., Disp: 14 capsule, Rfl: 0    escitalopram oxalate (LEXAPRO) 10 MG tablet, Take 1 tablet (10 mg total) by mouth once daily., Disp: 90 tablet, Rfl: 3    fluticasone (FLONASE) 50 mcg/actuation nasal spray, 1 spray by Each Nare route once daily., Disp: 16 g, Rfl:  11    lisinopril (PRINIVIL,ZESTRIL) 2.5 MG tablet, Take 1 tablet (2.5 mg total) by mouth once daily., Disp: 90 tablet, Rfl: 3    memantine (NAMENDA) 10 MG Tab, Take 1 tablet (10 mg total) by mouth 2 (two) times daily., Disp: 180 tablet, Rfl: 3    metoprolol succinate (TOPROL-XL) 50 MG 24 hr tablet, Take 1 tablet (50 mg total) by mouth once daily., Disp: 30 tablet, Rfl: 11    mirabegron (MYRBETRIQ) 50 mg Tb24, Take 1 tablet (50 mg total) by mouth once daily., Disp: 30 tablet, Rfl: 11    ondansetron (ZOFRAN-ODT) 4 MG TbDL, Take 1 tablet (4 mg total) by mouth every 12 (twelve) hours as needed., Disp: 60 tablet, Rfl: 5    QUEtiapine (SEROQUEL) 25 MG Tab, Take 1 tablet (25 mg total) by mouth every evening., Disp: 30 tablet, Rfl: 5    spironolactone (ALDACTONE) 25 MG tablet, Take 1 tablet (25 mg total) by mouth once daily., Disp: 90 tablet, Rfl: 0    triamterene-hydrochlorothiazide 37.5-25 mg (DYAZIDE) 37.5-25 mg per capsule, Take 1 capsule by mouth every morning., Disp: 90 capsule, Rfl: 3    zinc oxide 10 % Crea, Apply to affected area daily to twice a day, Disp: 50 g, Rfl: 0    promethazine-dextromethorphan (PROMETHAZINE-DM) 6.25-15 mg/5 mL Syrp, Take 5 mLs by mouth 3 (three) times daily as needed (cough)., Disp: 118 mL, Rfl: 1    sumatriptan (IMITREX) 50 MG tablet, Take 1 tablet (50 mg total) by mouth once daily., Disp: 9 tablet, Rfl: 3    Past Surgical History:   Procedure Laterality Date    ANTERIOR VAGINAL REPAIR  1960's    COLONOSCOPY  2008    COLONOSCOPY N/A 1/5/2016    Procedure: COLONOSCOPY;  Surgeon: Daniel Alicea MD;  Location: [facility];  Service: Endoscopy;  Laterality: N/A;    HYSTERECTOMY      total    JOINT REPLACEMENT      bilateral hip    UPPER GASTROINTESTINAL ENDOSCOPY  2008     h. pylori       Family History   Problem Relation Age of Onset    Colon cancer Mother      in late 60s    Colon cancer Father        Social History     Social History    Marital status:       "Spouse name: N/A    Number of children: N/A    Years of education: N/A     Occupational History    Not on file.     Social History Main Topics    Smoking status: Former Smoker     Packs/day: 1.00     Years: 25.00     Types: Cigarettes     Quit date: 10/7/1998    Smokeless tobacco: Never Used    Alcohol use No      Comment: occ    Drug use: No    Sexual activity: No     Other Topics Concern    Not on file     Social History Narrative    No narrative on file             Allergies   Review of patient's allergies indicates:  No Known Allergies          Review of Systems  See HPI      Physical Exam  /60   Pulse 97   Temp 98.4 °F (36.9 °C)   Ht 5' 5" (1.651 m)   Wt 71.3 kg (157 lb 3 oz)   SpO2 97%   BMI 26.16 kg/m²     GEN: NAD, well developed, appears tired but nontoxic  HEENT: NCAT, PERRLA, EOMI, sclera clear, anicteric, TM clear bilaterally with normal light reflex, mild nasal turbinate swelling, MMM with no lesions, O/P clear - no tonsillar swelling/discharge, +moderate drainage, +minimal clear nasal discharge  NECK: normal, supple with midline trachea, no LAD, no thyromegaly, no JVD  LUNGS: CTAB, no w/r/r, no increased work of breathing  HEART: RRR, normal S1 and S2, no m/r/g, no ankle edema  ABD: s/nt/nd, NABS  SKIN: normal turgor, no rashes, no other lesions.   PSYCH: AOx3, appropriate mood and affect    "

## 2018-01-29 ENCOUNTER — TELEPHONE (OUTPATIENT)
Dept: FAMILY MEDICINE | Facility: CLINIC | Age: 83
End: 2018-01-29

## 2018-01-29 DIAGNOSIS — N30.00 ACUTE CYSTITIS WITHOUT HEMATURIA: ICD-10-CM

## 2018-01-29 DIAGNOSIS — R30.0 DYSURIA: Primary | ICD-10-CM

## 2018-01-29 NOTE — TELEPHONE ENCOUNTER
----- Message from Stephanie Lopez sent at 1/26/2018  4:31 PM CST -----  Contact: daughter- Tali James  Pt is asking for orders for a urine test. She states pt has been acting very weird and states she complained that she is burning during urination.   She is asking to come in on Monday to  cup.     She states if meds are needed the Pharmacy is [facility] on [facility]    Call back # [phone]

## 2018-01-29 NOTE — TELEPHONE ENCOUNTER
----- Message from Stephanie Lopez sent at 1/26/2018  4:31 PM CST -----  Contact: daughter- Tali James  Pt is asking for orders for a urine test. She states pt has been acting very weird and states she complained that she is burning during urination.   She is asking to come in on Monday to  cup.     She states if meds are needed the Pharmacy is [facility] on [facility]    Call back # [phone]

## 2018-01-30 ENCOUNTER — LAB VISIT (OUTPATIENT)
Dept: LAB | Facility: HOSPITAL | Age: 83
End: 2018-01-30
Attending: INTERNAL MEDICINE
Payer: MEDICARE

## 2018-01-30 DIAGNOSIS — R30.0 DYSURIA: ICD-10-CM

## 2018-01-30 LAB
BACTERIA #/AREA URNS AUTO: ABNORMAL /HPF
BILIRUB UR QL STRIP: NEGATIVE
CLARITY UR REFRACT.AUTO: ABNORMAL
COLOR UR AUTO: YELLOW
GLUCOSE UR QL STRIP: NEGATIVE
HGB UR QL STRIP: ABNORMAL
HYALINE CASTS UR QL AUTO: 3 /LPF
KETONES UR QL STRIP: NEGATIVE
LEUKOCYTE ESTERASE UR QL STRIP: ABNORMAL
MICROSCOPIC COMMENT: ABNORMAL
NITRITE UR QL STRIP: POSITIVE
PH UR STRIP: 5 [PH] (ref 5–8)
PROT UR QL STRIP: ABNORMAL
RBC #/AREA URNS AUTO: 5 /HPF (ref 0–4)
SP GR UR STRIP: 1.01 (ref 1–1.03)
SQUAMOUS #/AREA URNS AUTO: 19 /HPF
URN SPEC COLLECT METH UR: ABNORMAL
UROBILINOGEN UR STRIP-ACNC: NEGATIVE EU/DL
WBC #/AREA URNS AUTO: 81 /HPF (ref 0–5)

## 2018-01-30 PROCEDURE — 81001 URINALYSIS AUTO W/SCOPE: CPT

## 2018-01-31 RX ORDER — SULFAMETHOXAZOLE AND TRIMETHOPRIM 800; 160 MG/1; MG/1
1 TABLET ORAL 2 TIMES DAILY
Qty: 6 TABLET | Refills: 0 | Status: SHIPPED | OUTPATIENT
Start: 2018-01-31 | End: 2018-02-03

## 2018-02-02 NOTE — TELEPHONE ENCOUNTER
Pt daughter returned call; states pt is doing better, not grunting or holding her crotch; states she will schedule OV in 1-2 weeks and would like you to get catheterized specimen since pt is incontinent

## 2018-02-02 NOTE — TELEPHONE ENCOUNTER
Does not appear that the culture was ordered.  I had treated the UTI.    Please call daughter - they didn't run a culture.  Is pt doing better?  If not, would repeat a urine test for culture.

## 2018-02-16 ENCOUNTER — OFFICE VISIT (OUTPATIENT)
Dept: FAMILY MEDICINE | Facility: CLINIC | Age: 83
End: 2018-02-16
Payer: COMMERCIAL

## 2018-02-16 ENCOUNTER — DOCUMENTATION ONLY (OUTPATIENT)
Dept: FAMILY MEDICINE | Facility: CLINIC | Age: 83
End: 2018-02-16

## 2018-02-16 DIAGNOSIS — F02.80 ALZHEIMER'S DEMENTIA WITHOUT BEHAVIORAL DISTURBANCE, UNSPECIFIED TIMING OF DEMENTIA ONSET: Primary | Chronic | ICD-10-CM

## 2018-02-16 DIAGNOSIS — G30.9 ALZHEIMER'S DEMENTIA WITHOUT BEHAVIORAL DISTURBANCE, UNSPECIFIED TIMING OF DEMENTIA ONSET: Primary | Chronic | ICD-10-CM

## 2018-02-16 DIAGNOSIS — G30.9 ALZHEIMER'S DEMENTIA WITHOUT BEHAVIORAL DISTURBANCE, UNSPECIFIED TIMING OF DEMENTIA ONSET: Chronic | ICD-10-CM

## 2018-02-16 DIAGNOSIS — N18.30 CKD (CHRONIC KIDNEY DISEASE) STAGE 3, GFR 30-59 ML/MIN: Primary | Chronic | ICD-10-CM

## 2018-02-16 DIAGNOSIS — F02.80 ALZHEIMER'S DEMENTIA WITHOUT BEHAVIORAL DISTURBANCE, UNSPECIFIED TIMING OF DEMENTIA ONSET: Chronic | ICD-10-CM

## 2018-02-16 PROCEDURE — 99211 OFF/OP EST MAY X REQ PHY/QHP: CPT | Mod: PBBFAC,PO | Performed by: INTERNAL MEDICINE

## 2018-02-16 PROCEDURE — 99999 PR PBB SHADOW E&M-EST. PATIENT-LVL I: CPT | Mod: PBBFAC,,, | Performed by: INTERNAL MEDICINE

## 2018-02-16 PROCEDURE — 99214 OFFICE O/P EST MOD 30 MIN: CPT | Mod: S$PBB,,, | Performed by: INTERNAL MEDICINE

## 2018-02-16 NOTE — PROGRESS NOTES
Daughter comes in today to talk about her mother's health.  Tali.  Tali has been caring for pt but has been exhausted and unable to sustain the level of care necessary. So the pt's  took her in. He lives in [place].  After a few weeks she came to visit and the pt had not been bathed at all during that time and had developed a bedsore.  So Tali arranged for [facility] to come out but they come out 3 days a week and so the other days, the pt gets no care at all.  [facility] makes sure the pt has had breakfast, and has supper before they leave. They bathe her and they're there for 4 hours.      The patient is now no longer walking whereas before she was.  It's a new finding for the past 2 weeks.  Daughter also notes that the patient isn't eating.  She is to eat small portions but now she is not really eating much at all.  Daughter would like to initiate hospice evaluation and I think that that is reasonable.  We did talk about other possible causes including possibility that her to keep his ulcer is worse.  However she has underlying severe Alzheimer dementia and I think that would make her appropriate for hospice care.  I submitted the referral for home hospice evaluation.  If it is inappropriate and she needs other services such as palliative care, I can certainly make that referral request.    25 min spent discussing situation with daughter with all aspects related to care and coordination of the pt's hospice care referral.

## 2018-02-19 ENCOUNTER — TELEPHONE (OUTPATIENT)
Dept: FAMILY MEDICINE | Facility: CLINIC | Age: 83
End: 2018-02-19

## 2018-02-19 DIAGNOSIS — N39.0 URINARY TRACT INFECTION WITHOUT HEMATURIA, SITE UNSPECIFIED: Primary | ICD-10-CM

## 2018-02-19 NOTE — TELEPHONE ENCOUNTER
----- Message from Negra Desai sent at 2/19/2018  8:54 AM CST -----  Contact: Self  Pt calling to speak to a nurse regarding meds for bladder infection. [phone].

## 2018-02-20 ENCOUNTER — TELEPHONE (OUTPATIENT)
Dept: FAMILY MEDICINE | Facility: CLINIC | Age: 83
End: 2018-02-20

## 2018-02-20 RX ORDER — CEPHALEXIN 500 MG/1
500 CAPSULE ORAL EVERY 8 HOURS
Qty: 21 CAPSULE | Refills: 0 | Status: SHIPPED | OUTPATIENT
Start: 2018-02-20 | End: 2018-02-27

## 2018-02-20 NOTE — TELEPHONE ENCOUNTER
----- Message from Shivani Adams sent at 2/19/2018 12:12 PM CST -----  Contact: Daughter/Tali Cesar called stating she has information regarding two home health agencies. She is requesting orders for [facility] [phone] or [facility] [phone] for pt. Tali also stated pt UTI has returned would like Bactrim prescribed. Contact Her at [phone].    Thanks-

## 2018-02-20 NOTE — TELEPHONE ENCOUNTER
----- Message from Lizbeth Thomas sent at 2/20/2018  9:27 AM CST -----  Contact: [phone] Tali /daughter  Calling TO speak with doc regarding Pt UTI becoming severe ,and to confirm call that doc was supposed to make for Pt .Want to confirm if med was sent in for UTI yet.States it was supposed to be bactrim called in,because Pt need relief

## 2018-02-20 NOTE — TELEPHONE ENCOUNTER
Spoke w/patient's daughter Tali, requesting antibiotic for patient. States patient has a UTI x2/weeks and the antibiotic Bactrim once prescribed did not work. states patient is humming as if she is in pain upon urination, the urine is green and slimmy. No fever/chills stated. Patient lives in [place]  Per daughter and is unable to cone in for OV. Please advise.

## 2018-02-20 NOTE — TELEPHONE ENCOUNTER
Spoke w/patient's daughter, informed Rx sent to pharmacy. Requesting home health for patient ([facility] or [facility] in [place])

## 2018-02-20 NOTE — TELEPHONE ENCOUNTER
----- Message from Preethi Gonzalez sent at 2/20/2018 10:08 AM CST -----  Contact: DAUGHTER  DAUGHTER CALLED BACK WITH  [facility] [phone](FAX)

## 2018-02-23 ENCOUNTER — TELEPHONE (OUTPATIENT)
Dept: FAMILY MEDICINE | Facility: CLINIC | Age: 83
End: 2018-02-23

## 2018-02-23 NOTE — TELEPHONE ENCOUNTER
Spoke to [facility]by with [facility], requesting a call from PCP to give thoughts on hospice service for patient States patient's daughter reach out to them regarding hospice service.

## 2018-02-23 NOTE — TELEPHONE ENCOUNTER
Spoke with daughter - per medicare guidelines needs fac to face visit with pt to document this unfortunately. And pt unable to travel back to .  Living in [place].  Let's see what services hospice can offer - has eval on Tue.

## 2018-02-23 NOTE — TELEPHONE ENCOUNTER
Spoke with Tabby, OK for [facility] to do their evaluation.  They'll be going out on Monday.

## 2018-02-23 NOTE — TELEPHONE ENCOUNTER
----- Message from Holley Reddy sent at 2/23/2018  2:44 PM CST -----  Contact: Tabby @[facility] [phone]  Pt family members inquired about hospice and [facility] nurse wanted to consult with nurse or provider Please call  at your earliest convenience.  Thanks !

## 2018-02-28 ENCOUNTER — TELEPHONE (OUTPATIENT)
Dept: FAMILY MEDICINE | Facility: CLINIC | Age: 83
End: 2018-02-28

## 2018-02-28 NOTE — TELEPHONE ENCOUNTER
----- Message from Ap Webb sent at 2/28/2018  9:13 AM CST -----  Contact: [facility] [phone]  [facility] is calling because the family of the pt would like  to remain attending/primary physician over the pt. And also would the doctor give a verbal certification for terminal illness. Please call at your earliest convenience.

## 2018-03-18 RX ORDER — SPIRONOLACTONE 25 MG/1
TABLET ORAL
Qty: 50 TABLET | Refills: 1 | Status: SHIPPED | OUTPATIENT
Start: 2018-03-18

## 2018-03-27 DIAGNOSIS — I48.0 PAROXYSMAL ATRIAL FIBRILLATION: Chronic | ICD-10-CM

## 2018-03-27 DIAGNOSIS — I82.432 ACUTE DEEP VEIN THROMBOSIS (DVT) OF POPLITEAL VEIN OF LEFT LOWER EXTREMITY: ICD-10-CM

## 2018-03-27 DIAGNOSIS — I50.22 CHRONIC SYSTOLIC CONGESTIVE HEART FAILURE: ICD-10-CM

## 2018-03-27 RX ORDER — TRIAMTERENE AND HYDROCHLOROTHIAZIDE 37.5; 25 MG/1; MG/1
1 CAPSULE ORAL EVERY MORNING
Qty: 90 CAPSULE | Refills: 0 | Status: SHIPPED | OUTPATIENT
Start: 2018-03-27 | End: 2019-03-27

## 2018-03-27 RX ORDER — APIXABAN 2.5 MG/1
TABLET, FILM COATED ORAL
Qty: 180 TABLET | Refills: 0 | Status: SHIPPED | OUTPATIENT
Start: 2018-03-27

## 2018-03-27 RX ORDER — METOPROLOL SUCCINATE 50 MG/1
TABLET, EXTENDED RELEASE ORAL
Qty: 90 TABLET | Refills: 0 | Status: SHIPPED | OUTPATIENT
Start: 2018-03-27

## 2018-03-27 RX ORDER — ATORVASTATIN CALCIUM 10 MG/1
TABLET, FILM COATED ORAL
Qty: 90 TABLET | Refills: 0 | Status: SHIPPED | OUTPATIENT
Start: 2018-03-27

## 2018-09-19 NOTE — TELEPHONE ENCOUNTER
----- Message from Neelam Ellis sent at 3/6/2017 11:57 AM CST -----  Contact: Ms Lawton/   daughter  Ms Lawton patient has redness around wound.   Patient need appointment for tomorrow morning.  Please call Ms Lawton [phone] or [phone], [phone] for more detail info   Dr. Tej Dumont, Please find your patient's VNG attached. Please let me know if I can provide any additional information. Thanks, Augusto Barragan, CCC-A

## 2019-05-29 ENCOUNTER — PES CALL (OUTPATIENT)
Dept: ADMINISTRATIVE | Facility: CLINIC | Age: 84
End: 2019-05-29

## 2019-11-12 ENCOUNTER — PES CALL (OUTPATIENT)
Dept: ADMINISTRATIVE | Facility: CLINIC | Age: 84
End: 2019-11-12

## 2020-06-08 NOTE — PLAN OF CARE
#[phone] Vic Mccoy states pt has now tested negative to [facility] and they would like to know if pt (and /negative) can be lifted from isolation precaution?   Please call [facility] sent referral to :        Erika Alicea  ( connected)   [place]  ( connected)   Cynthia Voss ( p [phone]) ( f [phone])        [facility]

## 2021-03-27 NOTE — PROGRESS NOTES
Progress Note  Vascular Surgery    Admit Date: 2/14/2017  Post-operative Day: 8 Days Post-Op  Hospital Day: 9    SUBJECTIVE:     Follow-up For:  Procedure(s) (LRB):  THROMBECTOMY (Left)  FASCIOTOMY (Left)    No acute events overnight.    OBJECTIVE:     Vital Signs (Most Recent)  Temp: 97.7 °F (36.5 °C) (02/22/17 0400)  Pulse: 81 (02/22/17 0900)  Resp: 16 (02/22/17 0827)  BP: (!) 113/58 (02/22/17 0400)  SpO2: 95 % (02/22/17 0827)    Vital Signs Range (Last 24H):  Temp:  [96.5 °F (35.8 °C)-98.6 °F (37 °C)]   Pulse:  []   Resp:  [16-18]   BP: (105-113)/(56-60)   SpO2:  [92 %-95 %]     I & O (Last 24H):    Intake/Output Summary (Last 24 hours) at 02/22/17 1042  Last data filed at 02/22/17 0600   Gross per 24 hour   Intake          2033.75 ml   Output              700 ml   Net          1333.75 ml     Physical Exam:  General: well developed, well nourished, appears stated age, no distress  Head: normocephalic, atraumatic  Eyes:  PERRL, EOMI  Lungs:  normal respiratory effort  Heart: normal sinus  Extremities: left lower extremity wound vac to lateral incision, medial incision of fasciotomy closed; improved edema; groin incision c/d/i  Vascular: LLE: 2+DP; RLE: 2+ PT    CBC:     Recent Labs  Lab 02/22/17 0417   WBC 5.32   RBC 3.26*   HGB 9.5*   HCT 29.7*      MCV 91   MCH 29.1   MCHC 32.0     CMP:     Recent Labs  Lab 02/22/17 0417   GLU 89   CALCIUM 8.3*      K 4.7   CO2 27      BUN 19   CREATININE 1.1     Coagulation:   No results for input(s): INR, APTT in the last 168 hours.    Invalid input(s): PT    ASSESSMENT/PLAN:     Assessment: 81 yo F with dementia, HTN, paroxysmal atrial fibrillation with left common femoral,popliteal embolus s/p  left femoral embolectomy of CFA, PFA and SFA with popliteal and tibial embolectomy and LLE fasciotomy.    Plan:   Neuro: Continue pain control with po pain medication   + baseline dementia, oriented to person => home medications resumed  Pulm: Saturating  well on 2L NC; continue pulmonary toilet  Cardiovascular: CARMEN score of 6; on metoprolol; lisinopril   - Echo 2/15/17 noted EF of 20%   GI: cardiac diet as tolerated   : incontinent.   FEN: replace electrolytes as needed  Gentle hydration for RAAD- Cr back to baseline. Patient a nurse assist for PO intake.  Heme/ID: Continue eliquis 2.5 mg po bid  PT/OT to evaluate and treat  CM: DC planning, stable for discharge to SNF     Jahaira Florentino MD  PGY-3 General Surgery  Pager: [phone]    Vascular Attending  Agree with above    Rigo Bernard MD FACS     sensory intact

## 2024-01-06 NOTE — PROGRESS NOTES
Boubacar Chen  11/22/2017    HPI:  Patient is a 83 y.o. female with dementia, HTN, paroxysmal atrial fibrillation with left common femoral, popliteal embolus now s/p LLE thrombectomy and faciotomy who is here today for evaluation of fasciotomy wound.  Pt's daughters are here and report Home Health is visiting for wound care and PT ([facility]: [phone]).  The patient's wound vac just arrived and the Home Health agency is requesting wound vac orders.      No MI/stroke  Tobacco use: No    Past Medical History:   Diagnosis Date    Anxiety     Arthritis     Dementia     Former smoker     Hyperlipidemia     Hypertension     Stroke     at age 58     Past Surgical History:   Procedure Laterality Date    ANTERIOR VAGINAL REPAIR  1960's    COLONOSCOPY  2008    COLONOSCOPY N/A 1/5/2016    Procedure: COLONOSCOPY;  Surgeon: Daniel Alicea MD;  Location: [facility];  Service: Endoscopy;  Laterality: N/A;    HYSTERECTOMY      total    JOINT REPLACEMENT      bilateral hip    UPPER GASTROINTESTINAL ENDOSCOPY  2008     h. pylori     Family History   Problem Relation Age of Onset    Colon cancer Mother      in late 60s    Colon cancer Father      Social History     Social History    Marital status:      Spouse name: N/A    Number of children: N/A    Years of education: N/A     Occupational History    Not on file.     Social History Main Topics    Smoking status: Former Smoker     Packs/day: 1.00     Years: 25.00     Types: Cigarettes     Quit date: 10/7/1998    Smokeless tobacco: Never Used    Alcohol use No      Comment: occ    Drug use: No    Sexual activity: No     Other Topics Concern    Not on file     Social History Narrative    No narrative on file       Current Outpatient Prescriptions:     apixaban 2.5 mg Tab, Take 1 tablet (2.5 mg total) by mouth 2 (two) times daily., Disp: 60 tablet, Rfl: 11    ARIPiprazole (ABILIFY) 10 MG Tab, Take 1 tablet (10 mg total) by mouth once  daily., Disp: 90 tablet, Rfl: 3    aspirin (ECOTRIN) 81 MG EC tablet, Take 1 tablet (81 mg total) by mouth once daily., Disp: 90 tablet, Rfl: 3    atorvastatin (LIPITOR) 10 MG tablet, Take 1 tablet (10 mg total) by mouth once daily., Disp: 90 tablet, Rfl: 3    donepezil (ARICEPT) 10 MG tablet, Take 1 tablet (10 mg total) by mouth once daily., Disp: 30 tablet, Rfl: 11    doxycycline (VIBRAMYCIN) 100 MG Cap, Take 1 capsule (100 mg total) by mouth every 12 (twelve) hours., Disp: 14 capsule, Rfl: 0    escitalopram oxalate (LEXAPRO) 10 MG tablet, Take 1 tablet (10 mg total) by mouth once daily., Disp: 90 tablet, Rfl: 3    fluticasone (FLONASE) 50 mcg/actuation nasal spray, 1 spray by Each Nare route once daily., Disp: 16 g, Rfl: 11    lisinopril (PRINIVIL,ZESTRIL) 2.5 MG tablet, Take 1 tablet (2.5 mg total) by mouth once daily., Disp: 90 tablet, Rfl: 3    memantine (NAMENDA) 10 MG Tab, Take 1 tablet (10 mg total) by mouth 2 (two) times daily., Disp: 180 tablet, Rfl: 3    metoprolol succinate (TOPROL-XL) 50 MG 24 hr tablet, Take 1 tablet (50 mg total) by mouth once daily., Disp: 30 tablet, Rfl: 11    mirabegron (MYRBETRIQ) 50 mg Tb24, Take 1 tablet (50 mg total) by mouth once daily., Disp: 30 tablet, Rfl: 11    ondansetron (ZOFRAN-ODT) 4 MG TbDL, Take 1 tablet (4 mg total) by mouth every 12 (twelve) hours as needed., Disp: 60 tablet, Rfl: 5    QUEtiapine (SEROQUEL) 25 MG Tab, Take 1 tablet (25 mg total) by mouth every evening., Disp: 30 tablet, Rfl: 5    spironolactone (ALDACTONE) 25 MG tablet, Take 1 tablet (25 mg total) by mouth once daily., Disp: 90 tablet, Rfl: 0    sumatriptan (IMITREX) 50 MG tablet, Take 1 tablet (50 mg total) by mouth once daily., Disp: 9 tablet, Rfl: 3    triamterene-hydrochlorothiazide 37.5-25 mg (DYAZIDE) 37.5-25 mg per capsule, Take 1 capsule by mouth every morning., Disp: 90 capsule, Rfl: 3    zinc oxide 10 % Crea, Apply to affected area daily to twice a day, Disp: 50 g, Rfl:  0    REVIEW OF SYSTEMS:  General: negative; ENT: negative; Allergy and Immunology: negative; Hematological and Lymphatic: Negative; Endocrine: negative; Respiratory: no cough, shortness of breath, or wheezing; Cardiovascular: no chest pain or dyspnea on exertion; Gastrointestinal: no abdominal pain/back, change in bowel habits, or bloody stools; Genito-Urinary: dark urine noted with strong odor; no trouble voiding, or hematuria; Musculoskeletal: LLE with medial and lateral fasciotomies   Neurological: no TIA or stroke symptoms; Psychiatric: no nervousness, anxiety or depression.    PHYSICAL EXAM:       Vitals:    11/22/17 1123   BP: (!) 78/48   Pulse: 91   Temp: 98.3 °F (36.8 °C)         General appearance:  Alert, well-appearing, and in no distress.  Oriented to person, place, and time   Neurological: Normal speech, no focal findings noted; CN II - XII grossly intact           Musculoskeletal: Digits/nail without cyanosis/clubbing.  Normal muscle strength/tone.                 Neck: Supple, no significant adenopathy; thyroid is not enlarged                  Chest:  Clear to auscultation, no wheezes, rales or rhonchi, symmetric air entry     No use of accessory muscles             Cardiac: Normal rate and regular rhythm, S1 and S2 normal; PMI non-displaced          Abdomen: Soft, nontender, nondistended, no masses or organomegaly     No rebound tenderness noted; bowel sounds normal     No groin adenopathy      Extremities:   Left Leg with lateral fasciotomy wound with staples in place. 100% granulation tissue noted. No necrotic tissue.       Left anterior fasciotomy healing well with staples to medial site and 6 staples to the lateral site.      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 female with dementia, HTN, paroxysmal atrial fibrillation with left common femoral, popliteal embolus now s/p LLE thrombectomy and faciotomy - doing well  2+ R PT pulse  D/C clinic    Rigo Bernard MD FACS         MD Office

## (undated) DEVICE — COVER SNAP 36IN X 30IN

## (undated) DEVICE — SOL 9P NACL IRR PIC IL

## (undated) DEVICE — SET MICROPUNCTURE

## (undated) DEVICE — SEE MEDLINE ITEM 157173

## (undated) DEVICE — TRAY MINOR GEN SURG

## (undated) DEVICE — SET MICROPUNCT 5FRMPIS-501

## (undated) DEVICE — SYR MED RAD 150ML

## (undated) DEVICE — DRESSING TELFA PAD N ADH 2X3

## (undated) DEVICE — SYR SLIP TIP 1CC

## (undated) DEVICE — GAUZE SPONGE 4X4 12PLY

## (undated) DEVICE — DRAPE C ARM 42 X 120 10/BX

## (undated) DEVICE — SUT LIGACLIP SMALL XTRA

## (undated) DEVICE — SYR ONLY LUER LOCK 20CC

## (undated) DEVICE — SUT 7/0 24IN PROLENE BL MO

## (undated) DEVICE — SUT PROLENE 6-0 C-1 30IN BL

## (undated) DEVICE — LOOP VESSEL BLUE MAXI

## (undated) DEVICE — SUT 2-0 12-18IN SILK

## (undated) DEVICE — CATH ALL PUR URTHL RR 20FR

## (undated) DEVICE — SUT 4-0 12-18IN SILK BLACK

## (undated) DEVICE — ELECTRODE REM PLYHSV RETURN 9

## (undated) DEVICE — SPONGE DERMACEA GAUZE 4X4

## (undated) DEVICE — SUT 3-0 12-18IN SILK

## (undated) DEVICE — PAD ABD 8X10 STERILE

## (undated) DEVICE — DRESSING TRANS 4X4 TEGADERM

## (undated) DEVICE — TAPE UMBILICAL 1/8X36IN WHITE

## (undated) DEVICE — CATH EMBOLECTOMY 4F REGULAR

## (undated) DEVICE — SYR 3CC LUER LOC

## (undated) DEVICE — PROSTHESIS TRIA AIRE SIZE 10

## (undated) DEVICE — COVER INSTR ELASTIC BAND 40X20

## (undated) DEVICE — CATH EMBOLECTOMY 5F REGULAR

## (undated) DEVICE — CLOSURE SKIN STERI STRIP 1/2X4

## (undated) DEVICE — SUT PROLENE 6-0 BV-1 30IN

## (undated) DEVICE — SYS LABEL CORRECT MED

## (undated) DEVICE — STOCKINET 4INX48

## (undated) DEVICE — SUT MONOCRYL 3-0 SH U/D

## (undated) DEVICE — PACK UNIVERSAL SPLIT II

## (undated) DEVICE — HEMOSTAT SURGICEL 4X8IN

## (undated) DEVICE — SEE MEDLINE ITEM 152515

## (undated) DEVICE — BLADE SURG CARBON STEEL SZ11

## (undated) DEVICE — SET INFUSION WINGED 19GA X 3/4

## (undated) DEVICE — CLIP MED TICALL

## (undated) DEVICE — BOOT SUTURE AID

## (undated) DEVICE — CATH EMBOLECTOMY 3F REGULAR

## (undated) DEVICE — CATH PRUITT IRRIG 5FR OCCLUS

## (undated) DEVICE — SET DECANTER MEDICHOICE

## (undated) DEVICE — SUT MCRYL PLUS 4-0 PS2 27IN

## (undated) DEVICE — CATH EMBOLECTOMY 6FR

## (undated) DEVICE — DRAPE PLASTIC U 60X72

## (undated) DEVICE — SEE MEDLINE ITEM 152622

## (undated) DEVICE — SOL NS 1000CC

## (undated) DEVICE — STOCKINET TUBULAR 1 PLY 6X60IN

## (undated) DEVICE — COVERS PROBE NR-48 STERILE

## (undated) DEVICE — Device

## (undated) DEVICE — STOPCOCK 1 WAY PLASTIC

## (undated) DEVICE — DRAPE STERI 32 X 50

## (undated) DEVICE — SEE MEDLINE ITEM 153688

## (undated) DEVICE — CATH PRUITT IRRIG 9F OCCLUS

## (undated) DEVICE — CATH PRUITT IRRIG 4FROCCLUS